# Patient Record
Sex: MALE | Race: WHITE | NOT HISPANIC OR LATINO | Employment: OTHER | URBAN - METROPOLITAN AREA
[De-identification: names, ages, dates, MRNs, and addresses within clinical notes are randomized per-mention and may not be internally consistent; named-entity substitution may affect disease eponyms.]

---

## 2017-02-01 ENCOUNTER — ONCOLOGY VISIT (OUTPATIENT)
Dept: ONCOLOGY | Facility: CLINIC | Age: 52
End: 2017-02-01
Attending: INTERNAL MEDICINE
Payer: COMMERCIAL

## 2017-02-01 ENCOUNTER — HOSPITAL ENCOUNTER (OUTPATIENT)
Dept: CT IMAGING | Facility: CLINIC | Age: 52
Discharge: HOME | End: 2017-02-01
Attending: INTERNAL MEDICINE | Admitting: INTERNAL MEDICINE
Payer: COMMERCIAL

## 2017-02-01 ENCOUNTER — HOSPITAL ENCOUNTER (OUTPATIENT)
Facility: CLINIC | Age: 52
Setting detail: SPECIMEN
Discharge: HOME | End: 2017-02-01
Attending: INTERNAL MEDICINE | Admitting: INTERNAL MEDICINE
Payer: COMMERCIAL

## 2017-02-01 DIAGNOSIS — C67.9 MALIGNANT NEOPLASM OF URINARY BLADDER, UNSPECIFIED SITE (H): ICD-10-CM

## 2017-02-01 DIAGNOSIS — E53.8 VITAMIN B12 DEFICIENCY (NON ANEMIC): ICD-10-CM

## 2017-02-01 LAB
ALBUMIN SERPL-MCNC: 3.7 G/DL (ref 3.4–5)
ALP SERPL-CCNC: 82 U/L (ref 40–150)
ALT SERPL W P-5'-P-CCNC: 39 U/L (ref 0–70)
ANION GAP SERPL CALCULATED.3IONS-SCNC: 9 MMOL/L (ref 3–14)
AST SERPL W P-5'-P-CCNC: 32 U/L (ref 0–45)
BASOPHILS # BLD AUTO: 0.1 10E9/L (ref 0–0.2)
BASOPHILS NFR BLD AUTO: 0.6 %
BILIRUB SERPL-MCNC: 1.1 MG/DL (ref 0.2–1.3)
BUN SERPL-MCNC: 11 MG/DL (ref 7–30)
CALCIUM SERPL-MCNC: 9.2 MG/DL (ref 8.5–10.1)
CHLORIDE SERPL-SCNC: 95 MMOL/L (ref 94–109)
CO2 SERPL-SCNC: 26 MMOL/L (ref 20–32)
CREAT SERPL-MCNC: 0.75 MG/DL (ref 0.66–1.25)
DIFFERENTIAL METHOD BLD: ABNORMAL
EOSINOPHIL # BLD AUTO: 0.2 10E9/L (ref 0–0.7)
EOSINOPHIL NFR BLD AUTO: 1.3 %
ERYTHROCYTE [DISTWIDTH] IN BLOOD BY AUTOMATED COUNT: 11.5 % (ref 10–15)
GFR SERPL CREATININE-BSD FRML MDRD: ABNORMAL ML/MIN/1.7M2
GLUCOSE SERPL-MCNC: 119 MG/DL (ref 70–99)
HCT VFR BLD AUTO: 38.1 % (ref 40–53)
HGB BLD-MCNC: 13.4 G/DL (ref 13.3–17.7)
IMM GRANULOCYTES # BLD: 0.1 10E9/L (ref 0–0.4)
IMM GRANULOCYTES NFR BLD: 0.7 %
LYMPHOCYTES # BLD AUTO: 3 10E9/L (ref 0.8–5.3)
LYMPHOCYTES NFR BLD AUTO: 26 %
MCH RBC QN AUTO: 34.7 PG (ref 26.5–33)
MCHC RBC AUTO-ENTMCNC: 35.2 G/DL (ref 31.5–36.5)
MCV RBC AUTO: 99 FL (ref 78–100)
MONOCYTES # BLD AUTO: 1.2 10E9/L (ref 0–1.3)
MONOCYTES NFR BLD AUTO: 10.3 %
NEUTROPHILS # BLD AUTO: 7.1 10E9/L (ref 1.6–8.3)
NEUTROPHILS NFR BLD AUTO: 61.1 %
NRBC # BLD AUTO: 0 10*3/UL
NRBC BLD AUTO-RTO: 0 /100
PLATELET # BLD AUTO: 184 10E9/L (ref 150–450)
POTASSIUM SERPL-SCNC: 3.7 MMOL/L (ref 3.4–5.3)
PROT SERPL-MCNC: 8.4 G/DL (ref 6.8–8.8)
RBC # BLD AUTO: 3.86 10E12/L (ref 4.4–5.9)
SODIUM SERPL-SCNC: 130 MMOL/L (ref 133–144)
VIT B12 SERPL-MCNC: 436 PG/ML (ref 193–986)
WBC # BLD AUTO: 11.7 10E9/L (ref 4–11)

## 2017-02-01 PROCEDURE — 25000128 H RX IP 250 OP 636: Performed by: RADIOLOGY

## 2017-02-01 PROCEDURE — 71260 CT THORAX DX C+: CPT

## 2017-02-01 PROCEDURE — 36415 COLL VENOUS BLD VENIPUNCTURE: CPT

## 2017-02-01 PROCEDURE — 74177 CT ABD & PELVIS W/CONTRAST: CPT

## 2017-02-01 PROCEDURE — 25500064 ZZH RX 255 OP 636: Performed by: RADIOLOGY

## 2017-02-01 RX ORDER — IOPAMIDOL 755 MG/ML
500 INJECTION, SOLUTION INTRAVASCULAR ONCE
Status: COMPLETED | OUTPATIENT
Start: 2017-02-01 | End: 2017-02-01

## 2017-02-01 RX ADMIN — SODIUM CHLORIDE 65 ML: 9 INJECTION, SOLUTION INTRAVENOUS at 11:13

## 2017-02-01 RX ADMIN — IOPAMIDOL 100 ML: 755 INJECTION, SOLUTION INTRAVENOUS at 11:13

## 2017-02-01 NOTE — PROGRESS NOTES
Patient tolerated procedure without incident.  Patient discharged in the care of himself.  Patient aware of next appointment.

## 2017-02-03 LAB — COPATH REPORT: NORMAL

## 2017-02-06 ENCOUNTER — ONCOLOGY VISIT (OUTPATIENT)
Dept: ONCOLOGY | Facility: CLINIC | Age: 52
End: 2017-02-06
Attending: INTERNAL MEDICINE
Payer: COMMERCIAL

## 2017-02-06 VITALS
RESPIRATION RATE: 16 BRPM | OXYGEN SATURATION: 97 % | TEMPERATURE: 97.2 F | BODY MASS INDEX: 30.98 KG/M2 | HEIGHT: 71 IN | HEART RATE: 52 BPM | WEIGHT: 221.25 LBS | DIASTOLIC BLOOD PRESSURE: 79 MMHG | SYSTOLIC BLOOD PRESSURE: 149 MMHG

## 2017-02-06 DIAGNOSIS — C67.9 MALIGNANT NEOPLASM OF URINARY BLADDER, UNSPECIFIED SITE (H): Primary | ICD-10-CM

## 2017-02-06 PROCEDURE — 99213 OFFICE O/P EST LOW 20 MIN: CPT | Performed by: INTERNAL MEDICINE

## 2017-02-06 PROCEDURE — 99211 OFF/OP EST MAY X REQ PHY/QHP: CPT

## 2017-02-06 ASSESSMENT — PAIN SCALES - GENERAL: PAINLEVEL: NO PAIN (0)

## 2017-02-06 NOTE — PROGRESS NOTES
"Casey Benitez is a 52 year old male who presents for:  Chief Complaint   Patient presents with     Oncology Clinic Visit     Bladder cancer - follow up        Initial Vitals:  There were no vitals taken for this visit. Estimated body mass index is 32.22 kg/(m^2) as calculated from the following:    Height as of 8/29/16: 1.797 m (5' 10.75\").    Weight as of 8/29/16: 104.055 kg (229 lb 6.4 oz).. There is no height or weight on file to calculate BSA. BP completed using cuff size: regular  Data Unavailable No LMP for male patient. Allergies and medications reviewed.     Medications: Medication refills not needed today.  Pharmacy name entered into Hurray!:    [facility] [address] [place], [place] - [address] [facility] [place], [place] - [address]    Comments: Follow up    8 minutes for nursing intake (face to face time)   Diana Stover CMA     DISCHARGE PLAN:  Next appointments: See patient instruction section  Departure Mode: Ambulatory  Accompanied by: self  0 minutes for nursing discharge (face to face time)   Diana Stover CMA              "

## 2017-02-06 NOTE — PROGRESS NOTES
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  He completed cycle 4 on 5/28/2013. He had some treatment delays due to thrombocytopenia.  He underwent surgery on 7/30 final pathology revealed a T3N0MX tumor. His CT showed a paratrachael node (left at 1.1 cm), concern for metastatic disease, therefore a PET was obtained which did not show this node being metabolically active.    SUBJECTIVE   Patient comes in for a follow up today. He has been feeling well. No new complaints today.    HEMATOLOGY ONCOLOGY HISTORY   Per Dr. Starks's previous note:  Mr. Benitez is a 49 year old male who presents with new diagnosis of bladder cancer. In April of 2012, Casey noticed some blood in his urine which was confirmed by a UA. He was treated for urinary tract infection and failed to followup until November of this year he saw Dr. Zee.  Due to persistent painless hematuria, cystoscopy was obtained which revealed a large bladder tumor. Final pathology revealed a grade 3 PT2 muscle invasive transitional cell carcinoma of the bladder. During his initial visit, we discussed starting treatment in the neoadjuvant fashion with cisplatin and gemcitabine and the rationale behind the drugs and treatment.  Casey had his mediport placed. We obtained metastatic workup with CXR and a bone scan (rib pain) which did not reveal any evidence of metastatic disease  Casey started Cisplatin and Gemcitabine on 1/15/2013.  He completed cycle 4 on 5/28/2013. He had some treatment delays due to thrombocytopenia.  He feels like his Energy has improved. He underwent surgery on 7/30 final pathology revealed a T3N0MX tumor. He is feeling well after the surgery   His CT showed a paratrachael node (left at 1.1  "cm), concern for metastatic disease, therefore a PET was obtained which did not show this node being metabolically active     REVIEW OF SYSTEMS   As above in the HPI, o/w a complete ROS was negative.    Past medical, social histories reviewed.    Meds- Reviewed.     PHYSICAL EXAM   /79 mmHg  Pulse 52  Temp(Src) 97.2  F (36.2  C) (Tympanic)  Resp 16  Ht 1.797 m (5' 10.75\")  Wt 100.358 kg (221 lb 4 oz)  BMI 31.08 kg/m2  SpO2 97%  GEN: NAD  HEENT: PERRL, EOMI, no icterus, injection or pallor. Oropharynx is clear.  NECK: no cervical or supraclavicular lymphadenopathy  LUNGS: clear bilaterally  CV: regular, no murmurs.  ABDOMEN: soft, non-tender, non-distended, normal bowel sounds, no hepatosplenomegaly.  EXT: warm, well perfused, no edema  NEURO: alert  SKIN: no rashes    DATA:  Recent Labs   Lab Test  02/01/17   1056  07/18/16   1317   05/06/13   0840   04/01/13   0935   NA  130*  130*   < >  137   < >  138   POTASSIUM  3.7  3.7   < >  3.8   < >  4.1   CHLORIDE  95  98   < >  96   < >  100   CO2  26  24   < >  27   < >  30   ANIONGAP  9  8   < >  13.6   < >  9   BUN  11  7   < >  8   < >  9   CR  0.75  0.78   < >  0.82   < >  0.83   GLC  119*  233*   < >  173*   < >  96   KIRBY  9.2  8.7   < >  9.1   < >  9.2   MAG   --    --    --   1.7   --   2.0    < > = values in this interval not displayed.     Recent Labs   Lab Test  02/01/17   1056 07/18/16   1317  02/09/16   1412   WBC  11.7*  8.5  10.8   HGB  13.4  14.1  14.6   PLT  184  174  190   MCV  99  99  98   NEUTROPHIL  61.1  53.3  50.6     Recent Labs   Lab Test  02/01/17   1056  07/18/16   1317  02/09/16   1412   BILITOTAL  1.1  0.7  0.4   ALKPHOS  82  73  79   ALT  39  62  59   AST  32  51*  32   ALBUMIN  3.7  3.8  4.2   2/1/17  Urine-catheterized, urostomy:   Negative for High-Grade Urothelial   Carcinoma       Results for orders placed or performed during the hospital encounter of 02/01/17   CT Chest/Abdomen/Pelvis w Contrast    Narrative    CT " CHEST/ABDOMEN/PELVIS WITH CONTRAST  2/1/2017 11:21 AM    HISTORY:  Follow up bladder cancer. Deficiency of other specified B  group vitamins.    TECHNIQUE: CT scan obtained of the chest, abdomen, and pelvis with  oral and IV contrast. 100 mL Isovue-370 injected. Radiation dose for  this scan was reduced using automated exposure control, adjustment of  the mA and/or kV according to patient size, or iterative  reconstruction technique.    COMPARISON:  CT chest, abdomen and pelvis 1/12/2016.    FINDINGS:  Chest: No enlarged lymph nodes identified in the chest. Stable tiny  right anterior mid chest pulmonary nodule that is 0.3 cm, series 3  image 31. A new left upper lobe midchest nodule likely represents  mucus impaction measuring 0.2 cm on image 31. No destructive-appearing  bony lesions identified. Some sclerosis along the inferior half of the  T11 vertebral body on coronal series 4 image 117 is stable and could  be degenerative in nature.    Abdomen/pelvis: No aggressive-appearing bony lesions.  Cystoprostatectomy and right lower quadrant ileal conduit again  identified. No acute bowel finding. Stable mild to moderate bilateral  hydronephrosis. Bilateral renal parenchyma enhances symmetrically.  Stable tiny hypodensity medial left kidney that is 0.4 cm, series 2  image 70. Liver demonstrates some mild nodularity without worrisome  focal lesion. Contracted gallbladder. Splenic calcifications. Pancreas  appears unremarkable. Vascular calcifications. No adenopathy  identified. A few minimally prominent upper abdominal lymph nodes are  again noted and appear stable.      Impression    IMPRESSION:  1. No evidence for convincing new disease.  2. Stable cystectomy and ileal diversion. Stable bilateral mild  hydronephrosis.  3. A new but small pulmonary nodule at the left upper lobe most likely  represents mucus impaction. Consider surveillance imaging.    CRYSTAL CHANDRA MD     ECOG PS: 1     ASSESSMENT   Jaime Benitez is a  pleasant 49-year-old male with diagnosis of transitional cell carcinoma of the bladder at least T2 NXMX s/p complete cystectomy with ileal diversion.  CT scan results 1/12/16 showed no evidence of recurrence.  Switched to yearly CT scan 1/2016.  He [facility] to [facility]   - Labs were reviewed with the patient- unremarkable, B12 level.  - He is on B12 supplementation.   - CT scan 2/1/2017 is without any evidence of disease    PLAN   RTC MD six months  Labs before next visit- CBC diff, CMP, urine cytology and B12    Constanza Mccarty MD  2/6/2017    CC Von Ceballos MD

## 2017-02-06 NOTE — MR AVS SNAPSHOT
After Visit Summary   2/6/2017    Casey Benitez    MRN: [id number]           Patient Information     Date Of Birth          1965        Visit Information        Provider Department      2/6/2017 11:30 AM Constanza Mccarty MD [facility]        Today's Diagnoses     Malignant neoplasm of urinary bladder, unspecified site (H)    -  1       Care Instructions        RTC MD six months    Labs before next visit- CBC diff, CMP, urine cytology and B12        Follow-ups after your visit        Your next 10 appointments already scheduled     Jul 31, 2017 10:00 AM   Return Visit with Constanza Mccarty MD   [facility] ([facility])    [facility]  [address]  [facility] [address] [phone]            Aug 07, 2017  1:30 PM   Return Visit with Constanza Mccarty MD   [facility] ([facility])    [facility]  [address]  [facility] [address] [phone]              Future tests that were ordered for you today     Open Future Orders        Priority Expected Expires Ordered    CBC with platelets differential Routine 8/6/2017 2/6/2018 2/6/2017    Comprehensive metabolic panel Routine 8/6/2017 2/6/2018 2/6/2017    Vitamin B12 Routine 8/6/2017 2/6/2018 2/6/2017    Cytology non gyn Routine 8/6/2017 2/6/2018 2/6/2017            Who to contact     If you have questions or need follow up information about today's clinic visit or your schedule please contact [facility] directly at [phone].  Normal or non-critical lab and imaging results will be communicated to you by MyChart, letter or phone within 4 business days after the clinic has received the results. If you do not hear from us within 7 days, please contact the clinic through MyChart or phone. If you have a critical or abnormal lab result, we will notify you by phone as  "soon as possible.  Submit refill requests through [employer] or call your pharmacy and they will forward the refill request to us. Please allow 3 business days for your refill to be completed.          Additional Information About Your Visit        [email or web address] Information     [employer] gives you secure access to your electronic health record. If you see a primary care provider, you can also send messages to your care team and make appointments. If you have questions, please call your primary care clinic.  If you do not have a primary care provider, please call [phone] and they will assist you.        Care EveryWhere ID     This is your Care EveryWhere ID. This could be used by other organizations to access your [place] medical records  [id number]        Your Vitals Were     Pulse Temperature Respirations Height BMI (Body Mass Index) Pulse Oximetry    52 97.2  F (36.2  C) (Tympanic) 16 1.797 m (5' 10.75\") 31.08 kg/m2 97%       Blood Pressure from Last 3 Encounters:   02/06/17 149/79   08/29/16 164/81   02/09/16 131/83    Weight from Last 3 Encounters:   02/06/17 100.358 kg (221 lb 4 oz)   08/29/16 104.055 kg (229 lb 6.4 oz)   02/09/16 97.387 kg (214 lb 11.2 oz)               Primary Care Provider Office Phone # Fax #    Ramona Ann Aaseby-Aguilera, PA-C [phone]       [facility] [address] [address] [facility] [address]        Thank you!     Thank you for choosing [facility]  for your care. Our goal is always to provide you with excellent care. Hearing back from our patients is one way we can continue to improve our services. Please take a few minutes to complete the written survey that you may receive in the mail after your visit with us. Thank you!             Your Updated Medication List - Protect others around you: Learn how to safely use, store and throw away your medicines at www.disposemymeds.org.          This list is accurate as of: 2/6/17 12:00 PM.  Always " use your most recent med list.                   Brand Name Dispense Instructions for use    atenolol 100 MG tablet    TENORMIN    90 tablet    Take 1 tablet (100 mg) by mouth daily       budesonide-formoterol 160-4.5 MCG/ACT Inhaler    SYMBICORT    3 Inhaler    Inhale 2 puffs into the lungs 2 times daily as needed       cyabnocobalamin 2500 MCG sublingual tablet    VITAMIN B-12    30 tablet    Place 2,500 mcg under the tongue daily       hydrochlorothiazide 12.5 MG Tabs tablet     90 tablet    Take 1 tablet (12.5 mg) by mouth daily       lisinopril 20 MG tablet    PRINIVIL/ZESTRIL    135 tablet    Take 1.5 tablets (30 mg) by mouth daily       LORazepam 1 MG tablet    ATIVAN    30 tablet    Take 0.5 tablets (0.5 mg) by mouth nightly as needed for anxiety

## 2017-02-10 DIAGNOSIS — I10 HYPERTENSION GOAL BP (BLOOD PRESSURE) < 140/90: Primary | ICD-10-CM

## 2017-02-10 RX ORDER — LISINOPRIL 20 MG/1
30 TABLET ORAL DAILY
Qty: 45 TABLET | Refills: 0 | Status: SHIPPED | OUTPATIENT
Start: 2017-02-10 | End: 2017-03-20

## 2017-02-10 RX ORDER — HYDROCHLOROTHIAZIDE 12.5 MG/1
12.5 TABLET ORAL DAILY
Qty: 30 TABLET | Refills: 0 | Status: SHIPPED | OUTPATIENT
Start: 2017-02-10 | End: 2017-03-23

## 2017-02-10 NOTE — TELEPHONE ENCOUNTER
Prescription approved per [facility] Refill Protocol.  For 30 day RX-  LM pt needs yearly OV with PCP     Maria Esther Casillas RN

## 2017-02-10 NOTE — TELEPHONE ENCOUNTER
Pending Prescriptions:                       Disp   Refills    lisinopril (PRINIVIL/ZESTRIL) 20 MG [id number] ta*2            Sig: Take 1.5 tablets (30 mg) by mouth daily    hydrochlorothiazide 12.5 MG TABS tablet   90 tab*2            Sig: Take 1 tablet (12.5 mg) by mouth daily          Last Written Prescription Date: 05/23/2016  Last Fill Quantity: 135/90, # refills: 2/2  Last Office Visit with G, [facility] or [facility] prescribing provider: 02/09/2016       POTASSIUM   Date Value Ref Range Status   02/01/2017 3.7 3.4 - 5.3 mmol/L Final     CREATININE   Date Value Ref Range Status   02/01/2017 0.75 0.66 - 1.25 mg/dL Final     BP Readings from Last 3 Encounters:   02/06/17 149/79   08/29/16 164/81   02/09/16 131/83     Ludwin OSEGUERAT

## 2017-03-20 DIAGNOSIS — I10 HYPERTENSION GOAL BP (BLOOD PRESSURE) < 140/90: ICD-10-CM

## 2017-03-20 NOTE — TELEPHONE ENCOUNTER
Pending Prescriptions:                       Disp   Refills    lisinopril (PRINIVIL/ZESTRIL) 20 MG [id number] tab*0            Sig: Take 1.5 tablets (30 mg) by mouth daily          Last Written Prescription Date: 02/10/2017  Last Fill Quantity: 45, # refills: 0  Last Office Visit with [facility], [facility] or [facility] prescribing provider: 02/09/2016       Potassium   Date Value Ref Range Status   02/01/2017 3.7 3.4 - 5.3 mmol/L Final     Creatinine   Date Value Ref Range Status   02/01/2017 0.75 0.66 - 1.25 mg/dL Final     BP Readings from Last 3 Encounters:   02/06/17 149/79   08/29/16 164/81   02/09/16 131/83     Ludwin OSEGUERAT

## 2017-03-21 RX ORDER — LISINOPRIL 20 MG/1
30 TABLET ORAL DAILY
Qty: 20 TABLET | Refills: 0 | Status: SHIPPED | OUTPATIENT
Start: 2017-03-21 | End: 2017-03-31

## 2017-03-21 NOTE — TELEPHONE ENCOUNTER
Routing refill request to provider for review/approval because:  Katt given x1 and patient did not follow up, please advise  Patient needs to be seen because it has been more than 1 year since last office visit.  Ricardo Lopez RN, BSN

## 2017-03-23 ENCOUNTER — MYC MEDICAL ADVICE (OUTPATIENT)
Dept: FAMILY MEDICINE | Facility: CLINIC | Age: 52
End: 2017-03-23

## 2017-03-23 DIAGNOSIS — I10 HYPERTENSION GOAL BP (BLOOD PRESSURE) < 140/90: ICD-10-CM

## 2017-03-23 RX ORDER — HYDROCHLOROTHIAZIDE 12.5 MG/1
12.5 TABLET ORAL DAILY
Qty: 30 TABLET | Refills: 0 | Status: SHIPPED | OUTPATIENT
Start: 2017-03-23 | End: 2017-03-31

## 2017-03-23 NOTE — TELEPHONE ENCOUNTER
Prescription approved per [facility] Refill Protocol.  Maria Esther Casillas RN

## 2017-03-23 NOTE — TELEPHONE ENCOUNTER
Pending Prescriptions:                       Disp   Refills    hydrochlorothiazide 12.5 MG TABS tablet   30 tab*0            Sig: Take 1 tablet (12.5 mg) by mouth daily    Hudrochlorothiazide  Last Written Prescription Date: 02/10/17  Last Fill Quantity: 30, # refills: 0  Last Office Visit with [facility], [facility] or [facility] prescribing provider: 02/09/2016  Next 5 appointments (look out 90 days)     Mar 31, 2017 11:30 AM CDT   Char Brunson with Ramona Ann Aaseby-Aguilera, PA-C   [facility]    [address] [facility] [address] [phone]                   Potassium   Date Value Ref Range Status   02/01/2017 3.7 3.4 - 5.3 mmol/L Final     Creatinine   Date Value Ref Range Status   02/01/2017 0.75 0.66 - 1.25 mg/dL Final     BP Readings from Last 3 Encounters:   02/06/17 149/79   08/29/16 164/81   02/09/16 131/83     Livier Gutierrez

## 2017-03-31 ENCOUNTER — OFFICE VISIT (OUTPATIENT)
Dept: FAMILY MEDICINE | Facility: CLINIC | Age: 52
End: 2017-03-31
Payer: COMMERCIAL

## 2017-03-31 VITALS
TEMPERATURE: 97.7 F | HEIGHT: 71 IN | BODY MASS INDEX: 30.21 KG/M2 | SYSTOLIC BLOOD PRESSURE: 122 MMHG | WEIGHT: 215.8 LBS | DIASTOLIC BLOOD PRESSURE: 72 MMHG | HEART RATE: 58 BPM | OXYGEN SATURATION: 97 %

## 2017-03-31 DIAGNOSIS — I10 HYPERTENSION GOAL BP (BLOOD PRESSURE) < 140/90: ICD-10-CM

## 2017-03-31 DIAGNOSIS — Z13.0 SCREENING FOR DISORDER OF BLOOD AND BLOOD-FORMING ORGANS: ICD-10-CM

## 2017-03-31 DIAGNOSIS — Z13.1 SCREENING FOR DIABETES MELLITUS: ICD-10-CM

## 2017-03-31 DIAGNOSIS — F41.9 ANXIETY: ICD-10-CM

## 2017-03-31 DIAGNOSIS — Z13.29 SCREENING FOR THYROID DISORDER: ICD-10-CM

## 2017-03-31 DIAGNOSIS — I10 ESSENTIAL HYPERTENSION WITH GOAL BLOOD PRESSURE LESS THAN 140/90: ICD-10-CM

## 2017-03-31 DIAGNOSIS — Z13.220 LIPID SCREENING: ICD-10-CM

## 2017-03-31 DIAGNOSIS — G25.81 RESTLESS LEGS SYNDROME (RLS): Primary | ICD-10-CM

## 2017-03-31 DIAGNOSIS — J41.0 SIMPLE CHRONIC BRONCHITIS (H): ICD-10-CM

## 2017-03-31 PROCEDURE — 99396 PREV VISIT EST AGE 40-64: CPT | Performed by: PHYSICIAN ASSISTANT

## 2017-03-31 RX ORDER — LISINOPRIL 20 MG/1
30 TABLET ORAL DAILY
Qty: 135 TABLET | Refills: 3 | Status: SHIPPED | OUTPATIENT
Start: 2017-03-31 | End: 2018-04-17

## 2017-03-31 RX ORDER — ATENOLOL 100 MG/1
100 TABLET ORAL DAILY
Qty: 90 TABLET | Refills: 1 | Status: SHIPPED | OUTPATIENT
Start: 2017-03-31 | End: 2017-11-22

## 2017-03-31 RX ORDER — HYDROCHLOROTHIAZIDE 12.5 MG/1
12.5 TABLET ORAL DAILY
Qty: 90 TABLET | Refills: 3 | Status: SHIPPED | OUTPATIENT
Start: 2017-03-31 | End: 2018-04-17

## 2017-03-31 RX ORDER — BUDESONIDE AND FORMOTEROL FUMARATE DIHYDRATE 160; 4.5 UG/1; UG/1
2 AEROSOL RESPIRATORY (INHALATION) 2 TIMES DAILY PRN
Qty: 3 INHALER | Refills: 2 | Status: SHIPPED | OUTPATIENT
Start: 2017-03-31 | End: 2019-06-13

## 2017-03-31 RX ORDER — LORAZEPAM 1 MG/1
0.5 TABLET ORAL
Qty: 30 TABLET | Refills: 0 | Status: SHIPPED | OUTPATIENT
Start: 2017-03-31 | End: 2020-10-14

## 2017-03-31 NOTE — MR AVS SNAPSHOT
After Visit Summary   3/31/2017    Casey Benitez    MRN: [id number]           Patient Information     Date Of Birth          1965        Visit Information        Provider Department      3/31/2017 11:30 AM Aaseby-Aguilera, Ramona Ann, PA-C [facility]        Today's Diagnoses     Restless legs syndrome (RLS)    -  1    Hypertension goal BP (blood pressure) < 140/90        Anxiety        Screening for diabetes mellitus        Screening for thyroid disorder        Lipid screening        Screening for disorder of blood and blood-forming organs        Essential hypertension with goal blood pressure less than 140/90        Simple chronic bronchitis (H)          Care Instructions      Preventive Health Recommendations  Male Ages 50 - 64    Yearly exam:             See your health care provider every year in order to  o   Review health changes.   o   Discuss preventive care.    o   Review your medicines if your doctor has prescribed any.     Have a cholesterol test every 5 years, or more frequently if you are at risk for high cholesterol/heart disease.     Have a diabetes test (fasting glucose) every three years. If you are at risk for diabetes, you should have this test more often.     Have a colonoscopy at age 50, or have a yearly FIT test (stool test). These exams will check for colon cancer.      Talk with your health care provider about whether or not a prostate cancer screening test (PSA) is right for you.    You should be tested each year for STDs (sexually transmitted diseases), if you re at risk.     Shots: Get a flu shot each year. Get a tetanus shot every 10 years.     Nutrition:    Eat at least 5 servings of fruits and vegetables daily.     Eat whole-grain bread, whole-wheat pasta and brown rice instead of white grains and rice.     Talk to your provider about Calcium and Vitamin D.     Lifestyle    Exercise for at least 150 minutes a week (30 minutes a day, 5 days a week). This  will help you control your weight and prevent disease.     Limit alcohol to one drink per day.     No smoking.     Wear sunscreen to prevent skin cancer.     See your dentist every six months for an exam and cleaning.     See your eye doctor every 1 to 2 years.          Follow-ups after your visit        Your next 10 appointments already scheduled     Jul 31, 2017 10:00 AM CDT   Return Visit with RH ONCOLOGY NURSE   [facility] ([facility])    [facility]  [address]  [facility] [address]   [phone]            Aug 07, 2017  1:30 PM CDT   Return Visit with Constanza Mccarty MD   [facility] ([facility])    [facility]  [address]  [facility] [address]   [phone]              Future tests that were ordered for you today     Open Future Orders        Priority Expected Expires Ordered    CBC with platelets Routine  8/31/2017 3/31/2017    TSH with free T4 reflex Routine  8/31/2017 3/31/2017    Comprehensive metabolic panel Routine  8/31/2017 3/31/2017    Lipid panel reflex to direct LDL Routine  8/31/2017 3/31/2017    Ferritin Routine  8/31/2017 3/31/2017            Who to contact     If you have questions or need follow up information about today's clinic visit or your schedule please contact [facility] directly at [phone].  Normal or non-critical lab and imaging results will be communicated to you by MyChart, letter or phone within 4 business days after the clinic has received the results. If you do not hear from us within 7 days, please contact the clinic through MyChart or phone. If you have a critical or abnormal lab result, we will notify you by phone as soon as possible.  Submit refill requests through [employer] or call your pharmacy and they will forward the refill request to us. Please allow 3 business days for your refill to be completed.  "         Additional Information About Your Visit        MyChart Information     [employer] gives you secure access to your electronic health record. If you see a primary care provider, you can also send messages to your care team and make appointments. If you have questions, please call your primary care clinic.  If you do not have a primary care provider, please call [phone] and they will assist you.        Care EveryWhere ID     This is your Care EveryWhere ID. This could be used by other organizations to access your [place] medical records  [id number]        Your Vitals Were     Pulse Temperature Height Pulse Oximetry BMI (Body Mass Index)       58 97.7  F (36.5  C) (Oral) 5' 10.75\" (1.797 m) 97% 30.31 kg/m2        Blood Pressure from Last 3 Encounters:   03/31/17 122/72   02/06/17 149/79   08/29/16 164/81    Weight from Last 3 Encounters:   03/31/17 215 lb 12.8 oz (97.9 kg)   02/06/17 221 lb 4 oz (100.4 kg)   08/29/16 229 lb 6.4 oz (104.1 kg)                 Where to get your medicines      These medications were sent to [place] [address] [place] [facility] [address] [facility]  [address] [facility] [address]    Hours:  Tech issues with their phone system Phone:  [phone]     atenolol 100 MG tablet    budesonide-formoterol 160-4.5 MCG/ACT Inhaler    hydrochlorothiazide 12.5 MG Tabs tablet    lisinopril 20 MG tablet         Some of these will need a paper prescription and others can be bought over the counter.  Ask your nurse if you have questions.     Bring a paper prescription for each of these medications     LORazepam 1 MG tablet          Primary Care Provider Office Phone # Fax #    Ramona Ann Aaseby-Aguilera, PA-C [phone]       [facility] [address]  [place] [address]        Thank you!     Thank you for choosing [facility]  for your care. Our goal is always to provide you with excellent care. Hearing back from our patients is one " way we can continue to improve our services. Please take a few minutes to complete the written survey that you may receive in the mail after your visit with us. Thank you!             Your Updated Medication List - Protect others around you: Learn how to safely use, store and throw away your medicines at www.disposemymeds.org.          This list is accurate as of: 3/31/17 12:16 PM.  Always use your most recent med list.                   Brand Name Dispense Instructions for use    atenolol 100 MG tablet    TENORMIN    90 tablet    Take 1 tablet (100 mg) by mouth daily       budesonide-formoterol 160-4.5 MCG/ACT Inhaler    SYMBICORT    3 Inhaler    Inhale 2 puffs into the lungs 2 times daily as needed       cyabnocobalamin 2500 MCG sublingual tablet    VITAMIN B-12    30 tablet    Place 2,500 mcg under the tongue daily       hydrochlorothiazide 12.5 MG Tabs tablet     90 tablet    Take 1 tablet (12.5 mg) by mouth daily       lisinopril 20 MG tablet    PRINIVIL/ZESTRIL    135 tablet    Take 1.5 tablets (30 mg) by mouth daily       LORazepam 1 MG tablet    ATIVAN    30 tablet    Take 0.5 tablets (0.5 mg) by mouth nightly as needed for anxiety

## 2017-03-31 NOTE — NURSING NOTE
"Chief Complaint   Patient presents with     Physical       Initial /72 (BP Location: Right arm, Patient Position: Chair, Cuff Size: Adult Large)  Pulse 58  Temp 97.7  F (36.5  C) (Oral)  Ht 5' 10.75\" (1.797 m)  Wt 215 lb 12.8 oz (97.9 kg)  SpO2 97%  BMI 30.31 kg/m2 Estimated body mass index is 30.31 kg/(m^2) as calculated from the following:    Height as of this encounter: 5' 10.75\" (1.797 m).    Weight as of this encounter: 215 lb 12.8 oz (97.9 kg).  Medication Reconciliation: complete Quyen Norman CMA      "

## 2017-03-31 NOTE — PROGRESS NOTES
SUBJECTIVE:     CC: Casey Benitez is an 52 year old male who presents for preventative health visit.     Healthy Habits:    Do you get at least three servings of calcium containing foods daily (dairy, green leafy vegetables, etc.)? yes    Amount of exercise or daily activities, outside of work: 2 day(s) per week    Problems taking medications regularly No    Medication side effects: No    Have you had an eye exam in the past two years? yes    Do you see a dentist twice per year? yes    Do you have sleep apnea, excessive snoring or daytime drowsiness?no            Today's PHQ-2 Score:   PHQ-2 ( 1999 Pf[employer]) 2/6/2017 8/29/2016   Q1: Little interest or pleasure in doing things 0 0   Q2: Feeling down, depressed or hopeless 0 0   PHQ-2 Score 0 0       Abuse: Current or Past(Physical, Sexual or Emotional)- No  Do you feel safe in your environment - Yes    Social History   Substance Use Topics     Smoking status: Former Smoker     Packs/day: 0.50     Years: 20.00     Types: Cigarettes     Start date: 1/30/1992     Quit date: 12/14/2012     Smokeless tobacco: Never Used     Alcohol use Yes      Comment: social     social    Last PSA:   PSA   Date Value Ref Range Status   07/27/2009 0.24 0 - 4 ug/L Final       Recent Labs   Lab Test  05/13/15   0959  11/12/12   1300   CHOL  173  234*   HDL  50  41   LDL  102  159*   TRIG  104  172*   CHOLHDLRATIO  3.5  5.8*       Reviewed orders with patient. Reviewed health maintenance and updated orders accordingly - Yes    Reviewed and updated as needed this visit by clinical staff  Tobacco  Allergies  Meds  Med Hx  Surg Hx  Fam Hx  Soc Hx        Reviewed and updated as needed this visit by Provider            ROS:  C: NEGATIVE for fever, chills, change in weight  I: NEGATIVE for worrisome rashes, moles or lesions  E: NEGATIVE for vision changes or irritation  ENT: NEGATIVE for ear, mouth and throat problems  R: NEGATIVE for significant cough or SOB  CV: NEGATIVE for chest pain,  "palpitations or peripheral edema  GI: NEGATIVE for nausea, abdominal pain, heartburn, or change in bowel habits   male: negative for dysuria, hematuria, decreased urinary stream, erectile dysfunction, urethral discharge  M: NEGATIVE for significant arthralgias or myalgia  N: NEGATIVE for weakness, dizziness or paresthesias  P: NEGATIVE for changes in mood or affect    BP Readings from Last 3 Encounters:   03/31/17 122/72   02/06/17 149/79   08/29/16 164/81    Wt Readings from Last 3 Encounters:   03/31/17 215 lb 12.8 oz (97.9 kg)   02/06/17 221 lb 4 oz (100.4 kg)   08/29/16 229 lb 6.4 oz (104.1 kg)                  OBJECTIVE:     /72 (BP Location: Right arm, Patient Position: Chair, Cuff Size: Adult Large)  Pulse 58  Temp 97.7  F (36.5  C) (Oral)  Ht 5' 10.75\" (1.797 m)  Wt 215 lb 12.8 oz (97.9 kg)  SpO2 97%  BMI 30.31 kg/m2  EXAM:  GENERAL: healthy, alert and no distress  EYES: Eyes grossly normal to inspection, PERRL and conjunctivae and sclerae normal  HENT: ear canals and TM's normal, nose and mouth without ulcers or lesions  NECK: no adenopathy, no asymmetry, masses, or scars and thyroid normal to palpation  RESP: lungs clear to auscultation - no rales, rhonchi or wheezes  CV: regular rate and rhythm, normal S1 S2, no S3 or S4, no murmur, click or rub, no peripheral edema and peripheral pulses strong  ABDOMEN: soft, nontender, no hepatosplenomegaly, no masses and bowel sounds normal  MS: no gross musculoskeletal defects noted, no edema  SKIN: no suspicious lesions or rashes  NEURO: Normal strength and tone, mentation intact and speech normal  PSYCH: mentation appears normal, affect normal/bright    ASSESSMENT/PLAN:     1. Hypertension goal BP (blood pressure) < 140/90    - hydrochlorothiazide 12.5 MG TABS tablet; Take 1 tablet (12.5 mg) by mouth daily  Dispense: 90 tablet; Refill: 3  - lisinopril (PRINIVIL/ZESTRIL) 20 MG tablet; Take 1.5 tablets (30 mg) by mouth daily  Dispense: 135 tablet; " "Refill: 3    2. Anxiety    - LORazepam (ATIVAN) 1 MG tablet; Take 0.5 tablets (0.5 mg) by mouth nightly as needed for anxiety  Dispense: 30 tablet; Refill: 0    3. Screening for diabetes mellitus    - Comprehensive metabolic panel; Future    4. Screening for thyroid disorder    - TSH with free T4 reflex; Future    5. Lipid screening    - Lipid panel reflex to direct LDL; Future    6. Screening for disorder of blood and blood-forming organs    - CBC with platelets; Future    7. Essential hypertension with goal blood pressure less than 140/90    - atenolol (TENORMIN) 100 MG tablet; Take 1 tablet (100 mg) by mouth daily  Dispense: 90 tablet; Refill: 1    8. Simple chronic bronchitis (H)    - budesonide-formoterol (SYMBICORT) 160-4.5 MCG/ACT Inhaler; Inhale 2 puffs into the lungs 2 times daily as needed  Dispense: 3 Inhaler; Refill: 2    9. Restless legs syndrome (RLS)    - Ferritin; Future    COUNSELING:  Reviewed preventive health counseling, as reflected in patient instructions       Consider AAA screening for ages 65-75 and smoking history       Regular exercise       Healthy diet/nutrition       Vision screening       Hearing screening       Aspirin Prophylaxsis         reports that he quit smoking about 4 years ago. His smoking use included Cigarettes. He started smoking about 25 years ago. He has a 10.00 pack-year smoking history. He has never used smokeless tobacco.    Estimated body mass index is 30.31 kg/(m^2) as calculated from the following:    Height as of this encounter: 5' 10.75\" (1.797 m).    Weight as of this encounter: 215 lb 12.8 oz (97.9 kg).       Counseling Resources:  ATP IV Guidelines  Pooled Cohorts Equation Calculator  FRAX Risk Assessment  ICSI Preventive Guidelines  Dietary Guidelines for Americans, 2010  AquarisPLUS Int's MyPlate  ASA Prophylaxis  Lung CA Screening    Ramona Ann Aaseby-Aguilera, PA-C  [facility]    Patient Instructions     Preventive Health Recommendations  Male Ages 50 " - 64    Yearly exam:             See your health care provider every year in order to  o   Review health changes.   o   Discuss preventive care.    o   Review your medicines if your doctor has prescribed any.     Have a cholesterol test every 5 years, or more frequently if you are at risk for high cholesterol/heart disease.     Have a diabetes test (fasting glucose) every three years. If you are at risk for diabetes, you should have this test more often.     Have a colonoscopy at age 50, or have a yearly FIT test (stool test). These exams will check for colon cancer.      Talk with your health care provider about whether or not a prostate cancer screening test (PSA) is right for you.    You should be tested each year for STDs (sexually transmitted diseases), if you re at risk.     Shots: Get a flu shot each year. Get a tetanus shot every 10 years.     Nutrition:    Eat at least 5 servings of fruits and vegetables daily.     Eat whole-grain bread, whole-wheat pasta and brown rice instead of white grains and rice.     Talk to your provider about Calcium and Vitamin D.     Lifestyle    Exercise for at least 150 minutes a week (30 minutes a day, 5 days a week). This will help you control your weight and prevent disease.     Limit alcohol to one drink per day.     No smoking.     Wear sunscreen to prevent skin cancer.     See your dentist every six months for an exam and cleaning.     See your eye doctor every 1 to 2 years.

## 2017-05-26 ENCOUNTER — OFFICE VISIT (OUTPATIENT)
Dept: URGENT CARE | Facility: URGENT CARE | Age: 52
End: 2017-05-26
Payer: COMMERCIAL

## 2017-05-26 VITALS
TEMPERATURE: 98.3 F | DIASTOLIC BLOOD PRESSURE: 78 MMHG | WEIGHT: 215 LBS | SYSTOLIC BLOOD PRESSURE: 132 MMHG | BODY MASS INDEX: 30.2 KG/M2 | HEART RATE: 67 BPM | RESPIRATION RATE: 20 BRPM | OXYGEN SATURATION: 98 %

## 2017-05-26 DIAGNOSIS — R30.0 DYSURIA: Primary | ICD-10-CM

## 2017-05-26 LAB
ALBUMIN UR-MCNC: NEGATIVE MG/DL
APPEARANCE UR: CLEAR
BACTERIA #/AREA URNS HPF: ABNORMAL /HPF
BILIRUB UR QL STRIP: NEGATIVE
COLOR UR AUTO: YELLOW
GLUCOSE UR STRIP-MCNC: NEGATIVE MG/DL
HGB UR QL STRIP: ABNORMAL
KETONES UR STRIP-MCNC: NEGATIVE MG/DL
LEUKOCYTE ESTERASE UR QL STRIP: ABNORMAL
NITRATE UR QL: POSITIVE
PH UR STRIP: 6.5 PH (ref 5–7)
RBC #/AREA URNS AUTO: ABNORMAL /HPF (ref 0–2)
SP GR UR STRIP: 1.01 (ref 1–1.03)
URN SPEC COLLECT METH UR: ABNORMAL
UROBILINOGEN UR STRIP-ACNC: 0.2 EU/DL (ref 0.2–1)
WBC #/AREA URNS AUTO: ABNORMAL /HPF (ref 0–2)

## 2017-05-26 PROCEDURE — 81001 URINALYSIS AUTO W/SCOPE: CPT | Performed by: FAMILY MEDICINE

## 2017-05-26 PROCEDURE — 96372 THER/PROPH/DIAG INJ SC/IM: CPT | Performed by: FAMILY MEDICINE

## 2017-05-26 PROCEDURE — 99213 OFFICE O/P EST LOW 20 MIN: CPT | Mod: 25 | Performed by: FAMILY MEDICINE

## 2017-05-26 RX ORDER — CEFTRIAXONE 1 G/1
1000 INJECTION, POWDER, FOR SOLUTION INTRAMUSCULAR; INTRAVENOUS ONCE
Qty: 10 ML | Refills: 0 | OUTPATIENT
Start: 2017-05-26 | End: 2017-05-26

## 2017-05-26 RX ORDER — CEFDINIR 300 MG/1
300 CAPSULE ORAL 2 TIMES DAILY
Qty: 20 CAPSULE | Refills: 0 | Status: SHIPPED | OUTPATIENT
Start: 2017-05-26 | End: 2017-08-07

## 2017-05-26 NOTE — MR AVS SNAPSHOT
After Visit Summary   5/26/2017    Casey Benitez    MRN: [id number]           Patient Information     Date Of Birth          1965        Visit Information        Provider Department      5/26/2017 7:50 PM Gaurav Zamarripa MD [facility]        Today's Diagnoses     Dysuria    -  1      Care Instructions    1. Oral antibiotics.   2. Increased hydration    3. IM Rocephin    4. Follow with PCP MsJaime Enma in the coming week          Follow-ups after your visit        Your next 10 appointments already scheduled     Jul 31, 2017 10:00 AM CDT   Return Visit with RH ONCOLOGY NURSE   [facility] ([facility])    [facility]  [address]  [facility] [address]   [phone]            Aug 07, 2017  1:30 PM CDT   Return Visit with Constanza Mccarty MD   [facility] ([facility])    [facility]  [address]  [facility] [address]   [phone]              Who to contact     If you have questions or need follow up information about today's clinic visit or your schedule please contact [facility] directly at [phone].  Normal or non-critical lab and imaging results will be communicated to you by MyChart, letter or phone within 4 business days after the clinic has received the results. If you do not hear from us within 7 days, please contact the clinic through [employer] or phone. If you have a critical or abnormal lab result, we will notify you by phone as soon as possible.  Submit refill requests through [employer] or call your pharmacy and they will forward the refill request to us. Please allow 3 business days for your refill to be completed.          Additional Information About Your Visit        [employer] Information     [employer] gives you secure access to your electronic health record. If you see a primary care provider, you can  also send messages to your care team and make appointments. If you have questions, please call your primary care clinic.  If you do not have a primary care provider, please call [phone] and they will assist you.        Care EveryWhere ID     This is your Care EveryWhere ID. This could be used by other organizations to access your [facility] medical records  [id number]        Your Vitals Were     Pulse Temperature Respirations Pulse Oximetry BMI (Body Mass Index)       67 98.3  F (36.8  C) (Oral) 20 98% 30.2 kg/m2        Blood Pressure from Last 3 Encounters:   05/26/17 132/78   03/31/17 122/72   02/06/17 149/79    Weight from Last 3 Encounters:   05/26/17 215 lb (97.5 kg)   03/31/17 215 lb 12.8 oz (97.9 kg)   02/06/17 221 lb 4 oz (100.4 kg)              We Performed the Following     *UA reflex to Microscopic and Culture ([place] [facility]] (except [place] and [place])     CEFTRIAXONE NA INJ /250MG     INJECTION INTRAMUSCULAR OR SUB-Q     Urine Microscopic          Today's Medication Changes          These changes are accurate as of: 5/26/17  8:31 PM.  If you have any questions, ask your nurse or doctor.               Start taking these medicines.        Dose/Directions    * cefdinir 300 MG capsule   Commonly known as:  OMNICEF   Used for:  Dysuria   Started by:  Gaurav Zamarripa MD        Dose:  300 mg   Take 1 capsule (300 mg) by mouth 2 times daily   Quantity:  20 capsule   Refills:  0       * cefdinir 300 MG capsule   Commonly known as:  OMNICEF   Used for:  Dysuria   Started by:  Gaurav Zamarripa MD        Dose:  300 mg   Take 1 capsule (300 mg) by mouth 2 times daily   Quantity:  20 capsule   Refills:  0       cefTRIAXone 1 GM vial   Commonly known as:  ROCEPHIN   Used for:  Dysuria   Started by:  Gaurav Zamarripa MD        Dose:  1000 mg   Inject 1 g (1,000 mg) into the muscle once for 1 dose   Quantity:  10 mL   Refills:  0       * Notice:  This list has 2 medication(s) that are the same as  other medications prescribed for you. Read the directions carefully, and ask your doctor or other care provider to review them with you.         Where to get your medicines      These medications were sent to [facility] [address] [place] [facility] [id number] [facility] [address]    Hours:  Tech issues with their phone system Phone:  [phone]     cefdinir 300 MG capsule    cefdinir 300 MG capsule         Some of these will need a paper prescription and others can be bought over the counter.  Ask your nurse if you have questions.     You don't need a prescription for these medications     cefTRIAXone 1 GM vial                Primary Care Provider Office Phone # Fax #    Ramona Ann Aaseby-Aguilera, PA-C [phone]       [facility] [address] [facility] [facility] [address]        Thank you!     Thank you for choosing [facility]  for your care. Our goal is always to provide you with excellent care. Hearing back from our patients is one way we can continue to improve our services. Please take a few minutes to complete the written survey that you may receive in the mail after your visit with us. Thank you!             Your Updated Medication List - Protect others around you: Learn how to safely use, store and throw away your medicines at www.disposemymeds.org.          This list is accurate as of: 5/26/17  8:31 PM.  Always use your most recent med list.                   Brand Name Dispense Instructions for use    atenolol 100 MG tablet    TENORMIN    90 tablet    Take 1 tablet (100 mg) by mouth daily       budesonide-formoterol 160-4.5 MCG/ACT Inhaler    SYMBICORT    3 Inhaler    Inhale 2 puffs into the lungs 2 times daily as needed       * cefdinir 300 MG capsule    OMNICEF    20 capsule    Take 1 capsule (300 mg) by mouth 2 times daily       * cefdinir 300 MG capsule    OMNICEF    20 capsule    Take 1 capsule (300 mg) by mouth 2 times daily        cefTRIAXone 1 GM vial    ROCEPHIN    10 mL    Inject 1 g (1,000 mg) into the muscle once for 1 dose       cyabnocobalamin 2500 MCG sublingual tablet    VITAMIN B-12    30 tablet    Place 2,500 mcg under the tongue daily       hydrochlorothiazide 12.5 MG Tabs tablet     90 tablet    Take 1 tablet (12.5 mg) by mouth daily       lisinopril 20 MG tablet    PRINIVIL/ZESTRIL    135 tablet    Take 1.5 tablets (30 mg) by mouth daily       LORazepam 1 MG tablet    ATIVAN    30 tablet    Take 0.5 tablets (0.5 mg) by mouth nightly as needed for anxiety       * Notice:  This list has 2 medication(s) that are the same as other medications prescribed for you. Read the directions carefully, and ask your doctor or other care provider to review them with you.

## 2017-05-27 NOTE — NURSING NOTE
"Chief Complaint   Patient presents with     Urgent Care       Initial /78  Pulse 67  Temp 98.3  F (36.8  C) (Oral)  Resp 20  Wt 215 lb (97.5 kg)  SpO2 98%  BMI 30.2 kg/m2 Estimated body mass index is 30.2 kg/(m^2) as calculated from the following:    Height as of 3/31/17: 5' 10.75\" (1.797 m).    Weight as of this encounter: 215 lb (97.5 kg).  Medication Reconciliation: complete       Luz Maria De La Fuente CMA      "

## 2017-05-27 NOTE — PROGRESS NOTES
SUBJECTIVE:                                                    Casey Benitez is a 52 year old male who presents to clinic today for the following health issues:      Low back pain, and fever. No history of kidney issues. Has bladder removed.         Problem list and histories reviewed & adjusted, as indicated.  Additional history:     Patient Active Problem List   Diagnosis     Hyperlipidemia with target LDL less than 100     Hypertension goal BP (blood pressure) < 140/90     Elevated LFTs     Impaired fasting glucose     Fatty liver disease     CA - bladder cancer     Bladder cancer (H)     Cough     SBO (small bowel obstruction) (H)     Anxiety     COPD (chronic obstructive pulmonary disease) (H)     Past Surgical History:   Procedure Laterality Date     ABDOMEN SURGERY  07/30/2013    Radical Cystectomy     COLONOSCOPY  December 2012     CYSTOSCOPY, RETROGRADES, COMBINED  12/18/2012    Procedure: COMBINED CYSTOSCOPY, RETROGRADES;;  Surgeon: Von Zee MD;  Location:  OR     CYSTOSCOPY, RETROGRADES, COMBINED       CYSTOSCOPY, TRANSURETHRAL RESECTION (TUR) PROSTATE, COMBINED       CYSTOSCOPY, TRANSURETHRAL RESECTION (TUR) TUMOR BLADDER, COMBINED  12/18/2012    Procedure: COMBINED CYSTOSCOPY, TRANSURETHRAL RESECTION (TUR) TUMOR BLADDER;  CYSTOSCOPY, BILATERAL RETROGRADES AND TRANSURETHRAL RESECTION BLADDER TUMOR  (MITOMYCIN TREATMENT);  Surgeon: Von Zee MD;  Location:  OR     DAVINCI CYSTECTOMY BLADDER RADICAL, ILEAL DIVERSION, COMBINED  7/30/2013    Procedure: COMBINED DAVINCI CYSTECTOMY BLADDER RADICAL, ILEAL DIVERSION;  ROBOTIC ASSISTED CYSTOPROSTATECTOMY  WITH ILEO DIVERSION;  Surgeon: Von Zee MD;  Location:  OR     HC TOOTH EXTRACTION W/FORCEP       IR CHEST PORT PLACEMENT > 5 YRS OF AGE RIGHT         Social History   Substance Use Topics     Smoking status: Former Smoker     Packs/day: 0.50     Years: 20.00     Types: Cigarettes     Start date: 1/30/1992     Quit date: 12/14/2012      Smokeless tobacco: Never Used     Alcohol use Yes      Comment: social     Family History   Problem Relation Age of Onset     C.A.D. Father      CABG, LATE 50s     Family History Negative Father      Family History Negative Mother      Hypertension Maternal Grandfather      Hypertension Maternal Grandmother            Reviewed and updated as needed this visit by clinical staff  Tobacco  Allergies  Meds  Med Hx  Surg Hx  Fam Hx  Soc Hx      Reviewed and updated as needed this visit by Provider    Past Medical History:   Diagnosis Date     Bladder cancer (H)      COPD (chronic obstructive pulmonary disease) (H) 9/16/2016     Elevated LFTs      Gastro-oesophageal reflux disease     heartburn     GERD (gastroesophageal reflux disease)      Hyperlipidemia LDL goal < 100      Hypertension      Hypertension goal BP (blood pressure) < 140/90      Impaired fasting glucose      Liver disease     fatty liver disease     Tobacco abuse      Wheezes               ROS:  Constitutional, HEENT, cardiovascular, pulmonary, gi and gu systems are negative, except as otherwise noted.    OBJECTIVE:                                                    /78  Pulse 67  Temp 98.3  F (36.8  C) (Oral)  Resp 20  Wt 215 lb (97.5 kg)  SpO2 98%  BMI 30.2 kg/m2  Body mass index is 30.2 kg/(m^2).  GENERAL: alert and mild distress  HENT: normal cephalic/atraumatic, ear canals and TM's normal, nose and mouth without ulcers or lesions, nasal mucosa edematous , oropharynx clear and oral mucous membranes moist  NECK: no adenopathy, no asymmetry, masses, or scars and thyroid normal to palpation  RESP: lungs clear to auscultation - no rales, rhonchi or wheezes  CV: regular rate and rhythm, normal S1 S2, no S3 or S4, no murmur, click or rub, no peripheral edema and peripheral pulses strong  ABDOMEN: soft, nontender, no hepatosplenomegaly, no masses and bowel sounds normal  MS: noMS; low back pain    Diagnostic Test Results:        ASSESSMENT/PLAN:                                                              ICD-10-CM    1. Dysuria R30.0 *UA reflex to Microscopic and Culture ([place] [facility] (except [place] and [place])     Urine Microscopic     cefTRIAXone (ROCEPHIN) 1 GM vial     cefdinir (OMNICEF) 300 MG capsule     cefdinir (OMNICEF) 300 MG capsule       2. Probable kidney infection  3. Bladder CA. ; Illeal diversion      We will not culture his urine since we would expect there to be infection.    Gaurav Zamarripa MD  [facility]

## 2017-05-27 NOTE — PATIENT INSTRUCTIONS
1. Oral antibiotics.   2. Increased hydration    3. IM Rocephin    4. Follow with PCP Ms. Norwood in the coming week

## 2017-06-01 DIAGNOSIS — I10 ESSENTIAL HYPERTENSION WITH GOAL BLOOD PRESSURE LESS THAN 140/90: ICD-10-CM

## 2017-06-01 RX ORDER — ATENOLOL 100 MG/1
TABLET ORAL
Qty: 90 TABLET | Refills: 1 | OUTPATIENT
Start: 2017-06-01

## 2017-06-01 NOTE — TELEPHONE ENCOUNTER
Pending Prescriptions:                       Disp   Refills    atenolol (TENORMIN) 100 MG tablet [Pharma*90 tab*1            Sig: TAKE ONE TABLET BY MOUTH ONCE DAILY    MEDICATION WAS FILLED ON 03/31/2017 FOR 6 MONTHS    Ludwin WESTBROOK

## 2017-07-31 ENCOUNTER — HOSPITAL ENCOUNTER (OUTPATIENT)
Facility: CLINIC | Age: 52
Setting detail: SPECIMEN
Discharge: HOME | End: 2017-07-31
Attending: INTERNAL MEDICINE | Admitting: INTERNAL MEDICINE
Payer: COMMERCIAL

## 2017-07-31 ENCOUNTER — ONCOLOGY VISIT (OUTPATIENT)
Dept: ONCOLOGY | Facility: CLINIC | Age: 52
End: 2017-07-31
Attending: INTERNAL MEDICINE
Payer: COMMERCIAL

## 2017-07-31 DIAGNOSIS — C67.9 MALIGNANT NEOPLASM OF URINARY BLADDER, UNSPECIFIED SITE (H): ICD-10-CM

## 2017-07-31 LAB
ALBUMIN SERPL-MCNC: 3.8 G/DL (ref 3.4–5)
ALP SERPL-CCNC: 70 U/L (ref 40–150)
ALT SERPL W P-5'-P-CCNC: 44 U/L (ref 0–70)
ANION GAP SERPL CALCULATED.3IONS-SCNC: 7 MMOL/L (ref 3–14)
AST SERPL W P-5'-P-CCNC: 40 U/L (ref 0–45)
BASOPHILS # BLD AUTO: 0.1 10E9/L (ref 0–0.2)
BASOPHILS NFR BLD AUTO: 0.7 %
BILIRUB SERPL-MCNC: 0.6 MG/DL (ref 0.2–1.3)
BUN SERPL-MCNC: 12 MG/DL (ref 7–30)
CALCIUM SERPL-MCNC: 8.8 MG/DL (ref 8.5–10.1)
CHLORIDE SERPL-SCNC: 104 MMOL/L (ref 94–109)
CO2 SERPL-SCNC: 25 MMOL/L (ref 20–32)
CREAT SERPL-MCNC: 0.84 MG/DL (ref 0.66–1.25)
DIFFERENTIAL METHOD BLD: ABNORMAL
EOSINOPHIL # BLD AUTO: 0.3 10E9/L (ref 0–0.7)
EOSINOPHIL NFR BLD AUTO: 3.9 %
ERYTHROCYTE [DISTWIDTH] IN BLOOD BY AUTOMATED COUNT: 11.6 % (ref 10–15)
GFR SERPL CREATININE-BSD FRML MDRD: ABNORMAL ML/MIN/1.7M2
GLUCOSE SERPL-MCNC: 139 MG/DL (ref 70–99)
HCT VFR BLD AUTO: 39.2 % (ref 40–53)
HGB BLD-MCNC: 13.7 G/DL (ref 13.3–17.7)
IMM GRANULOCYTES # BLD: 0 10E9/L (ref 0–0.4)
IMM GRANULOCYTES NFR BLD: 0.3 %
LYMPHOCYTES # BLD AUTO: 3.4 10E9/L (ref 0.8–5.3)
LYMPHOCYTES NFR BLD AUTO: 39.6 %
MCH RBC QN AUTO: 33.5 PG (ref 26.5–33)
MCHC RBC AUTO-ENTMCNC: 34.9 G/DL (ref 31.5–36.5)
MCV RBC AUTO: 96 FL (ref 78–100)
MONOCYTES # BLD AUTO: 0.5 10E9/L (ref 0–1.3)
MONOCYTES NFR BLD AUTO: 5.8 %
NEUTROPHILS # BLD AUTO: 4.3 10E9/L (ref 1.6–8.3)
NEUTROPHILS NFR BLD AUTO: 49.7 %
NRBC # BLD AUTO: 0 10*3/UL
NRBC BLD AUTO-RTO: 0 /100
PLATELET # BLD AUTO: 178 10E9/L (ref 150–450)
POTASSIUM SERPL-SCNC: 4.3 MMOL/L (ref 3.4–5.3)
PROT SERPL-MCNC: 8.2 G/DL (ref 6.8–8.8)
RBC # BLD AUTO: 4.09 10E12/L (ref 4.4–5.9)
SODIUM SERPL-SCNC: 136 MMOL/L (ref 133–144)
VIT B12 SERPL-MCNC: 665 PG/ML (ref 193–986)
WBC # BLD AUTO: 8.6 10E9/L (ref 4–11)

## 2017-07-31 PROCEDURE — 36415 COLL VENOUS BLD VENIPUNCTURE: CPT

## 2017-07-31 PROCEDURE — 88112 CYTOPATH CELL ENHANCE TECH: CPT | Performed by: INTERNAL MEDICINE

## 2017-07-31 PROCEDURE — 85025 COMPLETE CBC W/AUTO DIFF WBC: CPT | Performed by: INTERNAL MEDICINE

## 2017-07-31 PROCEDURE — 88112 CYTOPATH CELL ENHANCE TECH: CPT | Mod: 26 | Performed by: INTERNAL MEDICINE

## 2017-07-31 PROCEDURE — 82607 VITAMIN B-12: CPT | Performed by: INTERNAL MEDICINE

## 2017-07-31 PROCEDURE — 80053 COMPREHEN METABOLIC PANEL: CPT | Performed by: INTERNAL MEDICINE

## 2017-07-31 NOTE — PROGRESS NOTES
Medical Assistant Note:  Casey Benitez presents today for Blood draw.    Patient seen by provider today: No.   present during visit today: Not Applicable.    Concerns: No Concerns.    Procedure:  Labs drawn: Yes.    Post Assessment:  Labs drawn without difficulty: Yes.    Discharge Plan:  Patient discharged in stable condition accompanied by: self.    Face to Face Time: 10 mins.    Bettie Ruffin

## 2017-07-31 NOTE — MR AVS SNAPSHOT
After Visit Summary   7/31/2017    Casey Benitez    MRN: [id number]           Patient Information     Date Of Birth          1965        Visit Information        Provider Department      7/31/2017 10:00 AM  ONCOLOGY NURSE [facility]        Today's Diagnoses     Malignant neoplasm of urinary bladder, unspecified site (H)           Follow-ups after your visit        Your next 10 appointments already scheduled     Aug 07, 2017  1:30 PM CDT   Return Visit with Constanza Mccarty MD   [facility] ([facility])    [facility]  [address]  [address]  [facility] [id number] [phone]              Who to contact     If you have questions or need follow up information about today's clinic visit or your schedule please contact [facility] directly at [phone].  Normal or non-critical lab and imaging results will be communicated to you by [employer], letter or phone within 4 business days after the clinic has received the results. If you do not hear from us within 7 days, please contact the clinic through [employer] or phone. If you have a critical or abnormal lab result, we will notify you by phone as soon as possible.  Submit refill requests through [employer] or call your pharmacy and they will forward the refill request to us. Please allow 3 business days for your refill to be completed.          Additional Information About Your Visit        MyChart Information     [employer] gives you secure access to your electronic health record. If you see a primary care provider, you can also send messages to your care team and make appointments. If you have questions, please call your primary care clinic.  If you do not have a primary care provider, please call [phone] and they will assist you.        Care EveryWhere ID     This is your Care EveryWhere ID. This could be used by other organizations to access  your [facility] medical records  [id number]         Blood Pressure from Last 3 Encounters:   05/26/17 132/78   03/31/17 122/72   02/06/17 149/79    Weight from Last 3 Encounters:   05/26/17 97.5 kg (215 lb)   03/31/17 97.9 kg (215 lb 12.8 oz)   02/06/17 100.4 kg (221 lb 4 oz)              We Performed the Following     CBC with platelets differential     Comprehensive metabolic panel     Cytology non gyn     Vitamin B12        Primary Care Provider Office Phone # Fax #    Enma Ann Aaseby-Aguilera, PA-C [phone]       [facility] [address] [address] [facility] [address]        Equal Access to Services     LORETTA TURNER : Hadii aad ku hadasho Sobraxton, waaxda luqadaha, qaybta kaalmada adeegyada, waxay estevan padron. So [facility] [phone].    ATENCIÓN: Si habla español, tiene a braxton disposición servicios gratuitos de asistencia lingüística. [facility] [phone].    We comply with applicable federal civil rights laws and [place] laws. We do not discriminate on the basis of race, color, national origin, age, disability sex, sexual orientation or gender identity.            Thank you!     Thank you for choosing [facility]  for your care. Our goal is always to provide you with excellent care. Hearing back from our patients is one way we can continue to improve our services. Please take a few minutes to complete the written survey that you may receive in the mail after your visit with us. Thank you!             Your Updated Medication List - Protect others around you: Learn how to safely use, store and throw away your medicines at www.disposemymeds.org.          This list is accurate as of: 7/31/17 10:16 AM.  Always use your most recent med list.                   Brand Name Dispense Instructions for use Diagnosis    atenolol 100 MG tablet    TENORMIN    90 tablet    Take 1 tablet (100 mg) by mouth daily    Essential hypertension with goal blood  pressure less than 140/90       budesonide-formoterol 160-4.5 MCG/ACT Inhaler    SYMBICORT    3 Inhaler    Inhale 2 puffs into the lungs 2 times daily as needed    Simple chronic bronchitis (H)       * cefdinir 300 MG capsule    OMNICEF    20 capsule    Take 1 capsule (300 mg) by mouth 2 times daily    Dysuria       * cefdinir 300 MG capsule    OMNICEF    20 capsule    Take 1 capsule (300 mg) by mouth 2 times daily    Dysuria       cyabnocobalamin 2500 MCG sublingual tablet    VITAMIN B-12    30 tablet    Place 2,500 mcg under the tongue daily    Vitamin B12 deficiency (non anemic), Malignant neoplasm of urinary bladder, unspecified site (H)       hydrochlorothiazide 12.5 MG Tabs tablet     90 tablet    Take 1 tablet (12.5 mg) by mouth daily    Hypertension goal BP (blood pressure) < 140/90       lisinopril 20 MG tablet    PRINIVIL/ZESTRIL    135 tablet    Take 1.5 tablets (30 mg) by mouth daily    Hypertension goal BP (blood pressure) < 140/90       LORazepam 1 MG tablet    ATIVAN    30 tablet    Take 0.5 tablets (0.5 mg) by mouth nightly as needed for anxiety    Anxiety       * Notice:  This list has 2 medication(s) that are the same as other medications prescribed for you. Read the directions carefully, and ask your doctor or other care provider to review them with you.

## 2017-08-04 LAB
COPATH REPORT: NORMAL
COPATH REPORT: NORMAL

## 2017-08-07 ENCOUNTER — ONCOLOGY VISIT (OUTPATIENT)
Dept: ONCOLOGY | Facility: CLINIC | Age: 52
End: 2017-08-07
Attending: INTERNAL MEDICINE
Payer: COMMERCIAL

## 2017-08-07 VITALS
SYSTOLIC BLOOD PRESSURE: 131 MMHG | HEART RATE: 57 BPM | HEIGHT: 71 IN | TEMPERATURE: 97.8 F | RESPIRATION RATE: 18 BRPM | BODY MASS INDEX: 29.99 KG/M2 | OXYGEN SATURATION: 96 % | DIASTOLIC BLOOD PRESSURE: 80 MMHG | WEIGHT: 214.2 LBS

## 2017-08-07 DIAGNOSIS — C67.9 MALIGNANT NEOPLASM OF URINARY BLADDER, UNSPECIFIED SITE (H): Primary | ICD-10-CM

## 2017-08-07 PROCEDURE — 99211 OFF/OP EST MAY X REQ PHY/QHP: CPT

## 2017-08-07 PROCEDURE — 99213 OFFICE O/P EST LOW 20 MIN: CPT | Performed by: INTERNAL MEDICINE

## 2017-08-07 ASSESSMENT — PAIN SCALES - GENERAL: PAINLEVEL: NO PAIN (0)

## 2017-08-07 NOTE — PATIENT INSTRUCTIONS
RTC MD six months  Labs before next visit- CBC diff, CMP, urine cytology and B12  CT scan chest abdomen and pelvis with contrast before next visit.

## 2017-08-07 NOTE — MR AVS SNAPSHOT
After Visit Summary   8/7/2017    Casey Benitez    MRN: [id number]           Patient Information     Date Of Birth          1965        Visit Information        Provider Department      8/7/2017 1:30 PM Constanza Mccarty MD [facility]      Today's Diagnoses     Malignant neoplasm of urinary bladder, unspecified site (H)    -  1      Care Instructions      RTC MD six months  Labs before next visit- CBC diff, CMP, urine cytology and B12  CT scan chest abdomen and pelvis with contrast before next visit.          Follow-ups after your visit        Your next 10 appointments already scheduled     Feb 02, 2018 11:00 AM CST   Return Visit with  ONCOLOGY NURSE   [facility] ([facility])    [facility]  [address]  [facility] [address] [phone]            Feb 02, 2018 11:30 AM CST   CT CHEST/ABDOMEN/PELVIS W CONTRAST with [address] Specialty [facility] ([facility])    [address]  [facility] [address]   [phone]           Please bring any scans or X-rays taken at other hospitals, if similar tests were done. Also bring a list of your medicines, including vitamins, minerals and over-the-counter drugs. It is safest to leave personal items at home.  Be sure to tell your doctor:   If you have any allergies.   If there s any chance you are pregnant.   If you are breastfeeding.   If you have any special needs.  You may have contrast for this exam. To prepare:   Do not eat or drink for 2 hours before your exam. If you need to take medicine, you may take it with small sips of water. (We may ask you to take liquid medicine as well.)   The day before your exam, drink extra fluids at least six 8-ounce glasses (unless your doctor tells you to restrict your fluids).  Patients over 70 or patients with diabetes or kidney problems:   If you  haven t had a blood test (creatinine test) within the last 30 days, go to your clinic or Diagnostic Imaging Department for this test.  If you have diabetes:   If your kidney function is normal, continue taking your metformin (Avandamet, Glucophage, Glucovance, Metaglip) on the day of your exam.   If your kidney function is abnormal, wait 48 hours before restarting this medicine.  You will have oral contrast for this exam:   You will drink the contrast at home. Get this from your clinic or Diagnostic Imaging Department. Please follow the directions given.  Please wear loose clothing, such as a sweat suit or jogging clothes. Avoid snaps, zippers and other metal. We may ask you to undress and put on a hospital gown.  If you have any questions, please call the Imaging Department where you will have your exam.            Feb 05, 2018  1:30 PM CST   Return Visit with Constanza Mccarty MD   [facility] ([facility])    [facility]  [address]  [facility] [address]  [facility] [id number] [phone]              Future tests that were ordered for you today     Open Future Orders        Priority Expected Expires Ordered    CBC with platelets differential Routine 2/7/2018 8/7/2018 8/7/2017    Comprehensive metabolic panel Routine 2/7/2018 8/7/2018 8/7/2017    Vitamin B12 Routine 2/7/2018 8/7/2018 8/7/2017    Cytology non gyn Routine 2/7/2018 8/7/2018 8/7/2017    CT Chest/Abdomen/Pelvis w Contrast Routine 2/7/2018 8/7/2018 8/7/2017            Who to contact     If you have questions or need follow up information about today's clinic visit or your schedule please contact [facility] directly at [phone].  Normal or non-critical lab and imaging results will be communicated to you by MyChart, letter or phone within 4 business days after the clinic has received the results. If you do not hear from us within 7 days, please contact the clinic through  "MyChart or phone. If you have a critical or abnormal lab result, we will notify you by phone as soon as possible.  Submit refill requests through [employer] or call your pharmacy and they will forward the refill request to us. Please allow 3 business days for your refill to be completed.          Additional Information About Your Visit        [employer] Information     [employer] gives you secure access to your electronic health record. If you see a primary care provider, you can also send messages to your care team and make appointments. If you have questions, please call your primary care clinic.  If you do not have a primary care provider, please call [phone] and they will assist you.        Care EveryWhere ID     This is your Care EveryWhere ID. This could be used by other organizations to access your [facility] medical records  [id number]        Your Vitals Were     Pulse Temperature Respirations Height Pulse Oximetry BMI (Body Mass Index)    57 97.8  F (36.6  C) (Tympanic) 18 1.797 m (5' 10.75\") 96% 30.09 kg/m2       Blood Pressure from Last 3 Encounters:   08/07/17 131/80   05/26/17 132/78   03/31/17 122/72    Weight from Last 3 Encounters:   08/07/17 97.2 kg (214 lb 3.2 oz)   05/26/17 97.5 kg (215 lb)   03/31/17 97.9 kg (215 lb 12.8 oz)               Primary Care Provider Office Phone # Fax #    Ramona Ann Aaseby-Aguilera, PA-C [phone]       [facility] [address] [facility] [address]        Equal Access to Services     [facility] : Hadii aad ku hadasho Soomaali, waaxda luqadaha, qaybta kaalmada adeegbalwinder, elie padron. So [facility] [phone].    ATENCIÓN: Si habla español, tiene a braxton disposición servicios gratuitos de asistencia lingüística. Kelechi al [phone].    We comply with applicable federal civil rights laws and [place] laws. We do not discriminate on the basis of race, color, national origin, age, disability sex, sexual orientation " or gender identity.            Thank you!     Thank you for choosing [facility]ER [facility]  for your care. Our goal is always to provide you with excellent care. Hearing back from our patients is one way we can continue to improve our services. Please take a few minutes to complete the written survey that you may receive in the mail after your visit with us. Thank you!             Your Updated Medication List - Protect others around you: Learn how to safely use, store and throw away your medicines at www.disposemymeds.org.          This list is accurate as of: 8/7/17  1:58 PM.  Always use your most recent med list.                   Brand Name Dispense Instructions for use Diagnosis    atenolol 100 MG tablet    TENORMIN    90 tablet    Take 1 tablet (100 mg) by mouth daily    Essential hypertension with goal blood pressure less than 140/90       budesonide-formoterol 160-4.5 MCG/ACT Inhaler    SYMBICORT    3 Inhaler    Inhale 2 puffs into the lungs 2 times daily as needed    Simple chronic bronchitis (H)       cyabnocobalamin 2500 MCG sublingual tablet    VITAMIN B-12    30 tablet    Place 2,500 mcg under the tongue daily    Vitamin B12 deficiency (non anemic), Malignant neoplasm of urinary bladder, unspecified site (H)       hydrochlorothiazide 12.5 MG Tabs tablet     90 tablet    Take 1 tablet (12.5 mg) by mouth daily    Hypertension goal BP (blood pressure) < 140/90       lisinopril 20 MG tablet    PRINIVIL/ZESTRIL    135 tablet    Take 1.5 tablets (30 mg) by mouth daily    Hypertension goal BP (blood pressure) < 140/90       LORazepam 1 MG tablet    ATIVAN    30 tablet    Take 0.5 tablets (0.5 mg) by mouth nightly as needed for anxiety    Anxiety

## 2017-08-07 NOTE — PROGRESS NOTES
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  He completed cycle 4 on 5/28/2013. He had some treatment delays due to thrombocytopenia.  He underwent surgery on 7/30 final pathology revealed a T3N0MX tumor. His CT showed a paratrachael node (left at 1.1 cm), concern for metastatic disease, therefore a PET was obtained which did not show this node being metabolically active.    SUBJECTIVE   Patient comes in for a follow up today. He has been feeling well. No new complaints.   HEMATOLOGY ONCOLOGY HISTORY   Per Dr. Stakrs's previous note:  Mr. Benitez is a 49 year old male who presents with new diagnosis of bladder cancer. In April of 2012, Casey noticed some blood in his urine which was confirmed by a UA. He was treated for urinary tract infection and failed to followup until November of this year he saw Dr. Zee.  Due to persistent painless hematuria, cystoscopy was obtained which revealed a large bladder tumor. Final pathology revealed a grade 3 PT2 muscle invasive transitional cell carcinoma of the bladder. During his initial visit, we discussed starting treatment in the neoadjuvant fashion with cisplatin and gemcitabine and the rationale behind the drugs and treatment.  Casey had his mediport placed. We obtained metastatic workup with CXR and a bone scan (rib pain) which did not reveal any evidence of metastatic disease  Casey started Cisplatin and Gemcitabine on 1/15/2013.  He completed cycle 4 on 5/28/2013. He had some treatment delays due to thrombocytopenia.  He feels like his Energy has improved. He underwent surgery on 7/30 final pathology revealed a T3N0MX tumor. He is feeling well after the surgery   His CT showed a paratrachael node (left at 1.1 cm),  "concern for metastatic disease, therefore a PET was obtained which did not show this node being metabolically active     REVIEW OF SYSTEMS   As above in the HPI, o/w a complete ROS was negative.    Past medical, social histories reviewed.    Meds- Reviewed.     PHYSICAL EXAM   /80 (Patient Position: Chair)  Pulse 57  Temp 97.8  F (36.6  C) (Tympanic)  Resp 18  Ht 1.797 m (5' 10.75\")  Wt 97.2 kg (214 lb 3.2 oz)  SpO2 96%  BMI 30.09 kg/m2    GEN: NAD  HEENT: PERRL, EOMI, no icterus, injection or pallor. Oropharynx is clear.  NECK: no cervical or supraclavicular lymphadenopathy  LUNGS: clear bilaterally  CV: regular, no murmurs.  ABDOMEN: soft, non-tender, non-distended, normal bowel sounds, no hepatosplenomegaly.  EXT: warm, well perfused, no edema  NEURO: alert  SKIN: no rashes    LABORATORY DATA AND IMAGING REVIEWED DURING THIS VISIT:  Recent Labs   Lab Test  07/31/17   1010  02/01/17   1056   05/06/13   0840   04/01/13   0935   NA  136  130*   < >  137   < >  138   POTASSIUM  4.3  3.7   < >  3.8   < >  4.1   CHLORIDE  104  95   < >  96   < >  100   CO2  25  26   < >  27   < >  30   ANIONGAP  7  9   < >  13.6   < >  9   BUN  12  11   < >  8   < >  9   CR  0.84  0.75   < >  0.82   < >  0.83   GLC  139*  119*   < >  173*   < >  96   KIRBY  8.8  9.2   < >  9.1   < >  9.2   MAG   --    --    --   1.7   --   2.0    < > = values in this interval not displayed.     Recent Labs   Lab Test  07/31/17   1010  02/01/17   1056  07/18/16   1317   WBC  8.6  11.7*  8.5   HGB  13.7  13.4  14.1   PLT  178  184  174   MCV  96  99  99   NEUTROPHIL  49.7  61.1  53.3     Recent Labs   Lab Test  07/31/17   1010  02/01/17   1056  07/18/16   1317   BILITOTAL  0.6  1.1  0.7   ALKPHOS  70  82  73   ALT  44  39  62   AST  40  32  51*   ALBUMIN  3.8  3.7  3.8         Results for orders placed or performed during the hospital encounter of 02/01/17   CT Chest/Abdomen/Pelvis w Contrast    Narrative    CT CHEST/ABDOMEN/PELVIS WITH " CONTRAST  2/1/2017 11:21 AM    HISTORY:  Follow up bladder cancer. Deficiency of other specified B  group vitamins.    TECHNIQUE: CT scan obtained of the chest, abdomen, and pelvis with  oral and IV contrast. 100 mL Isovue-370 injected. Radiation dose for  this scan was reduced using automated exposure control, adjustment of  the mA and/or kV according to patient size, or iterative  reconstruction technique.    COMPARISON:  CT chest, abdomen and pelvis 1/12/2016.    FINDINGS:  Chest: No enlarged lymph nodes identified in the chest. Stable tiny  right anterior mid chest pulmonary nodule that is 0.3 cm, series 3  image 31. A new left upper lobe midchest nodule likely represents  mucus impaction measuring 0.2 cm on image 31. No destructive-appearing  bony lesions identified. Some sclerosis along the inferior half of the  T11 vertebral body on coronal series 4 image 117 is stable and could  be degenerative in nature.    Abdomen/pelvis: No aggressive-appearing bony lesions.  Cystoprostatectomy and right lower quadrant ileal conduit again  identified. No acute bowel finding. Stable mild to moderate bilateral  hydronephrosis. Bilateral renal parenchyma enhances symmetrically.  Stable tiny hypodensity medial left kidney that is 0.4 cm, series 2  image 70. Liver demonstrates some mild nodularity without worrisome  focal lesion. Contracted gallbladder. Splenic calcifications. Pancreas  appears unremarkable. Vascular calcifications. No adenopathy  identified. A few minimally prominent upper abdominal lymph nodes are  again noted and appear stable.      Impression    IMPRESSION:  1. No evidence for convincing new disease.  2. Stable cystectomy and ileal diversion. Stable bilateral mild  hydronephrosis.  3. A new but small pulmonary nodule at the left upper lobe most likely  represents mucus impaction. Consider surveillance imaging.    CRYSTAL CHANDRA MD         ECOG PS: 1     ASSESSMENT   Mr. Benitez is a pleasant 52-year-old male  with diagnosis of transitional cell carcinoma of the bladder at least T2 NXMX s/p complete cystectomy with ileal diversion.  CT scan results 1/12/16 showed no evidence of recurrence.  Switched to yearly CT scan 1/2016.  [facility] to [facility]   - Labs were reviewed with the patient- stable. Negative urine cytology.   - He is on B12 supplementation.   - CT scan 2/1/2017 is without any evidence of disease    PLAN   RTC MD six months  Labs before next visit- CBC diff, CMP, urine cytology and B12  CT scan chest abdomen and pelvis with contrast before next visit.     Constanza Mccarty MD  8/7/2017    CC Von Ceballos MD

## 2017-08-07 NOTE — NURSING NOTE
"Oncology Rooming Note    August 7, 2017 1:46 PM   Casey Benitez is a 52 year old male who presents for:    Chief Complaint   Patient presents with     Oncology Clinic Visit     Follow-up     Initial Vitals: /80 (Patient Position: Chair)  Pulse 57  Temp 97.8  F (36.6  C) (Tympanic)  Resp 18  Ht 1.797 m (5' 10.75\")  Wt 97.2 kg (214 lb 3.2 oz)  SpO2 96%  BMI 30.09 kg/m2 Estimated body mass index is 30.09 kg/(m^2) as calculated from the following:    Height as of this encounter: 1.797 m (5' 10.75\").    Weight as of this encounter: 97.2 kg (214 lb 3.2 oz). Body surface area is 2.2 meters squared.  No Pain (0) Comment: Data Unavailable   No LMP for male patient.  Allergies reviewed: Yes  Medications reviewed: Yes    Medications: Medication refills not needed today.  Pharmacy name entered into [employer]:    [facility] [address] [facility] [address] [facility] [address]    Clinical concerns: Follow-up    8 minutes for nursing intake (face to face time)     Emili Shelley    DISCHARGE PLAN:  Next appointments: See patient instruction section  Departure Mode: Ambulatory  Accompanied by: self  0 minutes for nursing discharge (face to face time)   Emili Shelley              "

## 2017-11-22 DIAGNOSIS — I10 ESSENTIAL HYPERTENSION WITH GOAL BLOOD PRESSURE LESS THAN 140/90: ICD-10-CM

## 2017-11-22 RX ORDER — ATENOLOL 100 MG/1
TABLET ORAL
Qty: 90 TABLET | Refills: 1 | Status: SHIPPED | OUTPATIENT
Start: 2017-11-22 | End: 2018-05-17

## 2017-11-22 NOTE — TELEPHONE ENCOUNTER
Prescription approved per [facility] Refill Protocol.  Ricardo Lopez RN, BSN

## 2018-01-29 ENCOUNTER — OFFICE VISIT (OUTPATIENT)
Dept: FAMILY MEDICINE | Facility: CLINIC | Age: 53
End: 2018-01-29
Payer: COMMERCIAL

## 2018-01-29 VITALS
WEIGHT: 209.7 LBS | TEMPERATURE: 97.5 F | SYSTOLIC BLOOD PRESSURE: 139 MMHG | DIASTOLIC BLOOD PRESSURE: 80 MMHG | HEIGHT: 71 IN | HEART RATE: 54 BPM | BODY MASS INDEX: 29.36 KG/M2 | OXYGEN SATURATION: 99 %

## 2018-01-29 DIAGNOSIS — R82.90 NONSPECIFIC FINDING ON EXAMINATION OF URINE: ICD-10-CM

## 2018-01-29 DIAGNOSIS — N45.1 EPIDIDYMITIS: Primary | ICD-10-CM

## 2018-01-29 DIAGNOSIS — N50.819 TESTICULAR PAIN: ICD-10-CM

## 2018-01-29 DIAGNOSIS — C67.9 MALIGNANT NEOPLASM OF URINARY BLADDER, UNSPECIFIED SITE (H): ICD-10-CM

## 2018-01-29 DIAGNOSIS — Z11.59 NEED FOR HEPATITIS C SCREENING TEST: ICD-10-CM

## 2018-01-29 DIAGNOSIS — J42 CHRONIC BRONCHITIS, UNSPECIFIED CHRONIC BRONCHITIS TYPE (H): ICD-10-CM

## 2018-01-29 LAB
ALBUMIN UR-MCNC: NEGATIVE MG/DL
AMORPH CRY #/AREA URNS HPF: ABNORMAL /HPF
APPEARANCE UR: ABNORMAL
BACTERIA #/AREA URNS HPF: ABNORMAL /HPF
BILIRUB UR QL STRIP: NEGATIVE
COLOR UR AUTO: YELLOW
CREAT UR-MCNC: 85 MG/DL
GLUCOSE UR STRIP-MCNC: NEGATIVE MG/DL
HGB UR QL STRIP: ABNORMAL
KETONES UR STRIP-MCNC: NEGATIVE MG/DL
LEUKOCYTE ESTERASE UR QL STRIP: ABNORMAL
MICROALBUMIN UR-MCNC: 10 MG/L
MICROALBUMIN/CREAT UR: 11.64 MG/G CR (ref 0–17)
NITRATE UR QL: POSITIVE
PH UR STRIP: 7 PH (ref 5–7)
RBC #/AREA URNS AUTO: ABNORMAL /HPF
SOURCE: ABNORMAL
SP GR UR STRIP: 1.01 (ref 1–1.03)
UROBILINOGEN UR STRIP-ACNC: 0.2 EU/DL (ref 0.2–1)
WBC #/AREA URNS AUTO: ABNORMAL /HPF

## 2018-01-29 PROCEDURE — 36415 COLL VENOUS BLD VENIPUNCTURE: CPT | Performed by: PHYSICIAN ASSISTANT

## 2018-01-29 PROCEDURE — 86803 HEPATITIS C AB TEST: CPT | Performed by: PHYSICIAN ASSISTANT

## 2018-01-29 PROCEDURE — 82043 UR ALBUMIN QUANTITATIVE: CPT | Performed by: PHYSICIAN ASSISTANT

## 2018-01-29 PROCEDURE — 99213 OFFICE O/P EST LOW 20 MIN: CPT | Performed by: PHYSICIAN ASSISTANT

## 2018-01-29 PROCEDURE — 87086 URINE CULTURE/COLONY COUNT: CPT | Performed by: PHYSICIAN ASSISTANT

## 2018-01-29 PROCEDURE — 81001 URINALYSIS AUTO W/SCOPE: CPT | Performed by: PHYSICIAN ASSISTANT

## 2018-01-29 RX ORDER — LEVOFLOXACIN 500 MG/1
500 TABLET, FILM COATED ORAL DAILY
Qty: 10 TABLET | Refills: 0 | Status: SHIPPED | OUTPATIENT
Start: 2018-01-29 | End: 2018-02-09

## 2018-01-29 NOTE — MR AVS SNAPSHOT
After Visit Summary   1/29/2018    Casey Benitez    MRN: [id number]           Patient Information     Date Of Birth          1965        Visit Information        Provider Department      1/29/2018 10:15 AM Aaseby-Aguilera, Ramona Ann, PA-C [facility]        Today's Diagnoses     Epididymitis    -  1    Testicular pain        Chronic bronchitis, unspecified chronic bronchitis type (H)        Malignant neoplasm of urinary bladder, unspecified site (H)          Care Instructions    (J42) Chronic bronchitis, unspecified chronic bronchitis type (H)  (primary encounter diagnosis)  Comment:   Plan:     (N50.819) Testicular pain  Comment:   Plan: UA reflex to Microscopic and Culture            (C67.9) Malignant neoplasm of urinary bladder, unspecified site (H)  Comment:   Plan: has a urostomy bad and no prostrate.  Has appt with oncologist next week.     (N45.1) Epididymitis  Comment:   Plan: levofloxacin (LEVAQUIN) 500 MG tablet                    Follow-ups after your visit        Your next 10 appointments already scheduled     Feb 02, 2018 11:00 AM CST   Return Visit with  ONCOLOGY NURSE   [facility] ([facility])    [facility]  [address]  [facility] [address]   [phone]            Feb 02, 2018 11:30 AM CST   CT CHEST/ABDOMEN/PELVIS W CONTRAST with [address] [facility] ([facility])    [address] [facility] [address]  [facility] [address]   [phone]           Please bring any scans or X-rays taken at other hospitals, if similar tests were done. Also bring a list of your medicines, including vitamins, minerals and over-the-counter drugs. It is safest to leave personal items at home.  Be sure to tell your doctor:   If you have any allergies.   If there s any chance you are pregnant.   If you are breastfeeding.   If you have any special needs.  You may  have contrast for this exam. To prepare:   Do not eat or drink for 2 hours before your exam. If you need to take medicine, you may take it with small sips of water. (We may ask you to take liquid medicine as well.)   The day before your exam, drink extra fluids at least six 8-ounce glasses (unless your doctor tells you to restrict your fluids).  Patients over 70 or patients with diabetes or kidney problems:   If you haven t had a blood test (creatinine test) within the last 30 days, go to your clinic or Diagnostic Imaging Department for this test.  If you have diabetes:   If your kidney function is normal, continue taking your metformin (Avandamet, Glucophage, Glucovance, Metaglip) on the day of your exam.   If your kidney function is abnormal, wait 48 hours before restarting this medicine.  You will have oral contrast for this exam:   You will drink the contrast at home. Get this from your clinic or Diagnostic Imaging Department. Please follow the directions given.  Please wear loose clothing, such as a sweat suit or jogging clothes. Avoid snaps, zippers and other metal. We may ask you to undress and put on a hospital gown.  If you have any questions, please call the Imaging Department where you will have your exam.            Feb 05, 2018  1:30 PM CST   Return Visit with Constanza Mccarty MD   [facility] ([facility])    [facility]  [address]  [facility] [id number] [phone]              Who to contact     If you have questions or need follow up information about today's clinic visit or your schedule please contact [facility] directly at [phone].  Normal or non-critical lab and imaging results will be communicated to you by MyChart, letter or phone within 4 business days after the clinic has received the results. If you do not hear from us within 7 days, please contact the clinic through MyChart or phone. If you have  "a critical or abnormal lab result, we will notify you by phone as soon as possible.  Submit refill requests through [employer] or call your pharmacy and they will forward the refill request to us. Please allow 3 business days for your refill to be completed.          Additional Information About Your Visit        Scan[employer] Information     [employer] gives you secure access to your electronic health record. If you see a primary care provider, you can also send messages to your care team and make appointments. If you have questions, please call your primary care clinic.  If you do not have a primary care provider, please call [phone] and they will assist you.        Care EveryWhere ID     This is your Care EveryWhere ID. This could be used by other organizations to access your [facility] medical records  [id number]        Your Vitals Were     Pulse Temperature Height Pulse Oximetry BMI (Body Mass Index)       54 97.5  F (36.4  C) (Oral) 5' 10.75\" (1.797 m) 99% 29.45 kg/m2        Blood Pressure from Last 3 Encounters:   01/29/18 139/80   08/07/17 131/80   05/26/17 132/78    Weight from Last 3 Encounters:   01/29/18 209 lb 11.2 oz (95.1 kg)   08/07/17 214 lb 3.2 oz (97.2 kg)   05/26/17 215 lb (97.5 kg)              We Performed the Following     UA reflex to Microscopic and Culture          Today's Medication Changes          These changes are accurate as of 1/29/18 10:47 AM.  If you have any questions, ask your nurse or doctor.               Start taking these medicines.        Dose/Directions    levofloxacin 500 MG tablet   Commonly known as:  LEVAQUIN   Used for:  Epididymitis   Started by:  Aaseby-Aguilera, Ramona Ann, PA-C        Dose:  500 mg   Take 1 tablet (500 mg) by mouth daily   Quantity:  10 tablet   Refills:  0         These medicines have changed or have updated prescriptions.        Dose/Directions    lisinopril 20 MG tablet   Commonly known as:  PRINIVIL/ZESTRIL   This may have changed:  how much to take "   Used for:  Hypertension goal BP (blood pressure) < 140/90        Dose:  30 mg   Take 1.5 tablets (30 mg) by mouth daily   Quantity:  135 tablet   Refills:  3            Where to get your medicines      These medications were sent to [facility] [address] [address] [facility] [address] [facility]  [address] [facility] [address]    Hours:  Tech issues with their phone system Phone:  [phone]     levofloxacin 500 MG tablet                Primary Care Provider Office Phone # Fax #    Ramona Ann Aaseby-Aguilera, PA-C [phone] [address]  [facility] [address]        Equal Access to Services     LORETTA TURNER : Hadii aad óscar hadasho Soomaali, waaxda luqadaha, qaybta kaalmada adeegyada, waxbruce padron. So [facility] [phone].    ATENCIÓN: Si habla español, tiene a braxton disposición servicios gratuitos de asistencia lingüística. [facility] [phone].    We comply with applicable federal civil rights laws and [place] laws. We do not discriminate on the basis of race, color, national origin, age, disability, sex, sexual orientation, or gender identity.            Thank you!     Thank you for choosing [facility]  for your care. Our goal is always to provide you with excellent care. Hearing back from our patients is one way we can continue to improve our services. Please take a few minutes to complete the written survey that you may receive in the mail after your visit with us. Thank you!             Your Updated Medication List - Protect others around you: Learn how to safely use, store and throw away your medicines at www.disposemymeds.org.          This list is accurate as of 1/29/18 10:47 AM.  Always use your most recent med list.                   Brand Name Dispense Instructions for use Diagnosis    atenolol 100 MG tablet    TENORMIN    90 tablet    TAKE 1 TABLET (100 MG) BY MOUTH DAILY    Essential hypertension with goal blood pressure less than  140/90       budesonide-formoterol 160-4.5 MCG/ACT Inhaler    SYMBICORT    3 Inhaler    Inhale 2 puffs into the lungs 2 times daily as needed    Simple chronic bronchitis (H)       cyabnocobalamin 2500 MCG sublingual tablet    VITAMIN B-12    30 tablet    Place 2,500 mcg under the tongue daily    Vitamin B12 deficiency (non anemic), Malignant neoplasm of urinary bladder, unspecified site (H)       hydrochlorothiazide 12.5 MG Tabs tablet     90 tablet    Take 1 tablet (12.5 mg) by mouth daily    Hypertension goal BP (blood pressure) < 140/90       levofloxacin 500 MG tablet    LEVAQUIN    10 tablet    Take 1 tablet (500 mg) by mouth daily    Epididymitis       lisinopril 20 MG tablet    PRINIVIL/ZESTRIL    135 tablet    Take 1.5 tablets (30 mg) by mouth daily    Hypertension goal BP (blood pressure) < 140/90       LORazepam 1 MG tablet    ATIVAN    30 tablet    Take 0.5 tablets (0.5 mg) by mouth nightly as needed for anxiety    Anxiety

## 2018-01-29 NOTE — NURSING NOTE
"Chief Complaint   Patient presents with     possible infection on left testicle       Initial /80 (BP Location: Right arm, Patient Position: Chair, Cuff Size: Adult Large)  Pulse 54  Temp 97.5  F (36.4  C) (Oral)  Ht 5' 10.75\" (1.797 m)  Wt 209 lb 11.2 oz (95.1 kg)  SpO2 99%  BMI 29.45 kg/m2 Estimated body mass index is 29.45 kg/(m^2) as calculated from the following:    Height as of this encounter: 5' 10.75\" (1.797 m).    Weight as of this encounter: 209 lb 11.2 oz (95.1 kg).  Medication Reconciliation: complete      Health Maintenance addressed:  NONE    n/a      "

## 2018-01-29 NOTE — PATIENT INSTRUCTIONS
(J42) Chronic bronchitis, unspecified chronic bronchitis type (H)  (primary encounter diagnosis)  Comment:   Plan:     (N50.819) Testicular pain  Comment:   Plan: UA reflex to Microscopic and Culture            (C67.9) Malignant neoplasm of urinary bladder, unspecified site (H)  Comment:   Plan: has a urostomy bad and no prostrate.  Has appt with oncologist next week.     (N45.1) Epididymitis  Comment:   Plan: levofloxacin (LEVAQUIN) 500 MG tablet

## 2018-01-29 NOTE — PROGRESS NOTES
SUBJECTIVE:   Casey Benitez is a 53 year old male who presents to clinic today for the following health issues:      Possible infection left testicle Casey states he noticed over the weekend that he had left testicular pain now seems to have traveled to the right testicle. He has a urostomy from previous surgery for bladder cancer. He does not have prostate. States he feels some suprapubic pubic discomfort and some low back pain. Denies fever        Problem list and histories reviewed & adjusted, as indicated.  Additional history: as documented    Current Outpatient Prescriptions   Medication Sig Dispense Refill     levofloxacin (LEVAQUIN) 500 MG tablet Take 1 tablet (500 mg) by mouth daily 10 tablet 0     atenolol (TENORMIN) 100 MG tablet TAKE 1 TABLET (100 MG) BY MOUTH DAILY 90 tablet 1     hydrochlorothiazide 12.5 MG TABS tablet Take 1 tablet (12.5 mg) by mouth daily 90 tablet 3     lisinopril (PRINIVIL/ZESTRIL) 20 MG tablet Take 1.5 tablets (30 mg) by mouth daily (Patient taking differently: Take 20 mg by mouth daily ) 135 tablet 3     budesonide-formoterol (SYMBICORT) 160-4.5 MCG/ACT Inhaler Inhale 2 puffs into the lungs 2 times daily as needed 3 Inhaler 2     cyabnocobalamin (VITAMIN B-12) 2500 MCG sublingual tablet Place 2,500 mcg under the tongue daily 30 tablet 3     LORazepam (ATIVAN) 1 MG tablet Take 0.5 tablets (0.5 mg) by mouth nightly as needed for anxiety (Patient not taking: Reported on 1/29/2018) 30 tablet 0     BP Readings from Last 3 Encounters:   01/29/18 139/80   08/07/17 131/80   05/26/17 132/78    Wt Readings from Last 3 Encounters:   01/29/18 209 lb 11.2 oz (95.1 kg)   08/07/17 214 lb 3.2 oz (97.2 kg)   05/26/17 215 lb (97.5 kg)                    Reviewed and updated as needed this visit by clinical staff  Tobacco  Allergies  Meds  Med Hx  Surg Hx  Fam Hx  Soc Hx      Reviewed and updated as needed this visit by Provider         ROS:  Constitutional, HEENT, cardiovascular, pulmonary,  "gi and gu systems are negative, except as otherwise noted.    OBJECTIVE:                                                    /80 (BP Location: Right arm, Patient Position: Chair, Cuff Size: Adult Large)  Pulse 54  Temp 97.5  F (36.4  C) (Oral)  Ht 5' 10.75\" (1.797 m)  Wt 209 lb 11.2 oz (95.1 kg)  SpO2 99%  BMI 29.45 kg/m2  Body mass index is 29.45 kg/(m^2).  GENERAL APPEARANCE: healthy, alert and no distress  RESP: lungs clear to auscultation - no rales, rhonchi or wheezes  CV: regular rates and rhythm, normal S1 S2, no S3 or S4 and no murmur, click or rub  ABDOMEN: soft, nontender, without hepatosplenomegaly or masses and bowel sounds normal   (male): testicles TTP over epidiymis  without atrophy or masses, no hernias and penis normal without urethral discharge    Diagnostic test results:  Diagnostic Test Results:  Results for orders placed or performed in visit on 01/29/18 (from the past 24 hour(s))   UA reflex to Microscopic and Culture   Result Value Ref Range    Color Urine Yellow     Appearance Urine Slightly Cloudy     Glucose Urine Negative NEG^Negative mg/dL    Bilirubin Urine Negative NEG^Negative    Ketones Urine Negative NEG^Negative mg/dL    Specific Gravity Urine 1.010 1.003 - 1.035    Blood Urine Small (A) NEG^Negative    pH Urine 7.0 5.0 - 7.0 pH    Protein Albumin Urine Negative NEG^Negative mg/dL    Urobilinogen Urine 0.2 0.2 - 1.0 EU/dL    Nitrite Urine Positive (A) NEG^Negative    Leukocyte Esterase Urine Trace (A) NEG^Negative    Source Midstream Urine    Urine Microscopic   Result Value Ref Range    WBC Urine 5-10 (A) OTO2^O - 2 /HPF    RBC Urine 2-5 (A) OTO2^O - 2 /HPF    Bacteria Urine Few (A) NEG^Negative /HPF    Amorphous Crystals Moderate (A) NEG^Negative /HPF        ASSESSMENT/PLAN:                                                    1. Testicular pain    - UA reflex to Microscopic and Culture    2. Chronic bronchitis, unspecified chronic bronchitis type (H)  Stable     3. " Malignant neoplasm of urinary bladder, unspecified site (H)  Stable has follow-up next week with oncologist and urologist    4. Epididymitis  Well treat for epididymitis with levaquin 500 mg qd x 10 days.  Please follow-up if symptoms fail to resolve or worsen    - levofloxacin (LEVAQUIN) 500 MG tablet; Take 1 tablet (500 mg) by mouth daily  Dispense: 10 tablet; Refill: 0  - Albumin Random Urine Quantitative with Creat Ratio  - Urine Microscopic    5. Need for hepatitis C screening test    - Hepatitis C Screen Reflex to HCV RNA Quant and Genotype    6. Nonspecific finding on examination of urine    - Urine Culture Aerobic Bacterial      Patient Instructions   (J42) Chronic bronchitis, unspecified chronic bronchitis type (H)  (primary encounter diagnosis)  Comment:   Plan:     (N50.819) Testicular pain  Comment:   Plan: UA reflex to Microscopic and Culture            (C67.9) Malignant neoplasm of urinary bladder, unspecified site (H)  Comment:   Plan: has a urostomy bad and no prostrate.  Has appt with oncologist next week.     (N45.1) Epididymitis  Comment:   Plan: levofloxacin (LEVAQUIN) 500 MG tablet                Ramona Ann Aaseby-Aguilera, PA-C  [facility]

## 2018-01-30 LAB
BACTERIA SPEC CULT: NORMAL
HCV AB SERPL QL IA: NONREACTIVE
SPECIMEN SOURCE: NORMAL

## 2018-02-02 ENCOUNTER — HOSPITAL ENCOUNTER (OUTPATIENT)
Dept: CT IMAGING | Facility: CLINIC | Age: 53
Discharge: HOME | End: 2018-02-02
Attending: INTERNAL MEDICINE | Admitting: INTERNAL MEDICINE
Payer: COMMERCIAL

## 2018-02-02 ENCOUNTER — HOSPITAL ENCOUNTER (OUTPATIENT)
Facility: CLINIC | Age: 53
Setting detail: SPECIMEN
Discharge: HOME | End: 2018-02-02
Attending: INTERNAL MEDICINE | Admitting: INTERNAL MEDICINE
Payer: COMMERCIAL

## 2018-02-02 ENCOUNTER — ONCOLOGY VISIT (OUTPATIENT)
Dept: ONCOLOGY | Facility: CLINIC | Age: 53
End: 2018-02-02
Attending: INTERNAL MEDICINE
Payer: COMMERCIAL

## 2018-02-02 DIAGNOSIS — C67.9 MALIGNANT NEOPLASM OF URINARY BLADDER, UNSPECIFIED SITE (H): ICD-10-CM

## 2018-02-02 LAB
ALBUMIN SERPL-MCNC: 4 G/DL (ref 3.4–5)
ALP SERPL-CCNC: 77 U/L (ref 40–150)
ALT SERPL W P-5'-P-CCNC: 44 U/L (ref 0–70)
ANION GAP SERPL CALCULATED.3IONS-SCNC: 5 MMOL/L (ref 3–14)
AST SERPL W P-5'-P-CCNC: 31 U/L (ref 0–45)
BASOPHILS # BLD AUTO: 0.1 10E9/L (ref 0–0.2)
BASOPHILS NFR BLD AUTO: 0.6 %
BILIRUB SERPL-MCNC: 0.5 MG/DL (ref 0.2–1.3)
BUN SERPL-MCNC: 13 MG/DL (ref 7–30)
CALCIUM SERPL-MCNC: 9.2 MG/DL (ref 8.5–10.1)
CHLORIDE SERPL-SCNC: 103 MMOL/L (ref 94–109)
CO2 SERPL-SCNC: 29 MMOL/L (ref 20–32)
CREAT SERPL-MCNC: 0.84 MG/DL (ref 0.66–1.25)
DIFFERENTIAL METHOD BLD: ABNORMAL
EOSINOPHIL # BLD AUTO: 0.3 10E9/L (ref 0–0.7)
EOSINOPHIL NFR BLD AUTO: 2.9 %
ERYTHROCYTE [DISTWIDTH] IN BLOOD BY AUTOMATED COUNT: 12 % (ref 10–15)
GFR SERPL CREATININE-BSD FRML MDRD: >90 ML/MIN/1.7M2
GLUCOSE SERPL-MCNC: 103 MG/DL (ref 70–99)
HCT VFR BLD AUTO: 42.7 % (ref 40–53)
HGB BLD-MCNC: 14.1 G/DL (ref 13.3–17.7)
IMM GRANULOCYTES # BLD: 0 10E9/L (ref 0–0.4)
IMM GRANULOCYTES NFR BLD: 0.2 %
LYMPHOCYTES # BLD AUTO: 3.7 10E9/L (ref 0.8–5.3)
LYMPHOCYTES NFR BLD AUTO: 40.7 %
MCH RBC QN AUTO: 32.2 PG (ref 26.5–33)
MCHC RBC AUTO-ENTMCNC: 33 G/DL (ref 31.5–36.5)
MCV RBC AUTO: 98 FL (ref 78–100)
MONOCYTES # BLD AUTO: 0.8 10E9/L (ref 0–1.3)
MONOCYTES NFR BLD AUTO: 8.4 %
NEUTROPHILS # BLD AUTO: 4.3 10E9/L (ref 1.6–8.3)
NEUTROPHILS NFR BLD AUTO: 47.2 %
NRBC # BLD AUTO: 0 10*3/UL
NRBC BLD AUTO-RTO: 0 /100
PLATELET # BLD AUTO: 177 10E9/L (ref 150–450)
POTASSIUM SERPL-SCNC: 4 MMOL/L (ref 3.4–5.3)
PROT SERPL-MCNC: 8.4 G/DL (ref 6.8–8.8)
RBC # BLD AUTO: 4.38 10E12/L (ref 4.4–5.9)
SODIUM SERPL-SCNC: 137 MMOL/L (ref 133–144)
VIT B12 SERPL-MCNC: 658 PG/ML (ref 193–986)
WBC # BLD AUTO: 9 10E9/L (ref 4–11)

## 2018-02-02 PROCEDURE — 25000128 H RX IP 250 OP 636: Performed by: RADIOLOGY

## 2018-02-02 PROCEDURE — 80053 COMPREHEN METABOLIC PANEL: CPT | Performed by: INTERNAL MEDICINE

## 2018-02-02 PROCEDURE — 88112 CYTOPATH CELL ENHANCE TECH: CPT | Performed by: INTERNAL MEDICINE

## 2018-02-02 PROCEDURE — 82607 VITAMIN B-12: CPT | Performed by: INTERNAL MEDICINE

## 2018-02-02 PROCEDURE — 88112 CYTOPATH CELL ENHANCE TECH: CPT | Mod: 26 | Performed by: INTERNAL MEDICINE

## 2018-02-02 PROCEDURE — 85025 COMPLETE CBC W/AUTO DIFF WBC: CPT | Performed by: INTERNAL MEDICINE

## 2018-02-02 PROCEDURE — 36415 COLL VENOUS BLD VENIPUNCTURE: CPT

## 2018-02-02 PROCEDURE — 71260 CT THORAX DX C+: CPT

## 2018-02-02 RX ORDER — IOPAMIDOL 755 MG/ML
500 INJECTION, SOLUTION INTRAVASCULAR ONCE
Status: COMPLETED | OUTPATIENT
Start: 2018-02-02 | End: 2018-02-02

## 2018-02-02 RX ADMIN — SODIUM CHLORIDE 65 ML: 9 INJECTION, SOLUTION INTRAVENOUS at 11:48

## 2018-02-02 RX ADMIN — IOPAMIDOL 100 ML: 755 INJECTION, SOLUTION INTRAVENOUS at 11:48

## 2018-02-02 NOTE — MR AVS SNAPSHOT
After Visit Summary   2/2/2018    Casey Benitez    MRN: [id number]           Patient Information     Date Of Birth          1965        Visit Information        Provider Department      2/2/2018 11:00 AM  ONCOLOGY NURSE [facility]        Today's Diagnoses     Malignant neoplasm of urinary bladder, unspecified site (H)           Follow-ups after your visit        Your next 10 appointments already scheduled     Feb 02, 2018 11:30 AM CST   CT CHEST/ABDOMEN/PELVIS W CONTRAST with [id number]   [facility]    [address] [address]  [facility] [id number] [phone]           Please bring any scans or X-rays taken at other hospitals, if similar tests were done. Also bring a list of your medicines, including vitamins, minerals and over-the-counter drugs. It is safest to leave personal items at home.  Be sure to tell your doctor:   If you have any allergies.   If there s any chance you are pregnant.   If you are breastfeeding.   If you have any special needs.  You may have contrast for this exam. To prepare:   Do not eat or drink for 2 hours before your exam. If you need to take medicine, you may take it with small sips of water. (We may ask you to take liquid medicine as well.)   The day before your exam, drink extra fluids at least six 8-ounce glasses (unless your doctor tells you to restrict your fluids).  Patients over 70 or patients with diabetes or kidney problems:   If you haven t had a blood test (creatinine test) within the last 30 days, go to your clinic or Diagnostic Imaging Department for this test.  If you have diabetes:   If your kidney function is normal, continue taking your metformin (Avandamet, Glucophage, Glucovance, Metaglip) on the day of your exam.   If your kidney function is abnormal, wait 48 hours before restarting this medicine.  You will have oral contrast for this exam:   You will  drink the contrast at home. Get this from your clinic or Diagnostic Imaging Department. Please follow the directions given.  Please wear loose clothing, such as a sweat suit or jogging clothes. Avoid snaps, zippers and other metal. We may ask you to undress and put on a hospital gown.  If you have any questions, please call the Imaging Department where you will have your exam.            Feb 05, 2018  1:30 PM CST   Return Visit with Constanza Mccarty MD   [facility] ([facility])    [facility]  [address] [place]  [facility] [id number] [phone]              Who to contact     If you have questions or need follow up information about today's clinic visit or your schedule please contact [facility] directly at [phone].  Normal or non-critical lab and imaging results will be communicated to you by MyChart, letter or phone within 4 business days after the clinic has received the results. If you do not hear from us within 7 days, please contact the clinic through UB.hart or phone. If you have a critical or abnormal lab result, we will notify you by phone as soon as possible.  Submit refill requests through [employer] or call your pharmacy and they will forward the refill request to us. Please allow 3 business days for your refill to be completed.          Additional Information About Your Visit        [employer] Information     [employer] gives you secure access to your electronic health record. If you see a primary care provider, you can also send messages to your care team and make appointments. If you have questions, please call your primary care clinic.  If you do not have a primary care provider, please call [phone] and they will assist you.        Care EveryWhere ID     This is your Care EveryWhere ID. This could be used by other organizations to access your [place] medical records  [id number]         Blood Pressure  from Last 3 Encounters:   01/29/18 139/80   08/07/17 131/80   05/26/17 132/78    Weight from Last 3 Encounters:   01/29/18 95.1 kg (209 lb 11.2 oz)   08/07/17 97.2 kg (214 lb 3.2 oz)   05/26/17 97.5 kg (215 lb)              We Performed the Following     CBC with platelets differential     Comprehensive metabolic panel     Cytology non gyn     Vitamin B12          Today's Medication Changes          These changes are accurate as of 2/2/18 11:18 AM.  If you have any questions, ask your nurse or doctor.               These medicines have changed or have updated prescriptions.        Dose/Directions    lisinopril 20 MG tablet   Commonly known as:  PRINIVIL/ZESTRIL   This may have changed:  how much to take   Used for:  Hypertension goal BP (blood pressure) < 140/90        Dose:  30 mg   Take 1.5 tablets (30 mg) by mouth daily   Quantity:  135 tablet   Refills:  3                Primary Care Provider Office Phone # Fax #    Ramona Ann Aaseby-Aguilera, PA-C [phone] [address] [facility] [address]        Equal Access to Services     [facility] : Hadii alan cantrell hadmohamudo Sobraxton, waaxda luqadaha, qaybta kaalmada adejaylin, elie lorenzana . So [facility] [phone].    ATENCIÓN: Si habla español, tiene a braxton disposición servicios gratuitos de asistencia lingüística. [facility] [phone].    We comply with applicable federal civil rights laws and [place] laws. We do not discriminate on the basis of race, color, national origin, age, disability, sex, sexual orientation, or gender identity.            Thank you!     Thank you for choosing [facility]  for your care. Our goal is always to provide you with excellent care. Hearing back from our patients is one way we can continue to improve our services. Please take a few minutes to complete the written survey that you may receive in the mail after your visit with us. Thank you!             Your Updated  Medication List - Protect others around you: Learn how to safely use, store and throw away your medicines at www.disposemymeds.org.          This list is accurate as of 2/2/18 11:18 AM.  Always use your most recent med list.                   Brand Name Dispense Instructions for use Diagnosis    atenolol 100 MG tablet    TENORMIN    90 tablet    TAKE 1 TABLET (100 MG) BY MOUTH DAILY    Essential hypertension with goal blood pressure less than 140/90       budesonide-formoterol 160-4.5 MCG/ACT Inhaler    SYMBICORT    3 Inhaler    Inhale 2 puffs into the lungs 2 times daily as needed    Simple chronic bronchitis (H)       cyanocobalamin 2500 MCG sublingual tablet    VITAMIN B-12    30 tablet    Place 2,500 mcg under the tongue daily    Vitamin B12 deficiency (non anemic), Malignant neoplasm of urinary bladder, unspecified site (H)       hydrochlorothiazide 12.5 MG Tabs tablet     90 tablet    Take 1 tablet (12.5 mg) by mouth daily    Hypertension goal BP (blood pressure) < 140/90       levofloxacin 500 MG tablet    LEVAQUIN    10 tablet    Take 1 tablet (500 mg) by mouth daily    Epididymitis       lisinopril 20 MG tablet    PRINIVIL/ZESTRIL    135 tablet    Take 1.5 tablets (30 mg) by mouth daily    Hypertension goal BP (blood pressure) < 140/90       LORazepam 1 MG tablet    ATIVAN    30 tablet    Take 0.5 tablets (0.5 mg) by mouth nightly as needed for anxiety    Anxiety

## 2018-02-02 NOTE — LETTER
2/2/2018         RE: Casey Benitez  [facility] [address] [facility] [address]        Dear Colleague,    Thank you for referring your patient, Casey Benitez, to the [facility]. Please see a copy of my visit note below.    Medical Assistant Note:  Casey Benitez presents today for Blood Draw.    Patient seen by provider today: No.   present during visit today: Not Applicable.    Concerns: No Concerns.    Procedure:  Labs drawn: Yes.    Post Assessment:  Labs drawn without difficulty: Yes.    Discharge Plan:  Patient discharged in stable condition accompanied by: self.    Face to Face Time: 10 mins.    Bettie Ruffin              Again, thank you for allowing me to participate in the care of your patient.        Sincerely,        [facility] Oncology Nurse

## 2018-02-05 ENCOUNTER — HOSPITAL ENCOUNTER (OUTPATIENT)
Facility: CLINIC | Age: 53
Setting detail: SPECIMEN
End: 2018-02-05
Attending: INTERNAL MEDICINE
Payer: COMMERCIAL

## 2018-02-05 ENCOUNTER — ONCOLOGY VISIT (OUTPATIENT)
Dept: ONCOLOGY | Facility: CLINIC | Age: 53
End: 2018-02-05
Attending: INTERNAL MEDICINE
Payer: COMMERCIAL

## 2018-02-05 VITALS
BODY MASS INDEX: 29.54 KG/M2 | HEART RATE: 50 BPM | TEMPERATURE: 97 F | SYSTOLIC BLOOD PRESSURE: 145 MMHG | DIASTOLIC BLOOD PRESSURE: 81 MMHG | HEIGHT: 71 IN | WEIGHT: 211 LBS | OXYGEN SATURATION: 100 % | RESPIRATION RATE: 16 BRPM

## 2018-02-05 DIAGNOSIS — C67.0 MALIGNANT NEOPLASM OF TRIGONE OF URINARY BLADDER (H): Primary | ICD-10-CM

## 2018-02-05 LAB — COPATH REPORT: NORMAL

## 2018-02-05 PROCEDURE — G0463 HOSPITAL OUTPT CLINIC VISIT: HCPCS

## 2018-02-05 PROCEDURE — 99213 OFFICE O/P EST LOW 20 MIN: CPT | Performed by: INTERNAL MEDICINE

## 2018-02-05 ASSESSMENT — PAIN SCALES - GENERAL: PAINLEVEL: NO PAIN (0)

## 2018-02-05 NOTE — LETTER
2/5/2018         RE: Casey Benitez  [address] [place] [id number]        Dear Colleague,    Thank you for referring your patient, Casey Benitez, to the [facility]. Please see a copy of my visit note below.    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  He completed cycle 4 on 5/28/2013. He had some treatment delays due to thrombocytopenia.  He underwent surgery on 7/30 final pathology revealed a T3N0MX tumor. His CT showed a paratrachael node (left at 1.1 cm), concern for metastatic disease, therefore a PET was obtained which did not show this node being metabolically active.    SUBJECTIVE   Patient comes in for a follow up today. He is being treated for epididymitis. He thinks his symptoms are improving with antibiotics.   HEMATOLOGY ONCOLOGY HISTORY   In April of 2012, Casey noticed some blood in his urine which was confirmed by a UA. He was treated for urinary tract infection and failed to followup until November of this year he saw Dr. Zee.  Due to persistent painless hematuria, cystoscopy was obtained which revealed a large bladder tumor. Final pathology revealed a grade 3 PT2 muscle invasive transitional cell carcinoma of the bladder. During his initial visit, we discussed starting treatment in the neoadjuvant fashion with cisplatin and gemcitabine and the rationale behind the drugs and treatment.  Casey had his mediport placed. We obtained metastatic workup with CXR and a bone scan (rib pain) which did not reveal any evidence of metastatic disease  Casey started Cisplatin and Gemcitabine on 1/15/2013.  He completed cycle 4 on 5/28/2013. He had some treatment delays due to thrombocytopenia.  He feels like his Energy  "has improved. He underwent surgery on 7/30 final pathology revealed a T3N0MX tumor. He is feeling well after the surgery   His CT showed a paratrachael node (left at 1.1 cm), concern for metastatic disease, therefore a PET was obtained which did not show this node being metabolically active     REVIEW OF SYSTEMS   As above in the HPI, o/w a complete ROS was negative.    Past medical, social histories reviewed.    Meds- Reviewed.     PHYSICAL EXAM   /81  Pulse 50  Temp 97  F (36.1  C) (Tympanic)  Resp 16  Ht 1.797 m (5' 10.75\")  Wt 95.7 kg (211 lb)  SpO2 100%  BMI 29.64 kg/m2  CONSTITUTIONAL: Sitting comfortably.   HEENT: Pupils are equal. Oropharynx is clear.   NECK: No cervical or supraclavicular lymphadenopathy.   RESPIRATORY: Clear bilaterally.   CARD/VASC: S1, S2, regular.   GI: Soft, nontender, nondistended, no hepatosplenomegaly.   MUSKULOSKELETAL: Warm, well perfused.   NEUROLOGIC: Alert, awake.   INTEGUMENT: No rash.   LYMPHATICS: No edema.   PSYCH: Mood and affect was normal.    LABORATORY DATA AND IMAGING REVIEWED DURING THIS VISIT:  Recent Labs   Lab Test  02/02/18   1115  07/31/17   1010   05/06/13   0840   04/01/13   0935   NA  137  136   < >  137   < >  138   POTASSIUM  4.0  4.3   < >  3.8   < >  4.1   CHLORIDE  103  104   < >  96   < >  100   CO2  29  25   < >  27   < >  30   ANIONGAP  5  7   < >  13.6   < >  9   BUN  13  12   < >  8   < >  9   CR  0.84  0.84   < >  0.82   < >  0.83   GLC  103*  139*   < >  173*   < >  96   KIRBY  9.2  8.8   < >  9.1   < >  9.2   MAG   --    --    --   1.7   --   2.0    < > = values in this interval not displayed.     Recent Labs   Lab Test  02/02/18   1115  07/31/17   1010  02/01/17   1056   WBC  9.0  8.6  11.7*   HGB  14.1  13.7  13.4   PLT  177  178  184   MCV  98  96  99   NEUTROPHIL  47.2  49.7  61.1     Recent Labs   Lab Test  02/02/18   1115  07/31/17   1010  02/01/17   1056   BILITOTAL  0.5  0.6  1.1   ALKPHOS  77  70  82   ALT  44  44  39   AST  31 "  40  32   ALBUMIN  4.0  3.8  3.7         Results for orders placed or performed during the hospital encounter of 02/02/18   CT Chest/Abdomen/Pelvis w Contrast    Narrative    CT CHEST/ABDOMEN/PELVIS WITH CONTRAST   2/2/2018 12:05 PM     HISTORY: Bladder cancer follow-up.    TECHNIQUE: 100 mL Isovue-370 IV were administered. After contrast  administration, volumetric helical sections were acquired from the  thoracic inlet to the ischial tuberosities. Coronal images were also  reconstructed. Radiation dose for this scan was reduced using  automated exposure control, adjustment of the mA and/or kV according  to patient size, or iterative reconstruction technique.    COMPARISON: CT of the chest, abdomen, and pelvis performed 2/1/2017.    FINDINGS:    Chest: Few small calcified granulomas are again noted in the right  upper lobe of the lung. A 0.5 cm pulmonary nodule along the right  minor fissure (series 3 image 32) is unchanged. A few tiny additional  indeterminate pulmonary nodules in both lungs are also unchanged. No  new or enlarging pulmonary nodules are seen. No pleural or pericardial  effusions. No enlarged lymph nodes are identified in the chest.  Incidentally noted aberrant right subclavian artery. Some sclerosis  along the inferior portion of the T11, T12, and L1 vertebral bodies is  unchanged, and could be degenerative in etiology.    Abdomen and Pelvis: Scattered calcified granulomas in the spleen.  Nodularity of the liver surface raises the possibility of cirrhosis.  No focal hepatic lesions are seen. Minimal prominence of the  intrarenal collecting systems bilaterally, improved since the previous  exam. The gallbladder, spleen, adrenal glands, pancreas, and kidneys  are otherwise unremarkable. Mild atherosclerotic aortoiliac  calcification. Postoperative changes of prior cystectomy and right  lower quadrant ileal conduit. No bowel obstruction. Unremarkable  appendix. No free fluid in the pelvis. No  enlarged lymph nodes are  identified in the abdomen and pelvis. Scattered mildly prominent  retroperitoneal, gastrohepatic ligament, and tammy hepatis lymph nodes  are unchanged.       Impression    IMPRESSION:   1. A few small indeterminate pulmonary nodules in both lungs are  unchanged.  2. Previously noted bilateral hydronephrosis has improved.  3. Mild nodularity of the liver surface raises the possibility of  underlying cirrhosis. Please clinically correlate.    YINA CISNEROS MD       ECOG PS: 1     ASSESSMENT   Mr. Benitez is a pleasant 53-year-old male with diagnosis of transitional cell carcinoma of the bladder at least T2 NXMX s/p complete cystectomy with ileal diversion.  CT scan results 1/12/16 showed no evidence of recurrence.  Switched to yearly CT scan 1/2016.  [facility] to [facility]     - Labs were reviewed with the patient- stable. Urine cytology is pending.   - He is on B12 supplementation.   - CT scan 2/2/2018 results were reviewed with the patient- without any evidence of disease  - I advised him to continue the treatment for epididymis and schedule a follow up with urology as well.     PLAN   RTC MD 1 year  Labs before next visit- CBC diff, CMP, urine cytology and B12  CT scan chest abdomen and pelvis with contrast before next visit.   Schedule a follow up with Dr. Tucker Mccarty MD  2/5/2018    CC Von Ceballos MD    Again, thank you for allowing me to participate in the care of your patient.        Sincerely,        Constanza Mccarty MD

## 2018-02-05 NOTE — NURSING NOTE
"Oncology Rooming Note    February 5, 2018 1:38 PM   Casey Benitez is a 53 year old male who presents for:    Chief Complaint   Patient presents with     Oncology Clinic Visit     Follow up bladder cancer     Initial Vitals: /81  Pulse 50  Temp 97  F (36.1  C) (Tympanic)  Resp 16  Ht 1.797 m (5' 10.75\")  Wt 95.7 kg (211 lb)  SpO2 100%  BMI 29.64 kg/m2 Estimated body mass index is 29.64 kg/(m^2) as calculated from the following:    Height as of this encounter: 1.797 m (5' 10.75\").    Weight as of this encounter: 95.7 kg (211 lb). Body surface area is 2.19 meters squared.  No Pain (0) Comment: Data Unavailable   No LMP for male patient.  Allergies reviewed: Yes  Medications reviewed: Yes    Medications: Medication refills not needed today.  Pharmacy name entered into [employer]:    [facility] [address] [place] [facility] [address] [facility] [address]    Clinical concerns: follow up     8 minutes for nursing intake (face to face time)     Diana Stover CMA     DISCHARGE PLAN:  Next appointments: See patient instruction section  Departure Mode: Ambulatory  Accompanied by: self  0 minutes for nursing discharge (face to face time)   Diana Stover CMA                  "

## 2018-02-05 NOTE — MR AVS SNAPSHOT
After Visit Summary   2/5/2018    Casey Benitez    MRN: [id number]           Patient Information     Date Of Birth          1965        Visit Information        Provider Department      2/5/2018 1:30 PM Constanza Mccarty MD [facility] On[facility]      Today's Diagnoses     Malignant neoplasm of trigone of urinary bladder (H)    -  1      Care Instructions        RTC MD 1 year scheduled Jess Boateng  Labs before next visit- CBC diff, CMP, urine cytology and B12 scheduled Jess Boateng    CT scan chest abdomen and pelvis with contrast before next visit. Jess Boateng    Schedule a follow up with Dr. Ceballos patient said he will call to schedule          Follow-ups after your visit        Your next 10 appointments already scheduled     Feb 04, 2019 10:30 AM CST   Return Visit with  ONCOLOGY NURSE   [facility] ([facility])    [facility]  [address]  [facility] [address]   [phone]            Feb 04, 2019 11:00 AM CST   (Arrive by 10:45 AM)   CT CHEST/ABDOMEN/PELVIS W CONTRAST with [address] [facility] ([facility])    [address] [facility] [address]  [facility] [address]   [phone]           Please bring any scans or X-rays taken at other hospitals, if similar tests were done. Also bring a list of your medicines, including vitamins, minerals and over-the-counter drugs. It is safest to leave personal items at home.  Be sure to tell your doctor:   If you have any allergies.   If there s any chance you are pregnant.   If you are breastfeeding.   If you have any special needs.  You may have contrast for this exam. To prepare:   Do not eat or drink for 2 hours before your exam. If you need to take medicine, you may take it with small sips of water. (We may ask you to take liquid medicine as well.)   The day before your exam, drink extra  fluids at least six 8-ounce glasses (unless your doctor tells you to restrict your fluids).  Patients over 70 or patients with diabetes or kidney problems:   If you haven t had a blood test (creatinine test) within the last 30 days, go to your clinic or Diagnostic Imaging Department for this test.  If you have diabetes:   If your kidney function is normal, continue taking your metformin (Avandamet, Glucophage, Glucovance, Metaglip) on the day of your exam.   If your kidney function is abnormal, wait 48 hours before restarting this medicine.  You will have oral contrast for this exam:   You will drink the contrast at home. Get this from your clinic or Diagnostic Imaging Department. Please follow the directions given.  Please wear loose clothing, such as a sweat suit or jogging clothes. Avoid snaps, zippers and other metal. We may ask you to undress and put on a hospital gown.  If you have any questions, please call the Imaging Department where you will have your exam.              Future tests that were ordered for you today     Open Future Orders        Priority Expected Expires Ordered    CBC with platelets differential Routine  2/5/2019 2/5/2018    Comprehensive metabolic panel Routine  2/5/2019 2/5/2018    Cytology non gyn Routine  2/5/2019 2/5/2018    CT Chest/Abdomen/Pelvis w Contrast Routine  2/5/2019 2/5/2018            Who to contact     If you have questions or need follow up information about today's clinic visit or your schedule please contact [facility]ARE directly at [phone].  Normal or non-critical lab and imaging results will be communicated to you by MyChart, letter or phone within 4 business days after the clinic has received the results. If you do not hear from us within 7 days, please contact the clinic through [employer] or phone. If you have a critical or abnormal lab result, we will notify you by phone as soon as possible.  Submit refill requests through [employer] or call  "your pharmacy and they will forward the refill request to us. Please allow 3 business days for your refill to be completed.          Additional Information About Your Visit        [employer]ed[employer] Information     [employer] gives you secure access to your electronic health record. If you see a primary care provider, you can also send messages to your care team and make appointments. If you have questions, please call your primary care clinic.  If you do not have a primary care provider, please call [phone] and they will assist you.        Care EveryWhere ID     This is your Care EveryWhere ID. This could be used by other organizations to access your [place] medical records  [id number]        Your Vitals Were     Pulse Temperature Respirations Height Pulse Oximetry BMI (Body Mass Index)    50 97  F (36.1  C) (Tympanic) 16 1.797 m (5' 10.75\") 100% 29.64 kg/m2       Blood Pressure from Last 3 Encounters:   02/05/18 145/81   01/29/18 139/80   08/07/17 131/80    Weight from Last 3 Encounters:   02/05/18 95.7 kg (211 lb)   01/29/18 95.1 kg (209 lb 11.2 oz)   08/07/17 97.2 kg (214 lb 3.2 oz)                 Today's Medication Changes          These changes are accurate as of 2/5/18  2:11 PM.  If you have any questions, ask your nurse or doctor.               These medicines have changed or have updated prescriptions.        Dose/Directions    lisinopril 20 MG tablet   Commonly known as:  PRINIVIL/ZESTRIL   This may have changed:  how much to take   Used for:  Hypertension goal BP (blood pressure) < 140/90        Dose:  30 mg   Take 1.5 tablets (30 mg) by mouth daily   Quantity:  135 tablet   Refills:  3                Primary Care Provider Office Phone # Fax #    Ramona Ann Aaseby-Aguilera, PA-C [phone] [address]  [facility] [address]        Equal Access to Services     LORETTA TURNER AH: Hadii alan padillao Soomaali, waaxda luqadaha, qaybta kaalmaelie villaseñor " ah. So [facility] [phone].    ATENCIÓN: Si raila blue, tiene a braxton disposición servicios gratuitos de asistencia lingüística. Kelechi schroeder [phone].    We comply with applicable federal civil rights laws and [place] laws. We do not discriminate on the basis of race, color, national origin, age, disability, sex, sexual orientation, or gender identity.            Thank you!     Thank you for choosing [facility]  for your care. Our goal is always to provide you with excellent care. Hearing back from our patients is one way we can continue to improve our services. Please take a few minutes to complete the written survey that you may receive in the mail after your visit with us. Thank you!             Your Updated Medication List - Protect others around you: Learn how to safely use, store and throw away your medicines at www.disposemymeds.org.          This list is accurate as of 2/5/18  2:11 PM.  Always use your most recent med list.                   Brand Name Dispense Instructions for use Diagnosis    atenolol 100 MG tablet    TENORMIN    90 tablet    TAKE 1 TABLET (100 MG) BY MOUTH DAILY    Essential hypertension with goal blood pressure less than 140/90       budesonide-formoterol 160-4.5 MCG/ACT Inhaler    SYMBICORT    3 Inhaler    Inhale 2 puffs into the lungs 2 times daily as needed    Simple chronic bronchitis (H)       cyanocobalamin 2500 MCG sublingual tablet    VITAMIN B-12    30 tablet    Place 2,500 mcg under the tongue daily    Vitamin B12 deficiency (non anemic), Malignant neoplasm of urinary bladder, unspecified site (H)       hydrochlorothiazide 12.5 MG Tabs tablet     90 tablet    Take 1 tablet (12.5 mg) by mouth daily    Hypertension goal BP (blood pressure) < 140/90       levofloxacin 500 MG tablet    LEVAQUIN    10 tablet    Take 1 tablet (500 mg) by mouth daily    Epididymitis       lisinopril 20 MG tablet    PRINIVIL/ZESTRIL    135 tablet    Take 1.5 tablets (30 mg) by  mouth daily    Hypertension goal BP (blood pressure) < 140/90       LORazepam 1 MG tablet    ATIVAN    30 tablet    Take 0.5 tablets (0.5 mg) by mouth nightly as needed for anxiety    Anxiety

## 2018-02-05 NOTE — PROGRESS NOTES
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  He completed cycle 4 on 5/28/2013. He had some treatment delays due to thrombocytopenia.  He underwent surgery on 7/30 final pathology revealed a T3N0MX tumor. His CT showed a paratrachael node (left at 1.1 cm), concern for metastatic disease, therefore a PET was obtained which did not show this node being metabolically active.    SUBJECTIVE   Patient comes in for a follow up today. He is being treated for epididymitis. He thinks his symptoms are improving with antibiotics.   HEMATOLOGY ONCOLOGY HISTORY   In April of 2012, Casey noticed some blood in his urine which was confirmed by a UA. He was treated for urinary tract infection and failed to followup until November of this year he saw Dr. Zee.  Due to persistent painless hematuria, cystoscopy was obtained which revealed a large bladder tumor. Final pathology revealed a grade 3 PT2 muscle invasive transitional cell carcinoma of the bladder. During his initial visit, we discussed starting treatment in the neoadjuvant fashion with cisplatin and gemcitabine and the rationale behind the drugs and treatment.  Casey had his mediport placed. We obtained metastatic workup with CXR and a bone scan (rib pain) which did not reveal any evidence of metastatic disease  Casey started Cisplatin and Gemcitabine on 1/15/2013.  He completed cycle 4 on 5/28/2013. He had some treatment delays due to thrombocytopenia.  He feels like his Energy has improved. He underwent surgery on 7/30 final pathology revealed a T3N0MX tumor. He is feeling well after the surgery   His CT showed a paratrachael node (left at 1.1 cm), concern for metastatic disease, therefore a PET was obtained which did  "not show this node being metabolically active     REVIEW OF SYSTEMS   As above in the HPI, o/w a complete ROS was negative.    Past medical, social histories reviewed.    Meds- Reviewed.     PHYSICAL EXAM   /81  Pulse 50  Temp 97  F (36.1  C) (Tympanic)  Resp 16  Ht 1.797 m (5' 10.75\")  Wt 95.7 kg (211 lb)  SpO2 100%  BMI 29.64 kg/m2  CONSTITUTIONAL: Sitting comfortably.   HEENT: Pupils are equal. Oropharynx is clear.   NECK: No cervical or supraclavicular lymphadenopathy.   RESPIRATORY: Clear bilaterally.   CARD/VASC: S1, S2, regular.   GI: Soft, nontender, nondistended, no hepatosplenomegaly.   MUSKULOSKELETAL: Warm, well perfused.   NEUROLOGIC: Alert, awake.   INTEGUMENT: No rash.   LYMPHATICS: No edema.   PSYCH: Mood and affect was normal.    LABORATORY DATA AND IMAGING REVIEWED DURING THIS VISIT:  Recent Labs   Lab Test  02/02/18 1115 07/31/17   1010   05/06/13   0840   04/01/13   0935   NA  137  136   < >  137   < >  138   POTASSIUM  4.0  4.3   < >  3.8   < >  4.1   CHLORIDE  103  104   < >  96   < >  100   CO2  29  25   < >  27   < >  30   ANIONGAP  5  7   < >  13.6   < >  9   BUN  13  12   < >  8   < >  9   CR  0.84  0.84   < >  0.82   < >  0.83   GLC  103*  139*   < >  173*   < >  96   KIRBY  9.2  8.8   < >  9.1   < >  9.2   MAG   --    --    --   1.7   --   2.0    < > = values in this interval not displayed.     Recent Labs   Lab Test  02/02/18 1115 07/31/17   1010  02/01/17   1056   WBC  9.0  8.6  11.7*   HGB  14.1  13.7  13.4   PLT  177  178  184   MCV  98  96  99   NEUTROPHIL  47.2  49.7  61.1     Recent Labs   Lab Test  02/02/18 1115 07/31/17   1010  02/01/17   1056   BILITOTAL  0.5  0.6  1.1   ALKPHOS  77  70  82   ALT  44  44  39   AST  31  40  32   ALBUMIN  4.0  3.8  3.7         Results for orders placed or performed during the hospital encounter of 02/02/18   CT Chest/Abdomen/Pelvis w Contrast    Narrative    CT CHEST/ABDOMEN/PELVIS WITH CONTRAST   2/2/2018 12:05 PM     HISTORY: " Bladder cancer follow-up.    TECHNIQUE: 100 mL Isovue-370 IV were administered. After contrast  administration, volumetric helical sections were acquired from the  thoracic inlet to the ischial tuberosities. Coronal images were also  reconstructed. Radiation dose for this scan was reduced using  automated exposure control, adjustment of the mA and/or kV according  to patient size, or iterative reconstruction technique.    COMPARISON: CT of the chest, abdomen, and pelvis performed 2/1/2017.    FINDINGS:    Chest: Few small calcified granulomas are again noted in the right  upper lobe of the lung. A 0.5 cm pulmonary nodule along the right  minor fissure (series 3 image 32) is unchanged. A few tiny additional  indeterminate pulmonary nodules in both lungs are also unchanged. No  new or enlarging pulmonary nodules are seen. No pleural or pericardial  effusions. No enlarged lymph nodes are identified in the chest.  Incidentally noted aberrant right subclavian artery. Some sclerosis  along the inferior portion of the T11, T12, and L1 vertebral bodies is  unchanged, and could be degenerative in etiology.    Abdomen and Pelvis: Scattered calcified granulomas in the spleen.  Nodularity of the liver surface raises the possibility of cirrhosis.  No focal hepatic lesions are seen. Minimal prominence of the  intrarenal collecting systems bilaterally, improved since the previous  exam. The gallbladder, spleen, adrenal glands, pancreas, and kidneys  are otherwise unremarkable. Mild atherosclerotic aortoiliac  calcification. Postoperative changes of prior cystectomy and right  lower quadrant ileal conduit. No bowel obstruction. Unremarkable  appendix. No free fluid in the pelvis. No enlarged lymph nodes are  identified in the abdomen and pelvis. Scattered mildly prominent  retroperitoneal, gastrohepatic ligament, and tammy hepatis lymph nodes  are unchanged.       Impression    IMPRESSION:   1. A few small indeterminate pulmonary  nodules in both lungs are  unchanged.  2. Previously noted bilateral hydronephrosis has improved.  3. Mild nodularity of the liver surface raises the possibility of  underlying cirrhosis. Please clinically correlate.    YINA CISNEROS MD       ECOG PS: 1     ASSESSMENT   Mr. Benitez is a pleasant 53-year-old male with diagnosis of transitional cell carcinoma of the bladder at least T2 NXMX s/p complete cystectomy with ileal diversion.  CT scan results 1/12/16 showed no evidence of recurrence.  Switched to yearly CT scan 1/2016.  He continues to [facility]     - Labs were reviewed with the patient- stable. Urine cytology is pending.   - He is on B12 supplementation.   - CT scan 2/2/2018 results were reviewed with the patient- without any evidence of disease  - I advised him to continue the treatment for epididymis and schedule a follow up with urology as well.     PLAN   RTC MD 1 year  Labs before next visit- CBC diff, CMP, urine cytology and B12  CT scan chest abdomen and pelvis with contrast before next visit.   Schedule a follow up with Dr. Tucker Mccarty MD  2/5/2018    CC Von Ceballos MD

## 2018-02-05 NOTE — PATIENT INSTRUCTIONS
RTC MD 1 year scheduled Jess Boateng  Labs before next visit- CBC diff, CMP, urine cytology and B12 scheduled Jess Boateng 2/4/19 STEF Quach,RN    CT scan chest abdomen and pelvis with contrast before next visit. Jess Boateng 2/4/19 STEF Quach,RN    Schedule a follow up with Dr. Ceballos patient said he will call to schedule not scheduled to date 2/12/18  Carlos Quach RN, BSN, OCN  [facility]  Patient Care Coordinator

## 2018-04-17 DIAGNOSIS — I10 HYPERTENSION GOAL BP (BLOOD PRESSURE) < 140/90: ICD-10-CM

## 2018-04-17 RX ORDER — HYDROCHLOROTHIAZIDE 12.5 MG/1
TABLET ORAL
Qty: 90 TABLET | Refills: 0 | Status: SHIPPED | OUTPATIENT
Start: 2018-04-17 | End: 2019-01-07

## 2018-04-17 RX ORDER — LISINOPRIL 20 MG/1
TABLET ORAL
Qty: 135 TABLET | Refills: 0 | Status: SHIPPED | OUTPATIENT
Start: 2018-04-17 | End: 2019-01-07

## 2018-04-17 NOTE — TELEPHONE ENCOUNTER
Prescription approved per [facility] Refill Protocol.  BP was elevated with oncology appt but otherwise at goal    Maria Esther Casillas RN

## 2018-04-17 NOTE — TELEPHONE ENCOUNTER
"Requested Prescriptions   Pending Prescriptions Disp Refills     lisinopril (PRINIVIL/ZESTRIL) 20 MG tablet [Pharmacy Med Name: LISINOPRIL 20 MG TABLET] 135 tablet 2    Last Written Prescription Date:  03/31/2017  Last Fill Quantity: 135 TABLET,  # refills: 3   Last office visit: 1/29/2018 with prescribing provider:  01/29/2018   Future Office Visit:     Sig: TAKE 1.5 TABLETS (30 MG) BY MOUTH DAILY    ACE Inhibitors (Including Combos) Protocol Failed    4/17/2018  1:20 AM       Failed - Blood pressure under 140/90 in past 12 months    BP Readings from Last 3 Encounters:   02/05/18 145/81   01/29/18 139/80   08/07/17 131/80                Passed - Recent (12 mo) or future (30 days) visit within the authorizing provider's specialty    Patient had office visit in the last 12 months or has a visit in the next 30 days with authorizing provider or within the authorizing provider's specialty.  See \"Patient Info\" tab in inbasket, or \"Choose Columns\" in Meds & Orders section of the refill encounter.           Passed - Patient is age 18 or older       Passed - Normal serum creatinine on file in past 12 months    Recent Labs   Lab Test  02/02/18   1115   CR  0.84            Passed - Normal serum potassium on file in past 12 months    Recent Labs   Lab Test  02/02/18   1115   POTASSIUM  4.0                   hydrochlorothiazide 12.5 MG TABS tablet [Pharmacy Med Name: HYDROCHLOROTHIAZIDE 12.5 MG TB] 90 tablet 2    Last Written Prescription Date:  03/31/2017  Last Fill Quantity: 90 TABLET,  # refills: 3   Last office visit: 1/29/2018 with prescribing provider:  01/29/2018   Future Office Visit:     Sig: TAKE 1 TABLET (12.5 MG) BY MOUTH DAILY    Diuretics (Including Combos) Protocol Failed    4/17/2018  1:20 AM       Failed - Blood pressure under 140/90 in past 12 months    BP Readings from Last 3 Encounters:   02/05/18 145/81   01/29/18 139/80   08/07/17 131/80                Passed - Recent (12 mo) or future (30 days) visit " "within the authorizing provider's specialty    Patient had office visit in the last 12 months or has a visit in the next 30 days with authorizing provider or within the authorizing provider's specialty.  See \"Patient Info\" tab in inbasket, or \"Choose Columns\" in Meds & Orders section of the refill encounter.           Passed - Patient is age 18 or older       Passed - Normal serum creatinine on file in past 12 months    Recent Labs   Lab Test  02/02/18   1115   CR  0.84             Passed - Normal serum potassium on file in past 12 months    Recent Labs   Lab Test  02/02/18   1115   POTASSIUM  4.0                   Passed - Normal serum sodium on file in past 12 months    Recent Labs   Lab Test  02/02/18   1115   NA  137                Ludwin OSEGUERAT  "

## 2018-09-26 ENCOUNTER — OFFICE VISIT (OUTPATIENT)
Dept: URGENT CARE | Facility: URGENT CARE | Age: 53
End: 2018-09-26
Payer: COMMERCIAL

## 2018-09-26 VITALS
OXYGEN SATURATION: 97 % | HEART RATE: 51 BPM | DIASTOLIC BLOOD PRESSURE: 70 MMHG | TEMPERATURE: 97.9 F | SYSTOLIC BLOOD PRESSURE: 130 MMHG

## 2018-09-26 DIAGNOSIS — N39.0 URINARY TRACT INFECTION WITHOUT HEMATURIA, SITE UNSPECIFIED: Primary | ICD-10-CM

## 2018-09-26 DIAGNOSIS — R82.90 NONSPECIFIC FINDING ON EXAMINATION OF URINE: ICD-10-CM

## 2018-09-26 DIAGNOSIS — R10.31 RLQ ABDOMINAL PAIN: ICD-10-CM

## 2018-09-26 LAB
ALBUMIN UR-MCNC: NEGATIVE MG/DL
APPEARANCE UR: ABNORMAL
BACTERIA #/AREA URNS HPF: ABNORMAL /HPF
BASOPHILS # BLD AUTO: 0 10E9/L (ref 0–0.2)
BASOPHILS NFR BLD AUTO: 0.3 %
BILIRUB UR QL STRIP: NEGATIVE
COLOR UR AUTO: YELLOW
DIFFERENTIAL METHOD BLD: ABNORMAL
EOSINOPHIL # BLD AUTO: 0.2 10E9/L (ref 0–0.7)
EOSINOPHIL NFR BLD AUTO: 2.1 %
ERYTHROCYTE [DISTWIDTH] IN BLOOD BY AUTOMATED COUNT: 11.6 % (ref 10–15)
GLUCOSE UR STRIP-MCNC: NEGATIVE MG/DL
HCT VFR BLD AUTO: 39.9 % (ref 40–53)
HGB BLD-MCNC: 13.8 G/DL (ref 13.3–17.7)
HGB UR QL STRIP: ABNORMAL
KETONES UR STRIP-MCNC: NEGATIVE MG/DL
LEUKOCYTE ESTERASE UR QL STRIP: NEGATIVE
LYMPHOCYTES # BLD AUTO: 3.3 10E9/L (ref 0.8–5.3)
LYMPHOCYTES NFR BLD AUTO: 35.8 %
MCH RBC QN AUTO: 32.6 PG (ref 26.5–33)
MCHC RBC AUTO-ENTMCNC: 34.6 G/DL (ref 31.5–36.5)
MCV RBC AUTO: 94 FL (ref 78–100)
MONOCYTES # BLD AUTO: 0.8 10E9/L (ref 0–1.3)
MONOCYTES NFR BLD AUTO: 8.5 %
NEUTROPHILS # BLD AUTO: 4.9 10E9/L (ref 1.6–8.3)
NEUTROPHILS NFR BLD AUTO: 53.3 %
NITRATE UR QL: NEGATIVE
PH UR STRIP: 7 PH (ref 5–7)
PLATELET # BLD AUTO: 194 10E9/L (ref 150–450)
RBC # BLD AUTO: 4.23 10E12/L (ref 4.4–5.9)
RBC #/AREA URNS AUTO: ABNORMAL /HPF
SOURCE: ABNORMAL
SP GR UR STRIP: 1.02 (ref 1–1.03)
UROBILINOGEN UR STRIP-ACNC: 0.2 EU/DL (ref 0.2–1)
WBC # BLD AUTO: 9.2 10E9/L (ref 4–11)
WBC #/AREA URNS AUTO: ABNORMAL /HPF

## 2018-09-26 PROCEDURE — 81001 URINALYSIS AUTO W/SCOPE: CPT | Performed by: PHYSICIAN ASSISTANT

## 2018-09-26 PROCEDURE — 87186 SC STD MICRODIL/AGAR DIL: CPT | Performed by: PHYSICIAN ASSISTANT

## 2018-09-26 PROCEDURE — 36415 COLL VENOUS BLD VENIPUNCTURE: CPT | Performed by: PHYSICIAN ASSISTANT

## 2018-09-26 PROCEDURE — 87088 URINE BACTERIA CULTURE: CPT | Performed by: PHYSICIAN ASSISTANT

## 2018-09-26 PROCEDURE — 85025 COMPLETE CBC W/AUTO DIFF WBC: CPT | Performed by: PHYSICIAN ASSISTANT

## 2018-09-26 PROCEDURE — 87086 URINE CULTURE/COLONY COUNT: CPT | Performed by: PHYSICIAN ASSISTANT

## 2018-09-26 PROCEDURE — 99214 OFFICE O/P EST MOD 30 MIN: CPT | Performed by: PHYSICIAN ASSISTANT

## 2018-09-26 RX ORDER — CEFDINIR 300 MG/1
300 CAPSULE ORAL 2 TIMES DAILY
Qty: 14 CAPSULE | Refills: 0 | Status: SHIPPED | OUTPATIENT
Start: 2018-09-26 | End: 2018-10-03

## 2018-09-26 NOTE — PROGRESS NOTES
SUBJECTIVE:   Casey Benitez is a 53 year old male presenting with a chief complaint of   Chief Complaint   Patient presents with     Urgent Care     Abdominal Pain     Lower Rt Abdominal pain x1 week- hurts when pushing pressure, started only bending over. Pt has abdominal stoma.       He is an established patient of [facility].    Abdominal Pain    Location: RLQ and right pelvic area   Radiation: None.    Pain character: dull,   Severity: 4 on a scale of 1-10.    Duration: 1 week(s)   Course of Illness: slowly progressive.  Exacerbated by: nothing  Relieved by: nothing.  Associated Symptoms: strong urine odor in the past couple days. No dysuria or urinary frequency.  He denies any f/c/n/v/d. He has a colostomy bag. Output has been normal.        Review of Systems   Constitutional: Negative for chills and fever.   Gastrointestinal: Positive for abdominal pain (right lower quadrant). Negative for diarrhea, nausea and vomiting.   Genitourinary: Negative for dysuria, flank pain, frequency and hematuria.       Past Medical History:   Diagnosis Date     Bladder cancer (H)      COPD (chronic obstructive pulmonary disease) (H) 9/16/2016     Elevated LFTs      Gastro-oesophageal reflux disease     heartburn     GERD (gastroesophageal reflux disease)      Hyperlipidemia LDL goal < 100      Hypertension      Hypertension goal BP (blood pressure) < 140/90      Impaired fasting glucose      Liver disease     fatty liver disease     Tobacco abuse      Wheezes      Family History   Problem Relation Age of Onset     C.A.D. Father      CABG, LATE 50s     Family History Negative Father      Hypertension Father      Family History Negative Mother      Hypertension Maternal Grandfather      Hypertension Maternal Grandmother      Current Outpatient Prescriptions   Medication Sig Dispense Refill     atenolol (TENORMIN) 100 MG tablet TAKE 1 TABLET (100 MG) BY MOUTH DAILY 90 tablet 1     budesonide-formoterol (SYMBICORT) 160-4.5 MCG/ACT  Inhaler Inhale 2 puffs into the lungs 2 times daily as needed 3 Inhaler 2     cefdinir (OMNICEF) 300 MG capsule Take 1 capsule (300 mg) by mouth 2 times daily for 7 days 14 capsule 0     hydrochlorothiazide 12.5 MG TABS tablet TAKE 1 TABLET (12.5 MG) BY MOUTH DAILY 90 tablet 0     lisinopril (PRINIVIL/ZESTRIL) 20 MG tablet TAKE 1.5 TABLETS (30 MG) BY MOUTH DAILY 135 tablet 0     cyabnocobalamin (VITAMIN B-12) 2500 MCG sublingual tablet Place 2,500 mcg under the tongue daily (Patient not taking: Reported on 9/26/2018) 30 tablet 3     levofloxacin (LEVAQUIN) 500 MG tablet Take 1 tablet (500 mg) by mouth daily (Patient not taking: Reported on 9/26/2018) 10 tablet 0     LORazepam (ATIVAN) 1 MG tablet Take 0.5 tablets (0.5 mg) by mouth nightly as needed for anxiety (Patient not taking: Reported on 9/26/2018) 30 tablet 0     Social History   Substance Use Topics     Smoking status: Former Smoker     Packs/day: 0.50     Years: 20.00     Types: Cigarettes     Start date: 1/30/1992     Quit date: 12/14/2012     Smokeless tobacco: Never Used     Alcohol use No      Comment: stopped drinking        OBJECTIVE  /70 (BP Location: Right arm, Patient Position: Chair, Cuff Size: Adult Regular)  Pulse 51  Temp 97.9  F (36.6  C) (Oral)  SpO2 97%    Physical Exam   Constitutional: He appears well-developed and well-nourished. No distress.   HENT:   Head: Normocephalic and atraumatic.   Mouth/Throat: Oropharynx is clear and moist.   Eyes: Conjunctivae are normal.   Neck: Normal range of motion.   Cardiovascular: Regular rhythm and normal heart sounds.    Pulmonary/Chest: Effort normal and breath sounds normal. No respiratory distress.   Abdominal: Soft. Bowel sounds are normal. He exhibits no distension. There is no rebound and no guarding.   Ostomy bag noted. Mild tenderness to palpation right lower pelvic. Negative psoas sign.   Neurological: He is alert.   Skin: Skin is warm and dry. He is not diaphoretic.   Psychiatric: He  has a normal mood and affect.   Nursing note and vitals reviewed.      Labs:  Results for orders placed or performed in visit on 09/26/18 (from the past 24 hour(s))   CBC with platelets and differential   Result Value Ref Range    WBC 9.2 4.0 - 11.0 10e9/L    RBC Count 4.23 (L) 4.4 - 5.9 10e12/L    Hemoglobin 13.8 13.3 - 17.7 g/dL    Hematocrit 39.9 (L) 40.0 - 53.0 %    MCV 94 78 - 100 fl    MCH 32.6 26.5 - 33.0 pg    MCHC 34.6 31.5 - 36.5 g/dL    RDW 11.6 10.0 - 15.0 %    Platelet Count 194 150 - 450 10e9/L    Diff Method Automated Method     % Neutrophils 53.3 %    % Lymphocytes 35.8 %    % Monocytes 8.5 %    % Eosinophils 2.1 %    % Basophils 0.3 %    Absolute Neutrophil 4.9 1.6 - 8.3 10e9/L    Absolute Lymphocytes 3.3 0.8 - 5.3 10e9/L    Absolute Monocytes 0.8 0.0 - 1.3 10e9/L    Absolute Eosinophils 0.2 0.0 - 0.7 10e9/L    Absolute Basophils 0.0 0.0 - 0.2 10e9/L   UA reflex to Microscopic and Culture   Result Value Ref Range    Color Urine Yellow     Appearance Urine Cloudy     Glucose Urine Negative NEG^Negative mg/dL    Bilirubin Urine Negative NEG^Negative    Ketones Urine Negative NEG^Negative mg/dL    Specific Gravity Urine 1.020 1.003 - 1.035    Blood Urine Small (A) NEG^Negative    pH Urine 7.0 5.0 - 7.0 pH    Protein Albumin Urine Negative NEG^Negative mg/dL    Urobilinogen Urine 0.2 0.2 - 1.0 EU/dL    Nitrite Urine Negative NEG^Negative    Leukocyte Esterase Urine Negative NEG^Negative    Source Midstream Urine    Urine Microscopic   Result Value Ref Range    WBC Urine 10-25 (A) OTO5^0 - 5 /HPF    RBC Urine 5-10 (A) OTO2^O - 2 /HPF    Bacteria Urine Few (A) NEG^Negative /HPF           ASSESSMENT:      ICD-10-CM    1. Urinary tract infection without hematuria, site unspecified N39.0 cefdinir (OMNICEF) 300 MG capsule   2. RLQ abdominal pain R10.31 CBC with platelets and differential     UA reflex to Microscopic and Culture     Urine Microscopic     cefdinir (OMNICEF) 300 MG capsule     Urine Culture  Aerobic Bacterial   3. Nonspecific finding on examination of urine R82.90 Urine Culture Aerobic Bacterial        Medical Decision Making:    Differential Diagnosis:  Abdominal Pain: UTI, Kidney Stone and Non Specific etc....    Serious Comorbid Conditions:  Adult: Hx of bladder cancer    PLAN:    1-UTI Adult: CBC is stable. UA not clear today. Omnicef Rx. Urine culture is pending. Push fluids. We will communicate any changes to the antibiotic if need be based on the urine culture result. Follow up if any worsening symptoms. Patient agrees with the plan.     2- Right lower abdominal pain: CBC is stable. Suspect related to #1. Omnicef Rx. Follow up if any worsening symptoms. He agrees.    Followup:    If not improving or if condition worsens, follow up with your Primary Care Provider

## 2018-09-27 ASSESSMENT — ENCOUNTER SYMPTOMS
FREQUENCY: 0
NAUSEA: 0
FEVER: 0
DYSURIA: 0
HEMATURIA: 0
VOMITING: 0
CHILLS: 0
ABDOMINAL PAIN: 1
FLANK PAIN: 0
DIARRHEA: 0

## 2018-09-30 LAB
BACTERIA SPEC CULT: ABNORMAL
SPECIMEN SOURCE: ABNORMAL

## 2018-10-04 ENCOUNTER — OFFICE VISIT (OUTPATIENT)
Dept: FAMILY MEDICINE | Facility: CLINIC | Age: 53
End: 2018-10-04
Payer: COMMERCIAL

## 2018-10-04 VITALS
DIASTOLIC BLOOD PRESSURE: 70 MMHG | TEMPERATURE: 97.9 F | WEIGHT: 199.9 LBS | HEART RATE: 65 BPM | SYSTOLIC BLOOD PRESSURE: 123 MMHG | OXYGEN SATURATION: 97 % | HEIGHT: 71 IN | BODY MASS INDEX: 27.98 KG/M2

## 2018-10-04 DIAGNOSIS — Z23 ENCOUNTER FOR IMMUNIZATION: Primary | ICD-10-CM

## 2018-10-04 DIAGNOSIS — R10.31 RLQ ABDOMINAL PAIN: ICD-10-CM

## 2018-10-04 DIAGNOSIS — R30.0 DYSURIA: ICD-10-CM

## 2018-10-04 DIAGNOSIS — R82.90 NONSPECIFIC FINDING ON EXAMINATION OF URINE: ICD-10-CM

## 2018-10-04 DIAGNOSIS — N39.0 URINARY TRACT INFECTION WITHOUT HEMATURIA, SITE UNSPECIFIED: ICD-10-CM

## 2018-10-04 LAB
ALBUMIN UR-MCNC: NEGATIVE MG/DL
APPEARANCE UR: CLEAR
BACTERIA #/AREA URNS HPF: ABNORMAL /HPF
BASOPHILS # BLD AUTO: 0 10E9/L (ref 0–0.2)
BASOPHILS NFR BLD AUTO: 0.3 %
BILIRUB UR QL STRIP: NEGATIVE
COLOR UR AUTO: YELLOW
DIFFERENTIAL METHOD BLD: ABNORMAL
EOSINOPHIL # BLD AUTO: 0.2 10E9/L (ref 0–0.7)
EOSINOPHIL NFR BLD AUTO: 2.4 %
ERYTHROCYTE [DISTWIDTH] IN BLOOD BY AUTOMATED COUNT: 11.4 % (ref 10–15)
GLUCOSE UR STRIP-MCNC: NEGATIVE MG/DL
HCT VFR BLD AUTO: 39.4 % (ref 40–53)
HGB BLD-MCNC: 13.4 G/DL (ref 13.3–17.7)
HGB UR QL STRIP: ABNORMAL
KETONES UR STRIP-MCNC: NEGATIVE MG/DL
LEUKOCYTE ESTERASE UR QL STRIP: NEGATIVE
LYMPHOCYTES # BLD AUTO: 3.9 10E9/L (ref 0.8–5.3)
LYMPHOCYTES NFR BLD AUTO: 41.3 %
MCH RBC QN AUTO: 32.1 PG (ref 26.5–33)
MCHC RBC AUTO-ENTMCNC: 34 G/DL (ref 31.5–36.5)
MCV RBC AUTO: 95 FL (ref 78–100)
MONOCYTES # BLD AUTO: 0.7 10E9/L (ref 0–1.3)
MONOCYTES NFR BLD AUTO: 7.7 %
NEUTROPHILS # BLD AUTO: 4.6 10E9/L (ref 1.6–8.3)
NEUTROPHILS NFR BLD AUTO: 48.3 %
NITRATE UR QL: NEGATIVE
PH UR STRIP: 7 PH (ref 5–7)
PLATELET # BLD AUTO: 185 10E9/L (ref 150–450)
RBC # BLD AUTO: 4.17 10E12/L (ref 4.4–5.9)
RBC #/AREA URNS AUTO: ABNORMAL /HPF
SOURCE: ABNORMAL
SP GR UR STRIP: 1.01 (ref 1–1.03)
UROBILINOGEN UR STRIP-ACNC: 0.2 EU/DL (ref 0.2–1)
WBC # BLD AUTO: 9.5 10E9/L (ref 4–11)
WBC #/AREA URNS AUTO: ABNORMAL /HPF

## 2018-10-04 PROCEDURE — 36415 COLL VENOUS BLD VENIPUNCTURE: CPT | Performed by: PHYSICIAN ASSISTANT

## 2018-10-04 PROCEDURE — 81001 URINALYSIS AUTO W/SCOPE: CPT | Performed by: PHYSICIAN ASSISTANT

## 2018-10-04 PROCEDURE — 87186 SC STD MICRODIL/AGAR DIL: CPT | Performed by: PHYSICIAN ASSISTANT

## 2018-10-04 PROCEDURE — 87088 URINE BACTERIA CULTURE: CPT | Performed by: PHYSICIAN ASSISTANT

## 2018-10-04 PROCEDURE — 85025 COMPLETE CBC W/AUTO DIFF WBC: CPT | Performed by: PHYSICIAN ASSISTANT

## 2018-10-04 PROCEDURE — 90715 TDAP VACCINE 7 YRS/> IM: CPT | Performed by: PHYSICIAN ASSISTANT

## 2018-10-04 PROCEDURE — 87086 URINE CULTURE/COLONY COUNT: CPT | Performed by: PHYSICIAN ASSISTANT

## 2018-10-04 PROCEDURE — 90471 IMMUNIZATION ADMIN: CPT | Performed by: PHYSICIAN ASSISTANT

## 2018-10-04 PROCEDURE — 99214 OFFICE O/P EST MOD 30 MIN: CPT | Mod: 25 | Performed by: PHYSICIAN ASSISTANT

## 2018-10-04 PROCEDURE — 96372 THER/PROPH/DIAG INJ SC/IM: CPT | Performed by: PHYSICIAN ASSISTANT

## 2018-10-04 RX ORDER — CEFTRIAXONE 1 G/1
1000 INJECTION, POWDER, FOR SOLUTION INTRAMUSCULAR; INTRAVENOUS ONCE
Qty: 10 ML | Refills: 0 | OUTPATIENT
Start: 2018-10-04 | End: 2018-10-04

## 2018-10-04 NOTE — PATIENT INSTRUCTIONS
(R30.0) Dysuria  (primary encounter diagnosis)  Comment:   Plan: *UA reflex to Microscopic and Culture ([place]         and [facility] (except [place] and         [place]), Urine Microscopic, Urine Culture         Aerobic Bacterial            (R82.90) Nonspecific finding on examination of urine  Comment:   Plan: Urine Culture Aerobic Bacterial            (R10.31) RLQ abdominal pain  Comment:   Plan: CBC with platelets differential            (N39.0) Urinary tract infection without hematuria, site unspecified  Comment:   Plan: cefTRIAXone (ROCEPHIN) 1 GM vial            Unclear if this is UTI.  Has a bag.    Wbc count is in normal range but had 10-25 wbc in usinr.  Could be contaminate.  alos worried about bowel obstruction or appendicits but exam was underwhelming.  Slight TTP over RLQ under bag.      Well try a rocephin injection because he does report feeling a little better after antibiotics.  Id not resolved requested follow-up

## 2018-10-04 NOTE — PROGRESS NOTES
SUBJECTIVE:   Casey Benitez is a 53 year old male who presents to clinic today for the following health issues:      Casey has a a history of bladder cancer and had a cystectomy with ileal diversion and has a stoma. He was seen by UC last week and diagnosed with a UTI.  He has a specific spot over RLQ that is TTP.  He was given omnicef and has had some mild improvement but still has that pain.  Had an bowel obstruction a number of years ago. Doesn't feel like that.     No fever, nausea or other symptoms      ED/UC Followup:    Facility:  [facility]   Date of visit: Sept 26  Reason for visit: uti  Current Status: better - but still have some discomfort             Problem list and histories reviewed & adjusted, as indicated.  Additional history: as documented    Current Outpatient Prescriptions   Medication Sig Dispense Refill     atenolol (TENORMIN) 100 MG tablet TAKE 1 TABLET (100 MG) BY MOUTH DAILY 90 tablet 1     budesonide-formoterol (SYMBICORT) 160-4.5 MCG/ACT Inhaler Inhale 2 puffs into the lungs 2 times daily as needed 3 Inhaler 2     hydrochlorothiazide 12.5 MG TABS tablet TAKE 1 TABLET (12.5 MG) BY MOUTH DAILY 90 tablet 0     lisinopril (PRINIVIL/ZESTRIL) 20 MG tablet TAKE 1.5 TABLETS (30 MG) BY MOUTH DAILY 135 tablet 0     cyabnocobalamin (VITAMIN B-12) 2500 MCG sublingual tablet Place 2,500 mcg under the tongue daily (Patient not taking: Reported on 9/26/2018) 30 tablet 3     levofloxacin (LEVAQUIN) 500 MG tablet Take 1 tablet (500 mg) by mouth daily (Patient not taking: Reported on 9/26/2018) 10 tablet 0     LORazepam (ATIVAN) 1 MG tablet Take 0.5 tablets (0.5 mg) by mouth nightly as needed for anxiety (Patient not taking: Reported on 9/26/2018) 30 tablet 0     BP Readings from Last 3 Encounters:   10/04/18 123/70   09/26/18 130/70   02/05/18 145/81    Wt Readings from Last 3 Encounters:   10/04/18 199 lb 14.4 oz (90.7 kg)   02/05/18 211 lb (95.7 kg)   01/29/18 209 lb 11.2 oz (95.1 kg)           "          Reviewed and updated as needed this visit by clinical staff       Reviewed and updated as needed this visit by Provider         ROS:  Constitutional, HEENT, cardiovascular, pulmonary, gi and gu systems are negative, except as otherwise noted.    OBJECTIVE:                                                    /70 (BP Location: Right arm, Patient Position: Chair, Cuff Size: Adult Large)  Pulse 65  Temp 97.9  F (36.6  C) (Oral)  Ht 5' 10.75\" (1.797 m)  Wt 199 lb 14.4 oz (90.7 kg)  SpO2 97%  BMI 28.08 kg/m2  Body mass index is 28.08 kg/(m^2).  GENERAL APPEARANCE: healthy, alert and no distress  HENT: ear canals and TM's normal and nose and mouth without ulcers or lesions  RESP: lungs clear to auscultation - no rales, rhonchi or wheezes  CV: regular rates and rhythm, normal S1 S2, no S3 or S4 and no murmur, click or rub  LYMPHATICS: no cervical adenopathy  ABDOMEN: TTP in RLQ but no gaurding and located under stoma         ASSESSMENT/PLAN:                                                    1. Dysuria    - *UA reflex to Microscopic and Culture ([place] and [facility] (except [place] and [place])  - Urine Microscopic  - Urine Culture Aerobic Bacterial  - CEFTRIAXONE NA INJ /250MG  - INJECTION INTRAMUSCULAR OR SUB-Q    2. Nonspecific finding on examination of urine    - Urine Culture Aerobic Bacterial    3. RLQ abdominal pain  ? Etiology: uti vs appendicitis vs bowel obstruction.   Pain is not rated severe and has improved over the last few days. Discuss possible causes and recommended he go to ED if symptoms worsen over the next few day.s   Has had too many CT scans to reluctant to get another but if symptoms worsen well have to.  Advised close follow-up if no improvement   - CBC with platelets differential    4. Urinary tract infection without hematuria, site unspecified  Well try rocephin injection.   - cefTRIAXone (ROCEPHIN) 1 GM vial; Inject 1 g (1,000 mg) into the muscle once for 1 dose  " Dispense: 10 mL; Refill: 0    5. Encounter for immunization    - TDAP, IM (10 - 64 YRS) - Adacel      Patient Instructions   (R30.0) Dysuria  (primary encounter diagnosis)  Comment:   Plan: *UA reflex to Microscopic and Culture ([place]         and [facility] (except [place] and         [place]), Urine Microscopic, Urine Culture         Aerobic Bacterial            (R82.90) Nonspecific finding on examination of urine  Comment:   Plan: Urine Culture Aerobic Bacterial            (R10.31) RLQ abdominal pain  Comment:   Plan: CBC with platelets differential            (N39.0) Urinary tract infection without hematuria, site unspecified  Comment:   Plan: cefTRIAXone (ROCEPHIN) 1 GM vial            Unclear if this is UTI.  Has a bag.    Wbc count is in normal range but had 10-25 wbc in usinr.  Could be contaminate.  alos worried about bowel obstruction or appendicits but exam was underwhelming.  Slight TTP over RLQ under bag.      Well try a rocephin injection because he does report feeling a little better after antibiotics.  Id not resolved requested follow-up           Ramona Ann Aaseby-Aguilera, PA-C  [facility]

## 2018-10-04 NOTE — MR AVS SNAPSHOT
After Visit Summary   10/4/2018    Casey Benitez    MRN: [id number]           Patient Information     Date Of Birth          1965        Visit Information        Provider Department      10/4/2018 2:30 PM Aaseby-Aguilera, Ramona Ann, PA-C [facility]        Today's Diagnoses     Dysuria    -  1    Nonspecific finding on examination of urine        RLQ abdominal pain        Urinary tract infection without hematuria, site unspecified          Care Instructions    (R30.0) Dysuria  (primary encounter diagnosis)  Comment:   Plan: *UA reflex to Microscopic and Culture ([place]         and [facility] (except [facility] [place] and         [place]), Urine Microscopic, Urine Culture         Aerobic Bacterial            (R82.90) Nonspecific finding on examination of urine  Comment:   Plan: Urine Culture Aerobic Bacterial            (R10.31) RLQ abdominal pain  Comment:   Plan: CBC with platelets differential            (N39.0) Urinary tract infection without hematuria, site unspecified  Comment:   Plan: cefTRIAXone (ROCEPHIN) 1 GM vial            Unclear if this is UTI.  Has a bag.    Wbc count is in normal range but had 10-25 wbc in usinr.  Could be contaminate.  alos worried about bowel obstruction or appendicits but exam was underwhelming.  Slight TTP over RLQ under bag.      Well try a rocephin injection because he does report feeling a little better after antibiotics.  Id not resolved requested follow-up               Follow-ups after your visit        Follow-up notes from your care team     Return in about 2 weeks (around 10/18/2018) for Routine Visit.      Your next 10 appointments already scheduled     Feb 04, 2019 10:30 AM CST   Return Visit with  ONCOLOGY NURSE   [facility] ([facility])    [facility]  [address]  [facility] [address]   [phone]            Feb 04, 2019 11:00 AM CST   CT  CHEST/ABDOMEN/PELVIS W CONTRAST with [id number]   [facility] [place] ([facility])    [address]  [facility] [address] [phone]           How do I prepare for my exam? (Food and drink instructions) To prepare: Do not eat or drink for 2 hours before your exam. If you need to take medicine, you may take it with small sips of water. (We may ask you to take liquid medicine as well.)  How do I prepare for my exam? (Other instructions) Please arrive 30 minutes early for your CT.  Once in the department you might be asked to drink water 15-20 minutes prior to your exam.  If indicated you may be asked to drink an oral contrast in advance of your CT.  If this is the case, the imaging team will let you know or be in contact with you prior to your appointment  Patients over 70 or patients with diabetes or kidney problems: If you haven t had a blood test (creatinine test) within the last 30 days, the Cardiologist/Radiologist may require you to get this test prior to your exam.  If you have diabetes:  Continue to take your metformin medication on the day of your exam  What should I wear: Please wear loose clothing, such as a sweat suit or jogging clothes. Avoid snaps, zippers and other metal. We may ask you to undress and put on a hospital gown.  How long does the exam take: Most scans take less than 20 minutes.  What should I bring: Please bring any scans or X-rays taken at other hospitals, if similar tests were done. Also bring a list of your medicines, including vitamins, minerals and over-the-counter drugs. It is safest to leave personal items at home.  Do I need a : No  is needed.  What do I need to tell my doctor? Be sure to tell your doctor: * If you have any allergies. * If there s any chance you are pregnant. * If you are breastfeeding.  What should I do after the exam: No restrictions, You may resume normal activities.  What is this test: A CT  (computed tomography) scan is a series of pictures that allows us to look inside your body. The scanner creates images of the body in cross sections, much like slices of bread. This helps us see any problems more clearly. You may receive contrast (X-ray dye) before or during your scan. You will be asked to drink the contrast.  Who should I call with questions: If you have any questions, please call the Imaging Department where you will have your exam. Directions, parking instructions, and other information is available on our website, [email or web address].            Feb 11, 2019 10:30 AM CST   Return Visit with Constanza Mccarty MD   [facility] ([facility])    [facility]  [address]  [facility] [address] [phone]              Who to contact     If you have questions or need follow up information about today's clinic visit or your schedule please contact [facility] directly at [phone].  Normal or non-critical lab and imaging results will be communicated to you by TripleGifthart, letter or phone within 4 business days after the clinic has received the results. If you do not hear from us within 7 days, please contact the clinic through Songdropt or phone. If you have a critical or abnormal lab result, we will notify you by phone as soon as possible.  Submit refill requests through SproutBox or call your pharmacy and they will forward the refill request to us. Please allow 3 business days for your refill to be completed.          Additional Information About Your Visit        SproutBox Information     [employer] gives you secure access to your electronic health record. If you see a primary care provider, you can also send messages to your care team and make appointments. If you have questions, please call your primary care clinic.  If you do not have a primary care provider, please call [phone] and they will assist you.        Care  "EveryWhere ID     This is your Care EveryWhere ID. This could be used by other organizations to access your [place] medical records  [id number]        Your Vitals Were     Pulse Temperature Height Pulse Oximetry BMI (Body Mass Index)       65 97.9  F (36.6  C) (Oral) 5' 10.75\" (1.797 m) 97% 28.08 kg/m2        Blood Pressure from Last 3 Encounters:   10/04/18 123/70   09/26/18 130/70   02/05/18 145/81    Weight from Last 3 Encounters:   10/04/18 199 lb 14.4 oz (90.7 kg)   02/05/18 211 lb (95.7 kg)   01/29/18 209 lb 11.2 oz (95.1 kg)              We Performed the Following     *UA reflex to Microscopic and Culture ([place] and [place] [facility] (except [place] and [place])     CBC with platelets differential     Urine Culture Aerobic Bacterial     Urine Microscopic          Today's Medication Changes          These changes are accurate as of 10/4/18  3:31 PM.  If you have any questions, ask your nurse or doctor.               Start taking these medicines.        Dose/Directions    cefTRIAXone 1 GM vial   Commonly known as:  ROCEPHIN   Used for:  Urinary tract infection without hematuria, site unspecified   Started by:  Aaseby-Aguilera, Ramona Ann, PA-C        Dose:  1000 mg   Inject 1 g (1,000 mg) into the muscle once for 1 dose   Quantity:  10 mL   Refills:  0            Where to get your medicines      Some of these will need a paper prescription and others can be bought over the counter.  Ask your nurse if you have questions.     You don't need a prescription for these medications     cefTRIAXone 1 GM vial                Primary Care Provider Office Phone # Fax #    Ramona Ann Aaseby-Aguilera, PA-C [phone] [address] [address]        Equal Access to Services     LORETTA TURNER : Priscilla padillao Sobraxton, waaxda luqadaha, qaybta kaalmada adeegyada, elie padron. So [facility] [phone].    ATENCIÓN: Si raila español tiene a braxton disposición " servicios gratuitos de asistencia lingüística. Kelechi schroeder [phone].    We comply with applicable federal civil rights laws and [place] laws. We do not discriminate on the basis of race, color, national origin, age, disability, sex, sexual orientation, or gender identity.            Thank you!     Thank you for choosing [facility]  for your care. Our goal is always to provide you with excellent care. Hearing back from our patients is one way we can continue to improve our services. Please take a few minutes to complete the written survey that you may receive in the mail after your visit with us. Thank you!             Your Updated Medication List - Protect others around you: Learn how to safely use, store and throw away your medicines at www.disposemymeds.org.          This list is accurate as of 10/4/18  3:31 PM.  Always use your most recent med list.                   Brand Name Dispense Instructions for use Diagnosis    atenolol 100 MG tablet    TENORMIN    90 tablet    TAKE 1 TABLET (100 MG) BY MOUTH DAILY    Essential hypertension with goal blood pressure less than 140/90       budesonide-formoterol 160-4.5 MCG/ACT Inhaler    SYMBICORT    3 Inhaler    Inhale 2 puffs into the lungs 2 times daily as needed    Simple chronic bronchitis (H)       cefTRIAXone 1 GM vial    ROCEPHIN    10 mL    Inject 1 g (1,000 mg) into the muscle once for 1 dose    Urinary tract infection without hematuria, site unspecified       cyanocobalamin 2500 MCG sublingual tablet    VITAMIN B-12    30 tablet    Place 2,500 mcg under the tongue daily    Vitamin B12 deficiency (non anemic), Malignant neoplasm of urinary bladder, unspecified site (H)       hydrochlorothiazide 12.5 MG Tabs tablet     90 tablet    TAKE 1 TABLET (12.5 MG) BY MOUTH DAILY    Hypertension goal BP (blood pressure) < 140/90       levofloxacin 500 MG tablet    LEVAQUIN    10 tablet    Take 1 tablet (500 mg) by mouth daily    Epididymitis        lisinopril 20 MG tablet    PRINIVIL/ZESTRIL    135 tablet    TAKE 1.5 TABLETS (30 MG) BY MOUTH DAILY    Hypertension goal BP (blood pressure) < 140/90       LORazepam 1 MG tablet    ATIVAN    30 tablet    Take 0.5 tablets (0.5 mg) by mouth nightly as needed for anxiety    Anxiety

## 2018-10-08 LAB
BACTERIA SPEC CULT: ABNORMAL
BACTERIA SPEC CULT: ABNORMAL
SPECIMEN SOURCE: ABNORMAL

## 2018-11-15 DIAGNOSIS — I10 ESSENTIAL HYPERTENSION WITH GOAL BLOOD PRESSURE LESS THAN 140/90: ICD-10-CM

## 2018-11-15 RX ORDER — ATENOLOL 100 MG/1
TABLET ORAL
Qty: 90 TABLET | Refills: 1 | Status: SHIPPED | OUTPATIENT
Start: 2018-11-15 | End: 2019-02-17

## 2018-11-15 NOTE — TELEPHONE ENCOUNTER
Prescription approved per [facility] Refill Protocol.  Ricardo Lopez RN, BSN

## 2018-11-15 NOTE — TELEPHONE ENCOUNTER
"Requested Prescriptions   Pending Prescriptions Disp Refills     atenolol (TENORMIN) 100 MG tablet [Pharmacy Med Name: ATENOLOL 100 MG TABLET] 90 tablet 1    Last Written Prescription Date:  05/17/2018  Last Fill Quantity: 90 tablet,  # refills: 1   Last office visit: 10/4/2018 with prescribing provider:  10/04/2018   Future Office Visit:   Next 5 appointments (look out 90 days)     Feb 04, 2019 10:30 AM CST   Return Visit with  ONCOLOGY NURSE   [facility] ([facility])    [facility]  [address]  [facility] [address]   [phone]            Feb 11, 2019 10:30 AM CST   Return Visit with Constanza Mccarty MD   [facility] ([facility])    [facility]  [address]  [facility] [address]   [phone]                  Sig: TAKE 1 TABLET (100 MG) BY MOUTH DAILY    Beta-Blockers Protocol Passed    11/15/2018  3:52 AM       Passed - Blood pressure under 140/90 in past 12 months    BP Readings from Last 3 Encounters:   10/04/18 123/70   09/26/18 130/70   02/05/18 145/81                Passed - Patient is age 6 or older       Passed - Recent (12 mo) or future (30 days) visit within the authorizing provider's specialty    Patient had office visit in the last 12 months or has a visit in the next 30 days with authorizing provider or within the authorizing provider's specialty.  See \"Patient Info\" tab in inbasket, or \"Choose Columns\" in Meds & Orders section of the refill encounter.                Ludwin Landers XRT  "

## 2019-01-07 DIAGNOSIS — I10 HYPERTENSION GOAL BP (BLOOD PRESSURE) < 140/90: ICD-10-CM

## 2019-01-07 RX ORDER — HYDROCHLOROTHIAZIDE 12.5 MG/1
TABLET ORAL
Qty: 90 TABLET | Refills: 0 | Status: SHIPPED | OUTPATIENT
Start: 2019-01-07 | End: 2019-02-17

## 2019-01-07 RX ORDER — LISINOPRIL 20 MG/1
TABLET ORAL
Qty: 135 TABLET | Refills: 0 | Status: SHIPPED | OUTPATIENT
Start: 2019-01-07 | End: 2019-02-17

## 2019-01-07 NOTE — TELEPHONE ENCOUNTER
"Requested Prescriptions   Pending Prescriptions Disp Refills     hydrochlorothiazide (HYDRODIURIL) 12.5 MG tablet [Pharmacy Med Name: HYDROCHLOROTHIAZIDE 12.5 MG TB] 90 tablet 0    Last Written Prescription Date:  04/17/2018  Last Fill Quantity: 90 tablet,  # refills: 0   Last office visit: 10/4/2018 with prescribing provider:  10/04/2018   Future Office Visit:   Next 5 appointments (look out 90 days)    Feb 04, 2019 10:30 AM CST  Return Visit with  ONCOLOGY NURSE  [facility]  [address]  [facility] [address]  [phone]   Feb 11, 2019 10:30 AM CST  Return Visit with Constanza Mccarty MD  [facility]  [address]  [facility] [address]  [phone]          Sig: TAKE 1 TABLET (12.5 MG) BY MOUTH DAILY    Diuretics (Including Combos) Protocol Passed - 1/7/2019  9:24 AM       Passed - Blood pressure under 140/90 in past 12 months    BP Readings from Last 3 Encounters:   10/04/18 123/70   09/26/18 130/70   02/05/18 145/81                Passed - Recent (12 mo) or future (30 days) visit within the authorizing provider's specialty    Patient had office visit in the last 12 months or has a visit in the next 30 days with authorizing provider or within the authorizing provider's specialty.  See \"Patient Info\" tab in inbasket, or \"Choose Columns\" in Meds & Orders section of the refill encounter.             Passed - Medication is active on med list       Passed - Patient is age 18 or older       Passed - Normal serum creatinine on file in past 12 months    Recent Labs   Lab Test 02/02/18  1115   CR 0.84             Passed - Normal serum potassium on file in past 12 months    Recent Labs   Lab Test 02/02/18  1115   POTASSIUM 4.0                   Passed - Normal serum sodium on file in past 12 months    Recent Labs   Lab Test " "02/02/18  1115                       lisinopril (PRINIVIL/ZESTRIL) 20 MG tablet [Pharmacy Med Name: LISINOPRIL 20 MG TABLET] 135 tablet 0    Last Written Prescription Date:  04/17/2018  Last Fill Quantity: 135 tablet,  # refills: 0   Last office visit: 10/4/2018 with prescribing provider:  10/04/2018   Future Office Visit:   Next 5 appointments (look out 90 days)    Feb 04, 2019 10:30 AM CST  Return Visit with  ONCOLOGY NURSE  [facility] ([facility]) [facility]  [address]  [facility] [address]  [phone]   Feb 11, 2019 10:30 AM CST  Return Visit with Constanza Mccarty MD  [facility] ([facility]) [facility]  [address]  [facility] [address]  [phone]          Sig: TAKE 1.5 TABLETS (30 MG) BY MOUTH DAILY    ACE Inhibitors (Including Combos) Protocol Passed - 1/7/2019  9:24 AM       Passed - Blood pressure under 140/90 in past 12 months    BP Readings from Last 3 Encounters:   10/04/18 123/70   09/26/18 130/70   02/05/18 145/81                Passed - Recent (12 mo) or future (30 days) visit within the authorizing provider's specialty    Patient had office visit in the last 12 months or has a visit in the next 30 days with authorizing provider or within the authorizing provider's specialty.  See \"Patient Info\" tab in inbasket, or \"Choose Columns\" in Meds & Orders section of the refill encounter.             Passed - Medication is active on med list       Passed - Patient is age 18 or older       Passed - Normal serum creatinine on file in past 12 months    Recent Labs   Lab Test 02/02/18  1115   CR 0.84            Passed - Normal serum potassium on file in past 12 months    Recent Labs   Lab Test 02/02/18  1115   POTASSIUM 4.0               Ludwin WESTBROOK  "

## 2019-01-07 NOTE — TELEPHONE ENCOUNTER
Routing refill request to provider for review/approval because:  A break in medication  Ricardo Lopez RN, BSN

## 2019-02-04 DIAGNOSIS — C67.0 MALIGNANT NEOPLASM OF TRIGONE OF URINARY BLADDER (H): Primary | ICD-10-CM

## 2019-02-17 ENCOUNTER — MYC REFILL (OUTPATIENT)
Dept: FAMILY MEDICINE | Facility: CLINIC | Age: 54
End: 2019-02-17

## 2019-02-17 DIAGNOSIS — I10 HYPERTENSION GOAL BP (BLOOD PRESSURE) < 140/90: ICD-10-CM

## 2019-02-17 DIAGNOSIS — I10 ESSENTIAL HYPERTENSION WITH GOAL BLOOD PRESSURE LESS THAN 140/90: ICD-10-CM

## 2019-02-19 RX ORDER — LISINOPRIL 20 MG/1
30 TABLET ORAL DAILY
Qty: 135 TABLET | Refills: 1 | Status: SHIPPED | OUTPATIENT
Start: 2019-02-19 | End: 2019-05-23

## 2019-02-19 RX ORDER — ATENOLOL 100 MG/1
100 TABLET ORAL DAILY
Qty: 90 TABLET | Refills: 1 | Status: SHIPPED | OUTPATIENT
Start: 2019-02-19 | End: 2019-05-23

## 2019-02-19 RX ORDER — HYDROCHLOROTHIAZIDE 12.5 MG/1
12.5 TABLET ORAL DAILY
Qty: 90 TABLET | Refills: 1 | Status: SHIPPED | OUTPATIENT
Start: 2019-02-19 | End: 2019-05-23

## 2019-02-19 NOTE — TELEPHONE ENCOUNTER
"Routing refill request to provider for review/approval because:  Labs not current:  CR, K  Ricardo Lopez RN, BSN            Requested Prescriptions   Pending Prescriptions Disp Refills     atenolol (TENORMIN) 100 MG tablet 90 tablet 1     Sig: Take 1 tablet (100 mg) by mouth daily    Beta-Blockers Protocol Passed - 2/17/2019  3:02 PM       Passed - Blood pressure under 140/90 in past 12 months    BP Readings from Last 3 Encounters:   10/04/18 123/70   09/26/18 130/70   02/05/18 145/81                Passed - Patient is age 6 or older       Passed - Recent (12 mo) or future (30 days) visit within the authorizing provider's specialty    Patient had office visit in the last 12 months or has a visit in the next 30 days with authorizing provider or within the authorizing provider's specialty.  See \"Patient Info\" tab in inbasket, or \"Choose Columns\" in Meds & Orders section of the refill encounter.             Passed - Medication is active on med list        hydrochlorothiazide (HYDRODIURIL) 12.5 MG tablet 90 tablet 0    Diuretics (Including Combos) Protocol Failed - 2/17/2019  3:02 PM       Failed - Normal serum creatinine on file in past 12 months    Recent Labs   Lab Test 02/02/18  1115   CR 0.84             Failed - Normal serum potassium on file in past 12 months    Recent Labs   Lab Test 02/02/18  1115   POTASSIUM 4.0                   Failed - Normal serum sodium on file in past 12 months    Recent Labs   Lab Test 02/02/18  1115                Passed - Blood pressure under 140/90 in past 12 months    BP Readings from Last 3 Encounters:   10/04/18 123/70   09/26/18 130/70   02/05/18 145/81                Passed - Recent (12 mo) or future (30 days) visit within the authorizing provider's specialty    Patient had office visit in the last 12 months or has a visit in the next 30 days with authorizing provider or within the authorizing provider's specialty.  See \"Patient Info\" tab in inbasket, or \"Choose Columns\" in " "Meds & Orders section of the refill encounter.             Passed - Medication is active on med list       Passed - Patient is age 18 or older        lisinopril (PRINIVIL/ZESTRIL) 20 MG tablet 135 tablet 0    ACE Inhibitors (Including Combos) Protocol Failed - 2/17/2019  3:02 PM       Failed - Normal serum creatinine on file in past 12 months    Recent Labs   Lab Test 02/02/18  1115   CR 0.84            Failed - Normal serum potassium on file in past 12 months    Recent Labs   Lab Test 02/02/18  1115   POTASSIUM 4.0            Passed - Blood pressure under 140/90 in past 12 months    BP Readings from Last 3 Encounters:   10/04/18 123/70   09/26/18 130/70   02/05/18 145/81                Passed - Recent (12 mo) or future (30 days) visit within the authorizing provider's specialty    Patient had office visit in the last 12 months or has a visit in the next 30 days with authorizing provider or within the authorizing provider's specialty.  See \"Patient Info\" tab in inbasket, or \"Choose Columns\" in Meds & Orders section of the refill encounter.             Passed - Medication is active on med list       Passed - Patient is age 18 or older          "

## 2019-02-21 DIAGNOSIS — C67.9 BLADDER CANCER (H): Primary | ICD-10-CM

## 2019-03-07 ENCOUNTER — HOSPITAL ENCOUNTER (OUTPATIENT)
Dept: CT IMAGING | Facility: CLINIC | Age: 54
Discharge: HOME | End: 2019-03-07
Attending: INTERNAL MEDICINE | Admitting: INTERNAL MEDICINE
Payer: COMMERCIAL

## 2019-03-07 ENCOUNTER — HOSPITAL ENCOUNTER (OUTPATIENT)
Facility: CLINIC | Age: 54
Setting detail: SPECIMEN
End: 2019-03-07
Attending: INTERNAL MEDICINE
Payer: COMMERCIAL

## 2019-03-07 ENCOUNTER — HOSPITAL ENCOUNTER (OUTPATIENT)
Facility: CLINIC | Age: 54
Setting detail: SPECIMEN
Discharge: HOME | End: 2019-03-07
Attending: INTERNAL MEDICINE | Admitting: INTERNAL MEDICINE
Payer: COMMERCIAL

## 2019-03-07 ENCOUNTER — ONCOLOGY VISIT (OUTPATIENT)
Dept: ONCOLOGY | Facility: CLINIC | Age: 54
End: 2019-03-07
Attending: INTERNAL MEDICINE
Payer: COMMERCIAL

## 2019-03-07 DIAGNOSIS — C67.0 MALIGNANT NEOPLASM OF TRIGONE OF URINARY BLADDER (H): ICD-10-CM

## 2019-03-07 DIAGNOSIS — C67.9 BLADDER CANCER (H): ICD-10-CM

## 2019-03-07 LAB
ALBUMIN SERPL-MCNC: 4.1 G/DL (ref 3.4–5)
ALP SERPL-CCNC: 72 U/L (ref 40–150)
ALT SERPL W P-5'-P-CCNC: 39 U/L (ref 0–70)
ANION GAP SERPL CALCULATED.3IONS-SCNC: 6 MMOL/L (ref 3–14)
AST SERPL W P-5'-P-CCNC: 25 U/L (ref 0–45)
BASOPHILS # BLD AUTO: 0.1 10E9/L (ref 0–0.2)
BASOPHILS NFR BLD AUTO: 0.9 %
BILIRUB SERPL-MCNC: 0.7 MG/DL (ref 0.2–1.3)
BUN SERPL-MCNC: 15 MG/DL (ref 7–30)
CALCIUM SERPL-MCNC: 9.1 MG/DL (ref 8.5–10.1)
CHLORIDE SERPL-SCNC: 105 MMOL/L (ref 94–109)
CO2 SERPL-SCNC: 27 MMOL/L (ref 20–32)
CREAT SERPL-MCNC: 0.86 MG/DL (ref 0.66–1.25)
DIFFERENTIAL METHOD BLD: NORMAL
EOSINOPHIL # BLD AUTO: 0.2 10E9/L (ref 0–0.7)
EOSINOPHIL NFR BLD AUTO: 2.4 %
ERYTHROCYTE [DISTWIDTH] IN BLOOD BY AUTOMATED COUNT: 12.2 % (ref 10–15)
GFR SERPL CREATININE-BSD FRML MDRD: >90 ML/MIN/{1.73_M2}
GLUCOSE SERPL-MCNC: 97 MG/DL (ref 70–99)
HCT VFR BLD AUTO: 44.8 % (ref 40–53)
HGB BLD-MCNC: 15.1 G/DL (ref 13.3–17.7)
IMM GRANULOCYTES # BLD: 0 10E9/L (ref 0–0.4)
IMM GRANULOCYTES NFR BLD: 0.4 %
LYMPHOCYTES # BLD AUTO: 3.4 10E9/L (ref 0.8–5.3)
LYMPHOCYTES NFR BLD AUTO: 33.9 %
MCH RBC QN AUTO: 32.6 PG (ref 26.5–33)
MCHC RBC AUTO-ENTMCNC: 33.7 G/DL (ref 31.5–36.5)
MCV RBC AUTO: 97 FL (ref 78–100)
MONOCYTES # BLD AUTO: 0.9 10E9/L (ref 0–1.3)
MONOCYTES NFR BLD AUTO: 8.7 %
NEUTROPHILS # BLD AUTO: 5.5 10E9/L (ref 1.6–8.3)
NEUTROPHILS NFR BLD AUTO: 53.7 %
NRBC # BLD AUTO: 0 10*3/UL
NRBC BLD AUTO-RTO: 0 /100
PLATELET # BLD AUTO: 198 10E9/L (ref 150–450)
POTASSIUM SERPL-SCNC: 3.6 MMOL/L (ref 3.4–5.3)
PROT SERPL-MCNC: 8.4 G/DL (ref 6.8–8.8)
RBC # BLD AUTO: 4.63 10E12/L (ref 4.4–5.9)
SODIUM SERPL-SCNC: 138 MMOL/L (ref 133–144)
VIT B12 SERPL-MCNC: 482 PG/ML (ref 193–986)
WBC # BLD AUTO: 10.2 10E9/L (ref 4–11)

## 2019-03-07 PROCEDURE — 25000128 H RX IP 250 OP 636: Performed by: RADIOLOGY

## 2019-03-07 PROCEDURE — 36415 COLL VENOUS BLD VENIPUNCTURE: CPT

## 2019-03-07 PROCEDURE — 71260 CT THORAX DX C+: CPT

## 2019-03-07 PROCEDURE — 82607 VITAMIN B-12: CPT | Performed by: INTERNAL MEDICINE

## 2019-03-07 PROCEDURE — 85025 COMPLETE CBC W/AUTO DIFF WBC: CPT | Performed by: INTERNAL MEDICINE

## 2019-03-07 PROCEDURE — 88112 CYTOPATH CELL ENHANCE TECH: CPT | Performed by: INTERNAL MEDICINE

## 2019-03-07 PROCEDURE — 80053 COMPREHEN METABOLIC PANEL: CPT | Performed by: INTERNAL MEDICINE

## 2019-03-07 PROCEDURE — 88112 CYTOPATH CELL ENHANCE TECH: CPT | Mod: 26 | Performed by: INTERNAL MEDICINE

## 2019-03-07 PROCEDURE — 74177 CT ABD & PELVIS W/CONTRAST: CPT

## 2019-03-07 RX ORDER — IOPAMIDOL 755 MG/ML
500 INJECTION, SOLUTION INTRAVASCULAR ONCE
Status: COMPLETED | OUTPATIENT
Start: 2019-03-07 | End: 2019-03-07

## 2019-03-07 RX ADMIN — IOPAMIDOL 97 ML: 755 INJECTION, SOLUTION INTRAVENOUS at 09:58

## 2019-03-07 RX ADMIN — SODIUM CHLORIDE 64 ML: 9 INJECTION, SOLUTION INTRAVENOUS at 09:58

## 2019-03-07 NOTE — LETTER
3/7/2019         RE: Casey Benitez  [address] [place] [address]        Dear Colleague,    Thank you for referring your patient, Casey Benitez, to the [facility]. Please see a copy of my visit note below.    Medical Assistant Note:  Casey Benitez presents today for blood draw.    Patient seen by provider today: No.   present during visit today: Not Applicable.    Concerns: No Concerns.    Procedure:  Lab draw site:left antecub, Needle type: butterfly, Gauge: 23.  And cytology urine collection.    Post Assessment:  Labs drawn without difficulty: Yes.    Discharge Plan:  Departure Mode: Ambulatory.    Face to Face Time: 10.    Indigo [facility]              Again, thank you for allowing me to participate in the care of your patient.        Sincerely,        [facility] Oncology Nurse

## 2019-03-07 NOTE — PROGRESS NOTES
Medical Assistant Note:  Caseyalana Benitez presents today for blood draw.    Patient seen by provider today: No.   present during visit today: Not Applicable.    Concerns: No Concerns.    Procedure:  Lab draw site:left antecub, Needle type: butterfly, Gauge: 23.  And cytology urine collection.    Post Assessment:  Labs drawn without difficulty: Yes.    Discharge Plan:  Departure Mode: Ambulatory.    Face to Face Time: 10.    Indigo Hawkins, CMA

## 2019-03-08 LAB — COPATH REPORT: NORMAL

## 2019-03-11 ENCOUNTER — ONCOLOGY VISIT (OUTPATIENT)
Dept: ONCOLOGY | Facility: CLINIC | Age: 54
End: 2019-03-11
Attending: INTERNAL MEDICINE
Payer: COMMERCIAL

## 2019-03-11 VITALS
TEMPERATURE: 96.8 F | RESPIRATION RATE: 16 BRPM | SYSTOLIC BLOOD PRESSURE: 109 MMHG | HEIGHT: 70 IN | DIASTOLIC BLOOD PRESSURE: 63 MMHG | OXYGEN SATURATION: 99 % | BODY MASS INDEX: 27.06 KG/M2 | WEIGHT: 189 LBS | HEART RATE: 50 BPM

## 2019-03-11 DIAGNOSIS — C67.0 MALIGNANT NEOPLASM OF TRIGONE OF URINARY BLADDER (H): Primary | ICD-10-CM

## 2019-03-11 PROCEDURE — 99213 OFFICE O/P EST LOW 20 MIN: CPT | Performed by: INTERNAL MEDICINE

## 2019-03-11 PROCEDURE — G0463 HOSPITAL OUTPT CLINIC VISIT: HCPCS

## 2019-03-11 ASSESSMENT — PAIN SCALES - GENERAL: PAINLEVEL: NO PAIN (0)

## 2019-03-11 ASSESSMENT — MIFFLIN-ST. JEOR: SCORE: 1693.55

## 2019-03-11 NOTE — PROGRESS NOTES
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  He completed cycle 4 on 5/28/2013. He had some treatment delays due to thrombocytopenia.  He underwent surgery on 7/30 final pathology revealed a T3N0MX tumor. His CT showed a paratrachael node (left at 1.1 cm), concern for metastatic disease, therefore a PET was obtained which did not show this node being metabolically active.    SUBJECTIVE   Patient comes in for a follow up today. He has been feeling well. No new symptoms.   HEMATOLOGY ONCOLOGY HISTORY   In April of 2012, Casey noticed some blood in his urine which was confirmed by a UA. He was treated for urinary tract infection and failed to followup until November of this year he saw Dr. Zee.  Due to persistent painless hematuria, cystoscopy was obtained which revealed a large bladder tumor. Final pathology revealed a grade 3 PT2 muscle invasive transitional cell carcinoma of the bladder. During his initial visit, we discussed starting treatment in the neoadjuvant fashion with cisplatin and gemcitabine and the rationale behind the drugs and treatment.  Casey had his mediport placed. We obtained metastatic workup with CXR and a bone scan (rib pain) which did not reveal any evidence of metastatic disease  Casey started Cisplatin and Gemcitabine on 1/15/2013.  He completed cycle 4 on 5/28/2013. He had some treatment delays due to thrombocytopenia.  He feels like his Energy has improved. He underwent surgery on 7/30 final pathology revealed a T3N0MX tumor. He is feeling well after the surgery   His CT showed a paratrachael node (left at 1.1 cm), concern for metastatic disease, therefore a PET was obtained which did not show this node being metabolically active      "REVIEW OF SYSTEMS   As above in the HPI, o/w a complete ROS was negative.    Past medical, social histories reviewed.    Meds- Reviewed.     PHYSICAL EXAM   /63   Pulse 50   Temp 96.8  F (36  C) (Tympanic)   Resp 16   Ht 1.778 m (5' 10\")   Wt 85.7 kg (189 lb)   SpO2 99%   BMI 27.12 kg/m    CONSTITUTIONAL: Sitting comfortably.   HEENT: Pupils are equal. Oropharynx is clear.   NECK: No cervical or supraclavicular lymphadenopathy.   RESPIRATORY: Clear bilaterally.   CARD/VASC: S1, S2, regular.   GI: Soft, nontender, nondistended, no hepatosplenomegaly.   MUSKULOSKELETAL: Warm, well perfused.   NEUROLOGIC: Alert, awake.   INTEGUMENT: No rash.   LYMPHATICS: No edema.   PSYCH: Mood and affect was normal.    LABORATORY DATA AND IMAGING REVIEWED DURING THIS VISIT:  Recent Labs   Lab Test 03/07/19  0902 02/02/18  1115  05/06/13  0840  04/01/13  0935    137   < > 137   < > 138   POTASSIUM 3.6 4.0   < > 3.8   < > 4.1   CHLORIDE 105 103   < > 96   < > 100   CO2 27 29   < > 27   < > 30   ANIONGAP 6 5   < > 13.6   < > 9   BUN 15 13   < > 8   < > 9   CR 0.86 0.84   < > 0.82   < > 0.83   GLC 97 103*   < > 173*   < > 96   KIRBY 9.1 9.2   < > 9.1   < > 9.2   MAG  --   --   --  1.7  --  2.0    < > = values in this interval not displayed.     Recent Labs   Lab Test 03/07/19  0902 10/04/18  1508 09/26/18  1518   WBC 10.2 9.5 9.2   HGB 15.1 13.4 13.8    185 194   MCV 97 95 94   NEUTROPHIL 53.7 48.3 53.3     Recent Labs   Lab Test 03/07/19  0902 02/02/18  1115 07/31/17  1010   BILITOTAL 0.7 0.5 0.6   ALKPHOS 72 77 70   ALT 39 44 44   AST 25 31 40   ALBUMIN 4.1 4.0 3.8         Results for orders placed or performed during the hospital encounter of 03/07/19   CT Chest/Abdomen/Pelvis w Contrast    Narrative    CT CHEST/ABDOMEN/PELVIS WITH CONTRAST 3/7/2019 10:04 AM    TECHNIQUE: Images from thoracic inlet to pubic symphysis 97mL  Isovue-370 Radiation dose for this scan was reduced using automated  exposure control, " adjustment of the mA and/or kV according to patient  size, or iterative reconstruction technique.    HISTORY: Follow-up bladder cancer. Transurethral resection bladder  tumor 12/18/2012 ileal diversion.    COMPARISON: 2/2/2018 CT chest abdomen pelvis    FINDINGS:   Chest:  Redemonstrated loosely clustered calcified right upper lobe  nodules, calcified right hilar node and splenic calcifications  consistent with granulomatous disease. Few indeterminate subcentimeter  pulmonary nodules are not significantly changed such as 0.3 cm  subpleural anterior right upper lobe nodule image 22 series 3 and 0.5  cm nodular opacity along right minor fissure. No new or enlarging  nodules or acute pulmonary disease.    Abdomen and Pelvis: No focal liver lesions, slightly nodular liver  margin redemonstrated raising the question of a cirrhotic liver  contour. Normal-appearing gallbladder, pancreas, adrenal glands and  kidneys. Stable mildly prominent lymph nodes in the gastrohepatic  ligament. Nonenlarged retroperitoneal nodes. No pelvic adenopathy.    Cystectomy with right ileal loop diversion. No free fluid. No acute  bowel abnormality or aggressive bone lesions. Stable mild sclerosis of  mid thoracic vertebral bodies. Moderate aorto iliac atherosclerotic  calcification without aneurysm.      Impression    IMPRESSION:  1. Few small pulmonary nodules are indeterminate but stable since  2/2/2018, and previously described as stable since 2/1/2017.  2. Redemonstrated mild nodularity of liver margin raising the question  of cirrhosis.  3. No acute abnormality or convincing evidence for metastatic disease.  Mildly prominent gastrohepatic ligament nodes are redemonstrated and  stable in appearance.    KARL KELLY MD       ECOG PS: 1     ASSESSMENT   Mr. Benitez is a pleasant 54-year-old male with diagnosis of transitional cell carcinoma of the bladder at least T2 NXMX s/p complete cystectomy with ileal diversion.  CT scan results  1/12/16 showed no evidence of recurrence.  Switched to yearly CT scan 1/2016.  [facility] continues to [facility]     - Labs were reviewed with the patient- stable. Urine cytology is negative.   - He is on B12 supplementation.   - CT scan 3/7/19 results were reviewed with the patient- without any evidence of disease  He has completed 5 years of surveillance.     Follow up with Dr. Ceballos  Continue vitamin B12    Constanza Mccarty MD  3/11/2019      CC Von Ceballos MD

## 2019-03-11 NOTE — NURSING NOTE
"Oncology Rooming Note    March 11, 2019 9:01 AM   Casey Benitez is a 54 year old male who presents for:    Chief Complaint   Patient presents with     Oncology Clinic Visit     Malignant neoplasm of trigone of urinary bladder      Initial Vitals: /63   Pulse 50   Temp 96.8  F (36  C) (Tympanic)   Resp 16   Ht 1.778 m (5' 10\")   Wt 85.7 kg (189 lb)   SpO2 99%   BMI 27.12 kg/m   Estimated body mass index is 27.12 kg/m  as calculated from the following:    Height as of this encounter: 1.778 m (5' 10\").    Weight as of this encounter: 85.7 kg (189 lb). Body surface area is 2.06 meters squared.  No Pain (0) Comment: Data Unavailable   No LMP for male patient.  Allergies reviewed: Yes  Medications reviewed: Yes    Medications: Medication refills not needed today.  Pharmacy name entered into [employer]:    [facility] [address] [place] [facility] [address] [facility] [place] PHARMACY [facility] [address]    Clinical concerns: Follow Up       Amisha Betancur CMA              "

## 2019-03-11 NOTE — LETTER
3/11/2019         RE: Casey Benitez  [address] [place] [address]        Dear Colleague,    Thank you for referring your patient, Casey Benitez, to the [facility]. Please see a copy of my visit note below.    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  He completed cycle 4 on 5/28/2013. He had some treatment delays due to thrombocytopenia.  He underwent surgery on 7/30 final pathology revealed a T3N0MX tumor. His CT showed a paratrachael node (left at 1.1 cm), concern for metastatic disease, therefore a PET was obtained which did not show this node being metabolically active.    SUBJECTIVE   Patient comes in for a follow up today. He has been feeling well. No new symptoms.   HEMATOLOGY ONCOLOGY HISTORY   In April of 2012, Casey noticed some blood in his urine which was confirmed by a UA. He was treated for urinary tract infection and failed to followup until November of this year he saw Dr. Zee.  Due to persistent painless hematuria, cystoscopy was obtained which revealed a large bladder tumor. Final pathology revealed a grade 3 PT2 muscle invasive transitional cell carcinoma of the bladder. During his initial visit, we discussed starting treatment in the neoadjuvant fashion with cisplatin and gemcitabine and the rationale behind the drugs and treatment.  Casey had his mediport placed. We obtained metastatic workup with CXR and a bone scan (rib pain) which did not reveal any evidence of metastatic disease  Casey started Cisplatin and Gemcitabine on 1/15/2013.  He completed cycle 4 on 5/28/2013. He had some treatment delays due to thrombocytopenia.  He feels like his Energy has improved. He underwent surgery on 7/30 final  "pathology revealed a T3N0MX tumor. He is feeling well after the surgery   His CT showed a paratrachael node (left at 1.1 cm), concern for metastatic disease, therefore a PET was obtained which did not show this node being metabolically active     REVIEW OF SYSTEMS   As above in the HPI, o/w a complete ROS was negative.    Past medical, social histories reviewed.    Meds- Reviewed.     PHYSICAL EXAM   /63   Pulse 50   Temp 96.8  F (36  C) (Tympanic)   Resp 16   Ht 1.778 m (5' 10\")   Wt 85.7 kg (189 lb)   SpO2 99%   BMI 27.12 kg/m     CONSTITUTIONAL: Sitting comfortably.   HEENT: Pupils are equal. Oropharynx is clear.   NECK: No cervical or supraclavicular lymphadenopathy.   RESPIRATORY: Clear bilaterally.   CARD/VASC: S1, S2, regular.   GI: Soft, nontender, nondistended, no hepatosplenomegaly.   MUSKULOSKELETAL: Warm, well perfused.   NEUROLOGIC: Alert, awake.   INTEGUMENT: No rash.   LYMPHATICS: No edema.   PSYCH: Mood and affect was normal.    LABORATORY DATA AND IMAGING REVIEWED DURING THIS VISIT:  Recent Labs   Lab Test 03/07/19  0902 02/02/18  1115  05/06/13  0840  04/01/13  0935    137   < > 137   < > 138   POTASSIUM 3.6 4.0   < > 3.8   < > 4.1   CHLORIDE 105 103   < > 96   < > 100   CO2 27 29   < > 27   < > 30   ANIONGAP 6 5   < > 13.6   < > 9   BUN 15 13   < > 8   < > 9   CR 0.86 0.84   < > 0.82   < > 0.83   GLC 97 103*   < > 173*   < > 96   KIRBY 9.1 9.2   < > 9.1   < > 9.2   MAG  --   --   --  1.7  --  2.0    < > = values in this interval not displayed.     Recent Labs   Lab Test 03/07/19  0902 10/04/18  1508 09/26/18  1518   WBC 10.2 9.5 9.2   HGB 15.1 13.4 13.8    185 194   MCV 97 95 94   NEUTROPHIL 53.7 48.3 53.3     Recent Labs   Lab Test 03/07/19  0902 02/02/18  1115 07/31/17  1010   BILITOTAL 0.7 0.5 0.6   ALKPHOS 72 77 70   ALT 39 44 44   AST 25 31 40   ALBUMIN 4.1 4.0 3.8         Results for orders placed or performed during the hospital encounter of 03/07/19   CT " Chest/Abdomen/Pelvis w Contrast    Narrative    CT CHEST/ABDOMEN/PELVIS WITH CONTRAST 3/7/2019 10:04 AM    TECHNIQUE: Images from thoracic inlet to pubic symphysis 97mL  Isovue-370 Radiation dose for this scan was reduced using automated  exposure control, adjustment of the mA and/or kV according to patient  size, or iterative reconstruction technique.    HISTORY: Follow-up bladder cancer. Transurethral resection bladder  tumor 12/18/2012 ileal diversion.    COMPARISON: 2/2/2018 CT chest abdomen pelvis    FINDINGS:   Chest:  Redemonstrated loosely clustered calcified right upper lobe  nodules, calcified right hilar node and splenic calcifications  consistent with granulomatous disease. Few indeterminate subcentimeter  pulmonary nodules are not significantly changed such as 0.3 cm  subpleural anterior right upper lobe nodule image 22 series 3 and 0.5  cm nodular opacity along right minor fissure. No new or enlarging  nodules or acute pulmonary disease.    Abdomen and Pelvis: No focal liver lesions, slightly nodular liver  margin redemonstrated raising the question of a cirrhotic liver  contour. Normal-appearing gallbladder, pancreas, adrenal glands and  kidneys. Stable mildly prominent lymph nodes in the gastrohepatic  ligament. Nonenlarged retroperitoneal nodes. No pelvic adenopathy.    Cystectomy with right ileal loop diversion. No free fluid. No acute  bowel abnormality or aggressive bone lesions. Stable mild sclerosis of  mid thoracic vertebral bodies. Moderate aorto iliac atherosclerotic  calcification without aneurysm.      Impression    IMPRESSION:  1. Few small pulmonary nodules are indeterminate but stable since  2/2/2018, and previously described as stable since 2/1/2017.  2. Redemonstrated mild nodularity of liver margin raising the question  of cirrhosis.  3. No acute abnormality or convincing evidence for metastatic disease.  Mildly prominent gastrohepatic ligament nodes are redemonstrated and  stable in  appearance.    KARL KELLY MD       ECOG PS: 1     ASSESSMENT   Mr. Benitez is a pleasant 54-year-old male with diagnosis of transitional cell carcinoma of the bladder at least T2 NXMX s/p complete cystectomy with ileal diversion.  CT scan results 1/12/16 showed no evidence of recurrence.  Switched to yearly CT scan 1/2016.  He continues to follow up with [facility]     - Labs were reviewed with the patient- stable. Urine cytology is negative.   - He is on B12 supplementation.   - CT scan 3/7/19 results were reviewed with the patient- without any evidence of disease  He has completed 5 years of surveillance.     Follow up with Dr. Ceballos  Continue vitamin B12    Constanza Mccarty MD  3/11/2019      CC Von Ceballos MD    Again, thank you for allowing me to participate in the care of your patient.        Sincerely,        Constanza Mccarty MD

## 2019-04-12 ENCOUNTER — TRANSFERRED RECORDS (OUTPATIENT)
Dept: HEALTH INFORMATION MANAGEMENT | Facility: CLINIC | Age: 54
End: 2019-04-12

## 2019-05-23 ENCOUNTER — MYC REFILL (OUTPATIENT)
Dept: FAMILY MEDICINE | Facility: CLINIC | Age: 54
End: 2019-05-23

## 2019-05-23 DIAGNOSIS — I10 HYPERTENSION GOAL BP (BLOOD PRESSURE) < 140/90: ICD-10-CM

## 2019-05-23 DIAGNOSIS — I10 ESSENTIAL HYPERTENSION WITH GOAL BLOOD PRESSURE LESS THAN 140/90: ICD-10-CM

## 2019-05-24 RX ORDER — HYDROCHLOROTHIAZIDE 12.5 MG/1
12.5 TABLET ORAL DAILY
Qty: 90 TABLET | Refills: 0 | Status: SHIPPED | OUTPATIENT
Start: 2019-05-24 | End: 2019-06-13

## 2019-05-24 RX ORDER — ATENOLOL 100 MG/1
100 TABLET ORAL DAILY
Qty: 90 TABLET | Refills: 0 | Status: SHIPPED | OUTPATIENT
Start: 2019-05-24 | End: 2019-06-13

## 2019-05-24 RX ORDER — LISINOPRIL 20 MG/1
30 TABLET ORAL DAILY
Qty: 135 TABLET | Refills: 0 | Status: SHIPPED | OUTPATIENT
Start: 2019-05-24 | End: 2019-06-13

## 2019-05-24 NOTE — TELEPHONE ENCOUNTER
"Requested Prescriptions   Pending Prescriptions Disp Refills     atenolol (TENORMIN) 100 MG tablet 90 tablet 1     Sig: Take 1 tablet (100 mg) by mouth daily       Beta-Blockers Protocol Passed - 5/23/2019  7:05 PM        Passed - Blood pressure under 140/90 in past 12 months     BP Readings from Last 3 Encounters:   03/11/19 109/63   10/04/18 123/70   09/26/18 130/70                 Passed - Patient is age 6 or older        Passed - Recent (12 mo) or future (30 days) visit within the authorizing provider's specialty     Patient had office visit in the last 12 months or has a visit in the next 30 days with authorizing provider or within the authorizing provider's specialty.  See \"Patient Info\" tab in inbasket, or \"Choose Columns\" in Meds & Orders section of the refill encounter.              Passed - Medication is active on med list        hydrochlorothiazide (HYDRODIURIL) 12.5 MG tablet 90 tablet 1     Sig: Take 1 tablet (12.5 mg) by mouth daily       Diuretics (Including Combos) Protocol Passed - 5/23/2019  7:05 PM        Passed - Blood pressure under 140/90 in past 12 months     BP Readings from Last 3 Encounters:   03/11/19 109/63   10/04/18 123/70   09/26/18 130/70                 Passed - Recent (12 mo) or future (30 days) visit within the authorizing provider's specialty     Patient had office visit in the last 12 months or has a visit in the next 30 days with authorizing provider or within the authorizing provider's specialty.  See \"Patient Info\" tab in inbasket, or \"Choose Columns\" in Meds & Orders section of the refill encounter.              Passed - Medication is active on med list        Passed - Patient is age 18 or older        Passed - Normal serum creatinine on file in past 12 months     Recent Labs   Lab Test 03/07/19  0902   CR 0.86              Passed - Normal serum potassium on file in past 12 months     Recent Labs   Lab Test 03/07/19  0902   POTASSIUM 3.6                    Passed - Normal " "serum sodium on file in past 12 months     Recent Labs   Lab Test 03/07/19  0902                 lisinopril (PRINIVIL/ZESTRIL) 20 MG tablet 135 tablet 1     Sig: Take 1.5 tablets (30 mg) by mouth daily       ACE Inhibitors (Including Combos) Protocol Passed - 5/23/2019  7:05 PM        Passed - Blood pressure under 140/90 in past 12 months     BP Readings from Last 3 Encounters:   03/11/19 109/63   10/04/18 123/70   09/26/18 130/70                 Passed - Recent (12 mo) or future (30 days) visit within the authorizing provider's specialty     Patient had office visit in the last 12 months or has a visit in the next 30 days with authorizing provider or within the authorizing provider's specialty.  See \"Patient Info\" tab in inbasket, or \"Choose Columns\" in Meds & Orders section of the refill encounter.              Passed - Medication is active on med list        Passed - Patient is age 18 or older        Passed - Normal serum creatinine on file in past 12 months     Recent Labs   Lab Test 03/07/19  0902   CR 0.86             Passed - Normal serum potassium on file in past 12 months     Recent Labs   Lab Test 03/07/19  0902   POTASSIUM 3.6             "

## 2019-05-24 NOTE — TELEPHONE ENCOUNTER
Prescription approved per [facility] Refill Protocol.  For 30 day fill -  Pt does have OV with PCP for 6/13    Maria Esther Casillas RN

## 2019-06-13 ENCOUNTER — OFFICE VISIT (OUTPATIENT)
Dept: FAMILY MEDICINE | Facility: CLINIC | Age: 54
End: 2019-06-13
Payer: COMMERCIAL

## 2019-06-13 VITALS
RESPIRATION RATE: 16 BRPM | SYSTOLIC BLOOD PRESSURE: 108 MMHG | DIASTOLIC BLOOD PRESSURE: 62 MMHG | HEART RATE: 51 BPM | TEMPERATURE: 97.7 F | WEIGHT: 185.5 LBS | BODY MASS INDEX: 26.56 KG/M2 | HEIGHT: 70 IN | OXYGEN SATURATION: 99 %

## 2019-06-13 DIAGNOSIS — I10 HYPERTENSION GOAL BP (BLOOD PRESSURE) < 140/90: ICD-10-CM

## 2019-06-13 DIAGNOSIS — I10 ESSENTIAL HYPERTENSION WITH GOAL BLOOD PRESSURE LESS THAN 140/90: ICD-10-CM

## 2019-06-13 DIAGNOSIS — J41.0 SIMPLE CHRONIC BRONCHITIS (H): ICD-10-CM

## 2019-06-13 DIAGNOSIS — Z00.00 ROUTINE GENERAL MEDICAL EXAMINATION AT A HEALTH CARE FACILITY: Primary | ICD-10-CM

## 2019-06-13 LAB
CHOLEST SERPL-MCNC: 202 MG/DL
HDLC SERPL-MCNC: 43 MG/DL
LDLC SERPL CALC-MCNC: 133 MG/DL
NONHDLC SERPL-MCNC: 159 MG/DL
TRIGL SERPL-MCNC: 130 MG/DL

## 2019-06-13 PROCEDURE — 80061 LIPID PANEL: CPT | Performed by: PHYSICIAN ASSISTANT

## 2019-06-13 PROCEDURE — 99396 PREV VISIT EST AGE 40-64: CPT | Performed by: PHYSICIAN ASSISTANT

## 2019-06-13 PROCEDURE — 36415 COLL VENOUS BLD VENIPUNCTURE: CPT | Performed by: PHYSICIAN ASSISTANT

## 2019-06-13 RX ORDER — ATENOLOL 100 MG/1
100 TABLET ORAL DAILY
Qty: 90 TABLET | Refills: 3 | Status: SHIPPED | OUTPATIENT
Start: 2019-06-13 | End: 2020-04-07

## 2019-06-13 RX ORDER — HYDROCHLOROTHIAZIDE 12.5 MG/1
12.5 TABLET ORAL DAILY
Qty: 90 TABLET | Refills: 3 | Status: SHIPPED | OUTPATIENT
Start: 2019-06-13 | End: 2020-04-07

## 2019-06-13 RX ORDER — BUDESONIDE AND FORMOTEROL FUMARATE DIHYDRATE 160; 4.5 UG/1; UG/1
2 AEROSOL RESPIRATORY (INHALATION) 2 TIMES DAILY PRN
Qty: 3 INHALER | Refills: 2 | Status: SHIPPED | OUTPATIENT
Start: 2019-06-13 | End: 2020-04-07

## 2019-06-13 RX ORDER — ISOPROPYL ALCOHOL 0.75 G/1
SWAB TOPICAL
COMMUNITY
Start: 2019-04-15

## 2019-06-13 RX ORDER — LISINOPRIL 20 MG/1
30 TABLET ORAL DAILY
Qty: 135 TABLET | Refills: 3 | Status: SHIPPED | OUTPATIENT
Start: 2019-06-13 | End: 2020-04-07

## 2019-06-13 RX ORDER — PEN NEEDLE, DIABETIC 29 G X1/2"
NEEDLE, DISPOSABLE MISCELLANEOUS
COMMUNITY
Start: 2019-04-15

## 2019-06-13 ASSESSMENT — ENCOUNTER SYMPTOMS
HEMATOCHEZIA: 0
CHILLS: 0
CONSTIPATION: 0
ABDOMINAL PAIN: 0
COUGH: 0
HEMATURIA: 0

## 2019-06-13 ASSESSMENT — MIFFLIN-ST. JEOR: SCORE: 1677.67

## 2019-06-13 NOTE — PROGRESS NOTES
SUBJECTIVE:   CC: Casey Benitez is an 54 year old male who presents for preventative health visit.     Healthy Habits:     Getting at least 3 servings of Calcium per day:  Yes    Bi-annual eye exam:  Yes    Dental care twice a year:  NO    Sleep apnea or symptoms of sleep apnea:  None    Diet:  Regular (no restrictions)    Frequency of exercise:  2-3 days/week    Duration of exercise:  45-60 minutes    Taking medications regularly:  Yes    Medication side effects:  None    PHQ-2 Total Score: 0    Additional concerns today:  No              Today's PHQ-2 Score:   PHQ-2 (  [employer]) 2019   Q1: Little interest or pleasure in doing things 0   Q2: Feeling down, depressed or hopeless 0   PHQ-2 Score 0   Q1: Little interest or pleasure in doing things Not at all   Q2: Feeling down, depressed or hopeless Not at all   PHQ-2 Score 0       Abuse: Current or Past(Physical, Sexual or Emotional)- No  Do you feel safe in your environment? Yes    Social History     Tobacco Use     Smoking status: Former Smoker     Packs/day: 0.50     Years: 20.00     Pack years: 10.00     Types: Cigarettes     Start date: 1992     Last attempt to quit: 2012     Years since quittin.4     Smokeless tobacco: Never Used   Substance Use Topics     Alcohol use: No     Comment: stopped drinking          Alcohol Use 2019   Prescreen: >3 drinks/day or >7 drinks/week? No   Prescreen: >3 drinks/day or >7 drinks/week? -       Last PSA:   PSA   Date Value Ref Range Status   2009 0.24 0 - 4 ug/L Final       Reviewed orders with patient. Reviewed health maintenance and updated orders accordingly - Yes  BP Readings from Last 3 Encounters:   19 108/62   19 109/63   10/04/18 123/70    Wt Readings from Last 3 Encounters:   19 84.1 kg (185 lb 8 oz)   19 85.7 kg (189 lb)   10/04/18 90.7 kg (199 lb 14.4 oz)                  Current Outpatient Medications   Medication Sig Dispense Refill     atenolol (TENORMIN) 100  "MG tablet Take 1 tablet (100 mg) by mouth daily 90 tablet 3     budesonide-formoterol (SYMBICORT) 160-4.5 MCG/ACT Inhaler Inhale 2 puffs into the lungs 2 times daily as needed 3 Inhaler 2     cyabnocobalamin (VITAMIN B-12) 2500 MCG sublingual tablet Place 2,500 mcg under the tongue daily 30 tablet 3     hydrochlorothiazide (HYDRODIURIL) 12.5 MG tablet Take 1 tablet (12.5 mg) by mouth daily 90 tablet 3     levofloxacin (LEVAQUIN) 500 MG tablet Take 1 tablet (500 mg) by mouth daily 10 tablet 0     lisinopril (PRINIVIL/ZESTRIL) 20 MG tablet Take 1.5 tablets (30 mg) by mouth daily 135 tablet 3     LORazepam (ATIVAN) 1 MG tablet Take 0.5 tablets (0.5 mg) by mouth nightly as needed for anxiety 30 tablet 0     Alcohol Swabs (B-D SINGLE USE SWABS REGULAR) PADS        BD INSULIN SYRINGE U/F 30G X 1/2\" 0.5 ML miscellaneous        Recent Labs   Lab Test 03/07/19  0902 02/02/18  1115 07/31/17  1010  05/13/15  0959  11/12/12  1300  11/30/11  1413 11/25/11  0957   A1C  --   --   --   --   --   --   --   --  5.2  --    LDL  --   --   --   --  102  --  159*  --   --  141*   HDL  --   --   --   --  50  --  41  --   --  41   TRIG  --   --   --   --  104  --  172*  --   --  183*   ALT 39 44 44   < > 58   < > 62  --   --  73*   CR 0.86 0.84 0.84   < > 0.83   < > 0.68   < >  --  0.84   GFRESTIMATED >90 >90 >90  Non African American GFR Calc     < > >90  Non  GFR Calc     < > >90   < >  --  >90   GFRESTBLACK >90 >90 >90  African American GFR Calc     < > >90   GFR Calc     < > >90   < >  --  >90   POTASSIUM 3.6 4.0 4.3   < > 4.3   < > 4.4   < >  --  4.4   TSH  --   --   --   --   --   --  4.88  --   --   --     < > = values in this interval not displayed.        Reviewed and updated as needed this visit by clinical staff         Reviewed and updated as needed this visit by Provider            Review of Systems   Constitutional: Negative for chills.   HENT: Negative for congestion.    Respiratory: Negative " for cough.    Cardiovascular: Negative for chest pain.   Gastrointestinal: Negative for abdominal pain, constipation and hematochezia.   Genitourinary: Negative for hematuria.     CONSTITUTIONAL: NEGATIVE for fever, chills, change in weight  INTEGUMENTARY/SKIN: NEGATIVE for worrisome rashes, moles or lesions  EYES: NEGATIVE for vision changes or irritation  ENT: NEGATIVE for ear, mouth and throat problems  RESP: NEGATIVE for significant cough or SOB  CV: NEGATIVE for chest pain, palpitations or peripheral edema  GI: NEGATIVE for nausea, abdominal pain, heartburn, or change in bowel habits   male: negative for dysuria, hematuria, decreased urinary stream, erectile dysfunction, urethral discharge  MUSCULOSKELETAL: NEGATIVE for significant arthralgias or myalgia  NEURO: NEGATIVE for weakness, dizziness or paresthesias  PSYCHIATRIC: NEGATIVE for changes in mood or affect    OBJECTIVE:   There were no vitals taken for this visit.    Physical Exam  GENERAL: healthy, alert and no distress  EYES: Eyes grossly normal to inspection, PERRL and conjunctivae and sclerae normal  HENT: ear canals and TM's normal, nose and mouth without ulcers or lesions  NECK: no adenopathy, no asymmetry, masses, or scars and thyroid normal to palpation  RESP: lungs clear to auscultation - no rales, rhonchi or wheezes  CV: regular rate and rhythm, normal S1 S2, no S3 or S4, no murmur, click or rub, no peripheral edema and peripheral pulses strong  ABDOMEN: soft, nontender, no hepatosplenomegaly, no masses and bowel sounds normal  MS: no gross musculoskeletal defects noted, no edema  SKIN: no suspicious lesions or rashes  NEURO: Normal strength and tone, mentation intact and speech normal  PSYCH: mentation appears normal, affect normal/bright  LYMPH: no cervical, supraclavicular, axillary, or inguinal adenopathy    Diagnostic Test Results:  Labs reviewed in Epic    ASSESSMENT/PLAN:   1. Routine general medical examination at a [facility]  "facility      2. Essential hypertension with goal blood pressure less than 140/90  Stable   - Lipid panel reflex to direct LDL Fasting  - atenolol (TENORMIN) 100 MG tablet; Take 1 tablet (100 mg) by mouth daily  Dispense: 90 tablet; Refill: 3    3. Simple chronic bronchitis (H)  Stable   - budesonide-formoterol (SYMBICORT) 160-4.5 MCG/ACT Inhaler; Inhale 2 puffs into the lungs 2 times daily as needed  Dispense: 3 Inhaler; Refill: 2    4. Hypertension goal BP (blood pressure) < 140/90    - hydrochlorothiazide (HYDRODIURIL) 12.5 MG tablet; Take 1 tablet (12.5 mg) by mouth daily  Dispense: 90 tablet; Refill: 3  - lisinopril (PRINIVIL/ZESTRIL) 20 MG tablet; Take 1.5 tablets (30 mg) by mouth daily  Dispense: 135 tablet; Refill: 3    COUNSELING:   Reviewed preventive health counseling, as reflected in patient instructions       Regular exercise       Healthy diet/nutrition       Vision screening       Hearing screening       Aspirin Prophylaxsis    Estimated body mass index is 27.12 kg/m  as calculated from the following:    Height as of 3/11/19: 1.778 m (5' 10\").    Weight as of 3/11/19: 85.7 kg (189 lb).          reports that he quit smoking about 6 years ago. His smoking use included cigarettes. He started smoking about 27 years ago. He has a 10.00 pack-year smoking history. He has never used smokeless tobacco.      Counseling Resources:  ATP IV Guidelines  Pooled Cohorts Equation Calculator  FRAX Risk Assessment  ICSI Preventive Guidelines  Dietary Guidelines for Americans, 2010  USDA's MyPlate  ASA Prophylaxis  Lung CA Screening    Ramona Ann Aaseby-Aguilera, PA-C  [facility]  "

## 2019-09-30 ENCOUNTER — HEALTH MAINTENANCE LETTER (OUTPATIENT)
Age: 54
End: 2019-09-30

## 2020-07-17 DIAGNOSIS — I10 ESSENTIAL HYPERTENSION WITH GOAL BLOOD PRESSURE LESS THAN 140/90: ICD-10-CM

## 2020-07-17 DIAGNOSIS — I10 HYPERTENSION GOAL BP (BLOOD PRESSURE) < 140/90: ICD-10-CM

## 2020-07-20 NOTE — TELEPHONE ENCOUNTER
Routing refill request to provider for review/approval because:  Katt given x1 and patient did not follow up, please advise    Ok for another RF    LM for call back     Maria Esther Casillas RN

## 2020-07-21 RX ORDER — ATENOLOL 100 MG/1
TABLET ORAL
Qty: 30 TABLET | Refills: 0 | Status: SHIPPED | OUTPATIENT
Start: 2020-07-21 | End: 2020-08-29

## 2020-07-21 RX ORDER — LISINOPRIL 20 MG/1
TABLET ORAL
Qty: 45 TABLET | Refills: 0 | Status: SHIPPED | OUTPATIENT
Start: 2020-07-21 | End: 2020-08-29

## 2020-07-21 RX ORDER — HYDROCHLOROTHIAZIDE 12.5 MG/1
TABLET ORAL
Qty: 30 TABLET | Refills: 0 | Status: SHIPPED | OUTPATIENT
Start: 2020-07-21 | End: 2020-08-29

## 2020-08-18 ENCOUNTER — TRANSFERRED RECORDS (OUTPATIENT)
Dept: HEALTH INFORMATION MANAGEMENT | Facility: CLINIC | Age: 55
End: 2020-08-18

## 2020-08-29 ENCOUNTER — MYC REFILL (OUTPATIENT)
Dept: FAMILY MEDICINE | Facility: CLINIC | Age: 55
End: 2020-08-29

## 2020-08-29 DIAGNOSIS — I10 ESSENTIAL HYPERTENSION WITH GOAL BLOOD PRESSURE LESS THAN 140/90: ICD-10-CM

## 2020-08-29 DIAGNOSIS — I10 HYPERTENSION GOAL BP (BLOOD PRESSURE) < 140/90: ICD-10-CM

## 2020-08-31 RX ORDER — LISINOPRIL 20 MG/1
30 TABLET ORAL DAILY
Qty: 45 TABLET | Refills: 1 | Status: SHIPPED | OUTPATIENT
Start: 2020-08-31 | End: 2020-10-14

## 2020-08-31 RX ORDER — HYDROCHLOROTHIAZIDE 12.5 MG/1
12.5 TABLET ORAL DAILY
Qty: 30 TABLET | Refills: 1 | Status: SHIPPED | OUTPATIENT
Start: 2020-08-31 | End: 2020-10-14

## 2020-08-31 RX ORDER — ATENOLOL 100 MG/1
100 TABLET ORAL DAILY
Qty: 30 TABLET | Refills: 1 | Status: SHIPPED | OUTPATIENT
Start: 2020-08-31 | End: 2020-10-14

## 2020-08-31 NOTE — TELEPHONE ENCOUNTER
Routing refill request to provider for review/approval because:  Unable to RF per RN protocol as labs are not in ranage     Pt does have OV in October.  LOV was June 2019    Maria Esther Casillas RN

## 2020-10-14 ENCOUNTER — OFFICE VISIT (OUTPATIENT)
Dept: FAMILY MEDICINE | Facility: CLINIC | Age: 55
End: 2020-10-14
Payer: COMMERCIAL

## 2020-10-14 VITALS
WEIGHT: 187.6 LBS | OXYGEN SATURATION: 98 % | HEIGHT: 70 IN | TEMPERATURE: 97.8 F | BODY MASS INDEX: 26.86 KG/M2 | RESPIRATION RATE: 16 BRPM | DIASTOLIC BLOOD PRESSURE: 60 MMHG | HEART RATE: 48 BPM | SYSTOLIC BLOOD PRESSURE: 120 MMHG

## 2020-10-14 DIAGNOSIS — Z23 NEEDS FLU SHOT: ICD-10-CM

## 2020-10-14 DIAGNOSIS — L30.9 DERMATITIS: ICD-10-CM

## 2020-10-14 DIAGNOSIS — Z00.00 ROUTINE GENERAL MEDICAL EXAMINATION AT A HEALTH CARE FACILITY: ICD-10-CM

## 2020-10-14 DIAGNOSIS — I10 ESSENTIAL HYPERTENSION WITH GOAL BLOOD PRESSURE LESS THAN 140/90: Primary | ICD-10-CM

## 2020-10-14 DIAGNOSIS — J41.0 SIMPLE CHRONIC BRONCHITIS (H): ICD-10-CM

## 2020-10-14 LAB
ERYTHROCYTE [DISTWIDTH] IN BLOOD BY AUTOMATED COUNT: 12 % (ref 10–15)
HCT VFR BLD AUTO: 42.2 % (ref 40–53)
HGB BLD-MCNC: 14.4 G/DL (ref 13.3–17.7)
MCH RBC QN AUTO: 32.1 PG (ref 26.5–33)
MCHC RBC AUTO-ENTMCNC: 34.1 G/DL (ref 31.5–36.5)
MCV RBC AUTO: 94 FL (ref 78–100)
PLATELET # BLD AUTO: 213 10E9/L (ref 150–450)
RBC # BLD AUTO: 4.49 10E12/L (ref 4.4–5.9)
WBC # BLD AUTO: 9.2 10E9/L (ref 4–11)

## 2020-10-14 PROCEDURE — 90682 RIV4 VACC RECOMBINANT DNA IM: CPT | Performed by: PHYSICIAN ASSISTANT

## 2020-10-14 PROCEDURE — 36415 COLL VENOUS BLD VENIPUNCTURE: CPT | Performed by: PHYSICIAN ASSISTANT

## 2020-10-14 PROCEDURE — 80061 LIPID PANEL: CPT | Performed by: PHYSICIAN ASSISTANT

## 2020-10-14 PROCEDURE — 80053 COMPREHEN METABOLIC PANEL: CPT | Performed by: PHYSICIAN ASSISTANT

## 2020-10-14 PROCEDURE — 85027 COMPLETE CBC AUTOMATED: CPT | Performed by: PHYSICIAN ASSISTANT

## 2020-10-14 PROCEDURE — 99396 PREV VISIT EST AGE 40-64: CPT | Mod: 25 | Performed by: PHYSICIAN ASSISTANT

## 2020-10-14 PROCEDURE — 82043 UR ALBUMIN QUANTITATIVE: CPT | Performed by: PHYSICIAN ASSISTANT

## 2020-10-14 PROCEDURE — 90471 IMMUNIZATION ADMIN: CPT | Performed by: PHYSICIAN ASSISTANT

## 2020-10-14 RX ORDER — ATENOLOL 100 MG/1
100 TABLET ORAL DAILY
Qty: 90 TABLET | Refills: 3 | Status: SHIPPED | OUTPATIENT
Start: 2020-10-14 | End: 2021-10-27

## 2020-10-14 RX ORDER — TRIAMCINOLONE ACETONIDE 1 MG/G
CREAM TOPICAL 2 TIMES DAILY
Qty: 30 G | Refills: 0 | Status: SHIPPED | OUTPATIENT
Start: 2020-10-14 | End: 2024-06-25

## 2020-10-14 RX ORDER — BUDESONIDE AND FORMOTEROL FUMARATE DIHYDRATE 160; 4.5 UG/1; UG/1
2 AEROSOL RESPIRATORY (INHALATION) 2 TIMES DAILY PRN
Qty: 3 INHALER | Refills: 0 | Status: SHIPPED | OUTPATIENT
Start: 2020-10-14 | End: 2021-04-08

## 2020-10-14 RX ORDER — HYDROCHLOROTHIAZIDE 12.5 MG/1
12.5 TABLET ORAL DAILY
Qty: 90 TABLET | Refills: 3 | Status: SHIPPED | OUTPATIENT
Start: 2020-10-14 | End: 2021-10-27

## 2020-10-14 RX ORDER — LISINOPRIL 20 MG/1
30 TABLET ORAL DAILY
Qty: 135 TABLET | Refills: 3 | Status: SHIPPED | OUTPATIENT
Start: 2020-10-14 | End: 2021-10-27

## 2020-10-14 RX ORDER — PEN NEEDLE, DIABETIC 29 G X1/2"
NEEDLE, DISPOSABLE MISCELLANEOUS
COMMUNITY
Start: 2020-08-20 | End: 2021-04-08

## 2020-10-14 ASSESSMENT — MIFFLIN-ST. JEOR: SCORE: 1682.2

## 2020-10-14 NOTE — PROGRESS NOTES
3  SUBJECTIVE:   CC: Casey Benitez is an 55 year old male who presents for preventive health visit.       Patient has been advised of split billing requirements and indicates understanding: Yes  Healthy Habits:    Do you get at least three servings of calcium containing foods daily (dairy, green leafy vegetables, etc.)? yes    Amount of exercise or daily activities, outside of work: 2-3 day(s) per week    Problems taking medications regularly No    Medication side effects: No    Have you had an eye exam in the past two years? Yes     Do you see a dentist twice per year? no    Do you have sleep apnea, excessive snoring or daytime drowsiness?no    Future Appointments   Date Time Provider Department Center   10/14/2020 10:30 AM Aaseby-Aguilera, Ramona Ann, PA-C [facility]     Appointment Notes for this encounter:   Annual check up. Precription refill.    Health Maintenance Due   Topic Date Due     ANNUAL REVIEW OF HM ORDERS  1965     ADVANCE CARE PLANNING  1965     COPD ACTION PLAN  1965     HIV SCREENING  01/08/1978     ZOSTER IMMUNIZATION (1 of 2) 01/08/2013     MICROALBUMIN  01/29/2019     PHQ-2  01/01/2020     BMP  03/07/2020     INFLUENZA VACCINE (1) 09/01/2020     Health Maintenance addressed:  Flu Vaccine    Immunizations Pt will update today    MyChart Status:  Active and Using        Fasting, refills    Today's PHQ-2 Score:   PHQ-2 ( 1999 [employer]) 6/13/2019 2/6/2017   Q1: Little interest or pleasure in doing things 0 0   Q2: Feeling down, depressed or hopeless 0 0   PHQ-2 Score 0 0   Q1: Little interest or pleasure in doing things Not at all -   Q2: Feeling down, depressed or hopeless Not at all -   PHQ-2 Score 0 -       Abuse: Current or Past(Physical, Sexual or Emotional)- No  Do you feel safe in your environment? Yes    Have you ever done Advance Care Planning? (For example, a Health Directive, POLST, or a discussion with a medical provider or your loved ones about your wishes): No, advance  care planning information given to patient to review.  Patient plans to discuss their wishes with loved ones or provider.      Social History     Tobacco Use     Smoking status: Former Smoker     Packs/day: 0.50     Years: 20.00     Pack years: 10.00     Types: Cigarettes     Start date: 1992     Quit date: 2012     Years since quittin.8     Smokeless tobacco: Never Used   Substance Use Topics     Alcohol use: No     Comment: stopped drinking      If you drink alcohol do you typically have >3 drinks per day or >7 drinks per week? No                      Last PSA:   PSA   Date Value Ref Range Status   2009 0.24 0 - 4 ug/L Final       Reviewed orders with patient. Reviewed health maintenance and updated orders accordingly - Yes  BP Readings from Last 3 Encounters:   10/14/20 120/60   19 108/62   19 109/63    Wt Readings from Last 3 Encounters:   10/14/20 85.1 kg (187 lb 9.6 oz)   19 84.1 kg (185 lb 8 oz)   19 85.7 kg (189 lb)                    Reviewed and updated as needed this visit by clinical staff  Tobacco  Allergies  Meds   Med Hx  Surg Hx  Fam Hx  Soc Hx        Reviewed and updated as needed this visit by Provider                    ROS:  CONSTITUTIONAL: NEGATIVE for fever, chills, change in weight  INTEGUMENTARY/SKIN: NEGATIVE for worrisome rashes, moles or lesions  EYES: NEGATIVE for vision changes or irritation  ENT: NEGATIVE for ear, mouth and throat problems  RESP: NEGATIVE for significant cough or SOB  CV: NEGATIVE for chest pain, palpitations or peripheral edema  GI: NEGATIVE for nausea, abdominal pain, heartburn, or change in bowel habits   male: negative for dysuria, hematuria, decreased urinary stream, erectile dysfunction, urethral discharge  MUSCULOSKELETAL: NEGATIVE for significant arthralgias or myalgia  NEURO: NEGATIVE for weakness, dizziness or paresthesias  PSYCHIATRIC: NEGATIVE for changes in mood or affect    OBJECTIVE:   /60 (BP  "Location: Right arm, Patient Position: Chair, Cuff Size: Adult Regular)   Pulse (!) 48   Temp 97.8  F (36.6  C) (Oral)   Resp 16   Ht 1.778 m (5' 10\")   Wt 85.1 kg (187 lb 9.6 oz)   SpO2 98%   BMI 26.92 kg/m    EXAM:  GENERAL: healthy, alert and no distress  EYES: Eyes grossly normal to inspection, PERRL and conjunctivae and sclerae normal  HENT: ear canals and TM's normal, nose and mouth without ulcers or lesions  NECK: no adenopathy, no asymmetry, masses, or scars and thyroid normal to palpation  RESP: lungs clear to auscultation - no rales, rhonchi or wheezes  CV: regular rate and rhythm, normal S1 S2, no S3 or S4, no murmur, click or rub, no peripheral edema and peripheral pulses strong  ABDOMEN: soft, nontender, no hepatosplenomegaly, no masses and bowel sounds normal  MS: no gross musculoskeletal defects noted, no edema  SKIN: no suspicious lesions or rashes  NEURO: Normal strength and tone, mentation intact and speech normal  PSYCH: mentation appears normal, affect normal/bright  LYMPH: no cervical, supraclavicular, axillary, or inguinal adenopathy    Diagnostic Test Results:  Labs reviewed in Epic    ASSESSMENT/PLAN:   1. Routine general medical examination at a health care facility      2. Essential hypertension with goal blood pressure less than 140/90  syable   - Albumin Random Urine Quantitative with Creat Ratio  - CBC with platelets  - Comprehensive metabolic panel  - Lipid panel reflex to direct LDL Fasting  - atenolol (TENORMIN) 100 MG tablet; Take 1 tablet (100 mg) by mouth daily  Dispense: 90 tablet; Refill: 3    3. Simple chronic bronchitis (H)  Stable   - budesonide-formoterol (SYMBICORT) 160-4.5 MCG/ACT Inhaler; Inhale 2 puffs into the lungs 2 times daily as needed (with respiratory infections)  Dispense: 3 Inhaler; Refill: 0    4. Hypertension goal BP (blood pressure) < 140/90    - hydrochlorothiazide (HYDRODIURIL) 12.5 MG tablet; Take 1 tablet (12.5 mg) by mouth daily  Dispense: 90 " "tablet; Refill: 3  - lisinopril (ZESTRIL) 20 MG tablet; Take 1.5 tablets (30 mg) by mouth daily  Dispense: 135 tablet; Refill: 3    5. Needs flu shot    - C RIV4 (FLUBLOK) VACCINE RECOMBINANT DNA PRSRV ANTIBIO FREE, IM [[id number]]    6. Dermatitis    - triamcinolone (KENALOG) 0.1 % external cream; Apply topically 2 times daily  Dispense: 30 g; Refill: 0    Patient has been advised of split billing requirements and indicates understanding: Yes  COUNSELING:  Reviewed preventive health counseling, as reflected in patient instructions       Regular exercise       Healthy diet/nutrition       Vision screening       Hearing screening    Estimated body mass index is 26.92 kg/m  as calculated from the following:    Height as of this encounter: 1.778 m (5' 10\").    Weight as of this encounter: 85.1 kg (187 lb 9.6 oz).        He reports that he quit smoking about 7 years ago. His smoking use included cigarettes. He started smoking about 28 years ago. He has a 10.00 pack-year smoking history. He has never used smokeless tobacco.      Counseling Resources:  ATP IV Guidelines  Pooled Cohorts Equation Calculator  FRAX Risk Assessment  ICSI Preventive Guidelines  Dietary Guidelines for Americans, 2010  USDA's MyPlate  ASA Prophylaxis  Lung CA Screening    Ramona Ann Aaseby-Aguilera, PA-C [facility]  "

## 2020-10-15 LAB
ALBUMIN SERPL-MCNC: 3.9 G/DL (ref 3.4–5)
ALP SERPL-CCNC: 70 U/L (ref 40–150)
ALT SERPL W P-5'-P-CCNC: 40 U/L (ref 0–70)
ANION GAP SERPL CALCULATED.3IONS-SCNC: 5 MMOL/L (ref 3–14)
AST SERPL W P-5'-P-CCNC: 25 U/L (ref 0–45)
BILIRUB SERPL-MCNC: 0.9 MG/DL (ref 0.2–1.3)
BUN SERPL-MCNC: 18 MG/DL (ref 7–30)
CALCIUM SERPL-MCNC: 9 MG/DL (ref 8.5–10.1)
CHLORIDE SERPL-SCNC: 104 MMOL/L (ref 94–109)
CHOLEST SERPL-MCNC: 232 MG/DL
CO2 SERPL-SCNC: 26 MMOL/L (ref 20–32)
CREAT SERPL-MCNC: 0.77 MG/DL (ref 0.66–1.25)
CREAT UR-MCNC: 71 MG/DL
GFR SERPL CREATININE-BSD FRML MDRD: >90 ML/MIN/{1.73_M2}
GLUCOSE SERPL-MCNC: 91 MG/DL (ref 70–99)
HDLC SERPL-MCNC: 41 MG/DL
LDLC SERPL CALC-MCNC: 165 MG/DL
MICROALBUMIN UR-MCNC: 52 MG/L
MICROALBUMIN/CREAT UR: 72.89 MG/G CR (ref 0–17)
NONHDLC SERPL-MCNC: 191 MG/DL
POTASSIUM SERPL-SCNC: 4.1 MMOL/L (ref 3.4–5.3)
PROT SERPL-MCNC: 8 G/DL (ref 6.8–8.8)
SODIUM SERPL-SCNC: 135 MMOL/L (ref 133–144)
TRIGL SERPL-MCNC: 132 MG/DL

## 2021-04-01 ENCOUNTER — IMMUNIZATION (OUTPATIENT)
Dept: FAMILY MEDICINE | Facility: CLINIC | Age: 56
End: 2021-04-01
Payer: COMMERCIAL

## 2021-04-01 PROCEDURE — 91301 PR COVID VAC MODERNA 100 MCG/0.5 ML IM: CPT

## 2021-04-01 PROCEDURE — 0011A PR COVID VAC MODERNA 100 MCG/0.5 ML IM: CPT

## 2021-04-08 ENCOUNTER — OFFICE VISIT (OUTPATIENT)
Dept: FAMILY MEDICINE | Facility: CLINIC | Age: 56
End: 2021-04-08
Payer: COMMERCIAL

## 2021-04-08 ENCOUNTER — ANCILLARY PROCEDURE (OUTPATIENT)
Dept: GENERAL RADIOLOGY | Facility: CLINIC | Age: 56
End: 2021-04-08
Attending: PHYSICIAN ASSISTANT
Payer: COMMERCIAL

## 2021-04-08 VITALS
WEIGHT: 195.7 LBS | TEMPERATURE: 98.2 F | BODY MASS INDEX: 28.02 KG/M2 | OXYGEN SATURATION: 96 % | DIASTOLIC BLOOD PRESSURE: 70 MMHG | HEART RATE: 55 BPM | RESPIRATION RATE: 18 BRPM | HEIGHT: 70 IN | SYSTOLIC BLOOD PRESSURE: 128 MMHG

## 2021-04-08 DIAGNOSIS — M54.9 UPPER BACK PAIN: ICD-10-CM

## 2021-04-08 DIAGNOSIS — R20.0 LEFT UPPER EXTREMITY NUMBNESS: ICD-10-CM

## 2021-04-08 DIAGNOSIS — M54.2 NECK PAIN: ICD-10-CM

## 2021-04-08 DIAGNOSIS — M54.2 NECK PAIN: Primary | ICD-10-CM

## 2021-04-08 PROCEDURE — 99214 OFFICE O/P EST MOD 30 MIN: CPT | Performed by: PHYSICIAN ASSISTANT

## 2021-04-08 PROCEDURE — 72040 X-RAY EXAM NECK SPINE 2-3 VW: CPT | Performed by: RADIOLOGY

## 2021-04-08 PROCEDURE — 72070 X-RAY EXAM THORAC SPINE 2VWS: CPT | Performed by: RADIOLOGY

## 2021-04-08 RX ORDER — NAPROXEN 500 MG/1
500 TABLET ORAL 2 TIMES DAILY WITH MEALS
Qty: 28 TABLET | Refills: 0 | Status: SHIPPED | OUTPATIENT
Start: 2021-04-08 | End: 2021-04-22

## 2021-04-08 RX ORDER — CYCLOBENZAPRINE HCL 10 MG
10 TABLET ORAL 3 TIMES DAILY PRN
Qty: 30 TABLET | Refills: 0 | Status: SHIPPED | OUTPATIENT
Start: 2021-04-08 | End: 2022-05-30

## 2021-04-08 ASSESSMENT — MIFFLIN-ST. JEOR: SCORE: 1713.94

## 2021-04-08 NOTE — PROGRESS NOTES
"    Assessment & Plan     Neck pain  Advised follow-up with spine specialist.   - XR Cervical Spine 2/3 Views; Future  - XR Thoracic Spine 2 Views; Future  - Orthopedic & Spine  Referral; Future  - naproxen (NAPROSYN) 500 MG tablet; Take 1 tablet (500 mg) by mouth 2 times daily (with meals) for 14 days  - cyclobenzaprine (FLEXERIL) 10 MG tablet; Take 1 tablet (10 mg) by mouth 3 times daily as needed for muscle spasms    Upper back pain    - XR Cervical Spine 2/3 Views; Future  - XR Thoracic Spine 2 Views; Future  - Orthopedic & Spine  Referral; Future    Left upper extremity numbness    - XR Cervical Spine 2/3 Views; Future  - XR Thoracic Spine 2 Views; Future  - Orthopedic & Spine  Referral; Future      BMI:   Estimated body mass index is 28.08 kg/m  as calculated from the following:    Height as of this encounter: 1.778 m (5' 10\").    Weight as of this encounter: 88.8 kg (195 lb 11.2 oz).         Ramona Ann Aaseby-Aguilera, PA-C  [facility] FRANCISCO JAVIER Peña is a 56 year old who presents for the following health issues     HPI     Neck Pain  Onset/Duration: few months ago   Description:   Location: left side of neck   Radiation: into the left neck and nto the left shoulder  Intensity: 5/10  Progression of Symptoms:  intermittent  Accompanying Signs & Symptoms:  Burning, tingling, prickly sensation in arm(s): no  Numbness in arm(s): YES - left   Weakness in arm(s):  YES - left   Fever: no  Headache: YES  Nausea and/or vomiting: YES  History:   Trauma: no  Previous neck pain: no  Previous surgery or injections: no  Previous Imaging (MRI,X ray): no  Precipitating or alleviating factors: laying down - pushing up the arms   Does movement impact the pain:  YES - looking left is worse   Therapies tried and outcome:       Review of Systems   Constitutional, HEENT, cardiovascular, pulmonary, gi and gu systems are negative, except as otherwise noted.      Objective  " "  /70 (BP Location: Right arm, Patient Position: Chair, Cuff Size: Adult Large)   Pulse 55   Temp 98.2  F (36.8  C) (Oral)   Resp 18   Ht 1.778 m (5' 10\")   Wt 88.8 kg (195 lb 11.2 oz)   SpO2 96%   BMI 28.08 kg/m    Body mass index is 28.08 kg/m .  Physical Exam   GENERAL: healthy, alert and no distress  RESP: lungs clear to auscultation - no rales, rhonchi or wheezes  CV: regular rate and rhythm, normal S1 S2, no S3 or S4, no murmur, click or rub, no peripheral edema and peripheral pulses strong  MS: no gross musculoskeletal defects noted, no edema  Cervical spine: TTP over lower cervical.  Shoulder: full non painful rom        "

## 2021-04-08 NOTE — PATIENT INSTRUCTIONS
(M54.2) Neck pain  (primary encounter diagnosis)  Comment:   Plan: XR Cervical Spine 2/3 Views, XR Thoracic Spine         2 Views, Orthopedic & Spine  Referral,        naproxen (NAPROSYN) 500 MG tablet,         cyclobenzaprine (FLEXERIL) 10 MG tablet            (M54.9) Upper back pain  Comment:   Plan: XR Cervical Spine 2/3 Views, XR Thoracic Spine         2 Views, Orthopedic & Spine  Referral            (R20.0) Left upper extremity numbness  Comment:   Plan: XR Cervical Spine 2/3 Views, XR Thoracic Spine         2 Views, Orthopedic & Spine  Referral

## 2021-04-22 ENCOUNTER — OFFICE VISIT (OUTPATIENT)
Dept: PALLIATIVE MEDICINE | Facility: CLINIC | Age: 56
End: 2021-04-22
Payer: COMMERCIAL

## 2021-04-22 VITALS — DIASTOLIC BLOOD PRESSURE: 88 MMHG | OXYGEN SATURATION: 97 % | SYSTOLIC BLOOD PRESSURE: 167 MMHG | HEART RATE: 61 BPM

## 2021-04-22 DIAGNOSIS — Z51.5 PALLIATIVE CARE ENCOUNTER: ICD-10-CM

## 2021-04-22 DIAGNOSIS — M47.812 CERVICAL FACET SYNDROME: Primary | ICD-10-CM

## 2021-04-22 DIAGNOSIS — G89.29 CHRONIC NECK PAIN: ICD-10-CM

## 2021-04-22 DIAGNOSIS — M54.2 CHRONIC NECK PAIN: ICD-10-CM

## 2021-04-22 DIAGNOSIS — M79.10 TRIGGER POINT: ICD-10-CM

## 2021-04-22 PROCEDURE — 99203 OFFICE O/P NEW LOW 30 MIN: CPT | Performed by: PHYSICAL MEDICINE & REHABILITATION

## 2021-04-22 ASSESSMENT — PAIN SCALES - GENERAL: PAINLEVEL: MODERATE PAIN (4)

## 2021-04-22 NOTE — PATIENT INSTRUCTIONS
1. I ordered physical therapy, you'll be called to schedule.    2. Call the number below to schedule a 6-8 week virtual follow-up visit.    3. Work with PT for the next 6-8 weeks, if no improvement we'll discuss getting an MRI at your follow-up. If you are feeling good, then you can cancel the follow-up appointment.      DO [facility]        ----------------------------------------------------------------  Clinic Number:  [phone]     Call with any questions about your care and for scheduling assistance.     Calls are returned Monday through Friday between 8 AM and 4:30 PM. We usually get back to you within 2 business days depending on the issue/request.    If we are prescribing your medications:    For opioid medication refills, call the clinic or send a [employer] message 7 days in advance.  Please include:    Name of requested medication    Name of the pharmacy.    For non-opioid medications, call your pharmacy directly to request a refill. Please allow 3-4 days to be processed.     Per [place] State Law:    All controlled substance prescriptions must be filled within 30 days of being written.      For those controlled substances allowing refills, pickup must occur within 30 days of last fill.      We believe regular attendance is key to your success in our program!      Any time you are unable to keep your appointment we ask that you call us at least 24 hours in advance to cancel.This will allow us to offer the appointment time to another patient.     Multiple missed appointments may lead to dismissal from the clinic.

## 2021-04-22 NOTE — PROGRESS NOTES
[facility] Medical Spine Consultation    Date of visit: 4/22/2021    Assessment:  Casey Benitez is a 56 year old male with a past medical history significant for HLD, HTN, Bladder cancer, COPD, anxiety who presents with complaints of chronic left sided neck pain. Exam shows mildly reduced cervical range of motion with left side bending and right rotation. Negative Spurling's, normal neurological exam. The patient did have trigger points palpable in the right trapezius, levator scapulae and rhomboid. Discussed with the patient that their left sided neck pain is likely due to a combination of cervical facet arthropathy and trigger points. They were in agreement with the plan detailed below.      Screening tools:  Oswestry score is 9 corresponding to a 18% disability        Plan:  Diagnosis reviewed, treatment options addressed, and risks/benefits discussed. The patient was involved in shared decision making regarding the plan as laid out below.    1. Education: The patient was educated as to the natural history of their disorder. Reassurance was given and the patient was encouraged to engage in activity and movement as able.  2. Physical Therapy:   PT to evaluate and treat for cervical facet arthropathy and Trigger points. PT will assist with pacing, coping strategies, stretching, and strengthening as appropriate.  3. Diagnostic Studies:  None, consider cervical MRI if no improvement at follow-up.  4. Medication Management:    1. Continue aleve 500mg bid  2. Continue flexeril 5mg tid prn  5. Further procedures recommended: none at this time. Consider TPI if no improvement with PT  6. Recommendations/follow-up for PCP:  Encourage activity and exercise  7. Follow up: 6-8 week virtual visit.    Vern Hodgson DO  [facility]          Primary Care Provider: Dr. Ramona Ann Aaseby-Aguilera    Reason for consultation:    Casey Benitez is a 56 year old male who is seen in consultation today at the  request of his primary care physician, Aaseby-Aguilera, Ramona Ann .     Consultation and Evaluation for: Medical spine evaluation    Review of [place] Prescription Monitoring Program (): Today I have also reviewed the patient's history of controlled substance use, as provided by [place] licensed pharmacies and prescriber dispensers.       Review of Electronic Chart: Today I have also reviewed available medical information in the patient's medical record at [facility] ([facility] including relevant provider notes, laboratory work, and imaging.       Chief Complaint:    nck pain    Pain history:  Casey Benitez is a 56 year old who first started having problems with neck and left arm pain about 3 months ago.    Initially he started noticing cracking in the left side of his neck, muscle spasms about 3 months ago. About a month after persistent symptoms he began having pain radiating into the left shoulder. He has intermittent pain and uncomfortable sensations further down in his left forearm with certain types of positioning, specifically if his head is rotated and bent to the left. When the pain is severe even pressure on the top of his head can be very uncomfortable. Currently his neck pain and the left upper extremity radiating symptoms seem to flare up based on his activity and the amount of head turning.    Pain is localized to left lower neck   Radiation of pain: left upper extremity  Other associated symptoms: intermittent paresthesias  History of cancer or weight loss: Has had bladder cancer, in remission. No weight loss    Pain is described as Nagging   Quality and timing of pain: constant  Pain rating: averages 4/10 on a 0-10 scale.  Aggravating factors include: twisting  Relieving factors include: rest  Denies red flags including: bowel or bladder symptoms, fever, chills, saddle anesthesia, profound motor loss, history of cancer, history of immune compromise, weight loss.     Impact of Pain: There is  significant limitation with head turning, mobility.    Current medication treatments include:  -Aleve 500mg prn  -Flexeril 10mg hs prn  Pain medications are being prescribed by PCP.    Previous medication treatments included:  none    Other treatments have included:  Casey Benitez has not been seen at a pain clinic in the past.    Behavioral interventions: hasn't tried  PT: hasn't tried  Manual Medicine: one sessions - mild improvement in strength and pain    Interventional:  Acupuncture: hasn't tried  TENs Unit: hasn't tried  Injections: hasn't tried    Past Medical History:  Past Medical History:   Diagnosis Date     Bladder cancer (H)      COPD (chronic obstructive pulmonary disease) (H) 9/16/2016     Elevated LFTs      Gastro-oesophageal reflux disease     heartburn     GERD (gastroesophageal reflux disease)      Hyperlipidemia LDL goal < 100      Hypertension      Hypertension goal BP (blood pressure) < 140/90      Impaired fasting glucose      Liver disease     fatty liver disease     Tobacco abuse      Wheezes      Past Surgical History:  Past Surgical History:   Procedure Laterality Date     ABDOMEN SURGERY  07/30/2013    Radical Cystectomy     COLONOSCOPY  December 2012     CYSTOSCOPY, RETROGRADES, COMBINED  12/18/2012    Procedure: COMBINED CYSTOSCOPY, RETROGRADES;;  Surgeon: Von Zee MD;  Location:  OR     CYSTOSCOPY, RETROGRADES, COMBINED       CYSTOSCOPY, TRANSURETHRAL RESECTION (TUR) PROSTATE, COMBINED       CYSTOSCOPY, TRANSURETHRAL RESECTION (TUR) TUMOR BLADDER, COMBINED  12/18/2012    Procedure: COMBINED CYSTOSCOPY, TRANSURETHRAL RESECTION (TUR) TUMOR BLADDER;  CYSTOSCOPY, BILATERAL RETROGRADES AND TRANSURETHRAL RESECTION BLADDER TUMOR  (MITOMYCIN TREATMENT);  Surgeon: Von Zee MD;  Location:  OR     DAVINCI CYSTECTOMY BLADDER RADICAL, ILEAL DIVERSION, COMBINED  7/30/2013    Procedure: COMBINED DAVINCI CYSTECTOMY BLADDER RADICAL, ILEAL DIVERSION;  ROBOTIC ASSISTED  "CYSTOPROSTATECTOMY  WITH ILEO DIVERSION;  Surgeon: Von Zee MD;  Location: [facility] OR     GI SURGERY      ileal diversion     HC TOOTH EXTRACTION W/FORCEP       IR CHEST PORT PLACEMENT > 5 YRS OF AGE RIGHT       Medications:  Current Outpatient Medications   Medication Sig Dispense Refill     Alcohol Swabs (B-D SINGLE USE SWABS REGULAR) PADS        atenolol (TENORMIN) 100 MG tablet Take 1 tablet (100 mg) by mouth daily 90 tablet 3     BD INSULIN SYRINGE U/F 30G X 1/2\" 0.5 ML miscellaneous        cyclobenzaprine (FLEXERIL) 10 MG tablet Take 1 tablet (10 mg) by mouth 3 times daily as needed for muscle spasms 30 tablet 0     hydrochlorothiazide (HYDRODIURIL) 12.5 MG tablet Take 1 tablet (12.5 mg) by mouth daily 90 tablet 3     lisinopril (ZESTRIL) 20 MG tablet Take 1.5 tablets (30 mg) by mouth daily 135 tablet 3     naproxen (NAPROSYN) 500 MG tablet Take 1 tablet (500 mg) by mouth 2 times daily (with meals) for 14 days 28 tablet 0     triamcinolone (KENALOG) 0.1 % external cream Apply topically 2 times daily 30 g 0     Allergies:   No Known Allergies    Social History:  Home situation: lives in [facility]  Occupation/Schooling:[employer], retired  Tobacco use: denies  Alcohol use: denies  Drug use: denies    Chemical dependency: The patient denies any history of treatment for alcohol or drug abuse.    Family history:  Family History   Problem Relation Age of Onset     C.A.D. Father         CABG, LATE 50s     Family History Negative Father      Hypertension Father      Family History Negative Mother      Hypertension Maternal Grandfather      Hypertension Maternal Grandmother              Diagnostic Tests:  Cervical XR completed on 4/8/21 showed:                                                                   IMPRESSION: There is normal alignment of the cervical vertebrae;  however, there is straightening of normal cervical lordosis. Vertebral  body heights of the cervical spine are normal. " Craniocervical  alignment is normal. There is no evidence for fracture of the cervical  spine. Degenerative endplate spurring at C5-C6. Facet arthropathy at  C7-T1.       Review of Systems:    POSTIVE IN BOLD  GENERAL: fever/chills, fatigue, general unwell feeling, weight gain/loss.  HEAD/EYES:  headache, dizziness, or vision changes.    EARS/NOSE/THROAT:  Nosebleeds, hearing loss, sinus infection, earache, tinnitus.  IMMUNE:  Allergies, cancer, immune deficiency, or infections.  SKIN:  Urticaria, rash, hives  HEME/Lymphatic:   anemia, easy bruising, easy bleeding.  RESPIRATORY:  cough, wheezing, or shortness of breath  CARDIOVASCULAR/Circulation:  Extremity edema, syncope, hypertension, tachycardia, or angina.  GASTROINTESTINAL:  abdominal pain, nausea/emesis, diarrhea, constipation,  hematochezia, or melena.  ENDOCRINE:  Diabetes, steroid use,  thyroid disease or osteoporosis.  MUSCULOSKELETAL: neck pain, back pain, arthralgia, arthritis, or gout.  GENITOURINARY:  frequency, urgency, dysuria, difficulty voiding, hematuria or incontinence.  NEUROLOGIC:  weakness, numbness, paresthesias, seizure, tremor, stroke or memory loss.  PSYCHIATRIC:  depression, anxiety, stress, suicidal thoughts or mood swings.     Physical Exam:  Vitals:    04/22/21 1507   BP: (!) 167/88   Pulse: 61   SpO2: 97%     Exam:  Constitutional: healthy, alert and no distress  Head: normocephalic. Atraumatic.   Eyes: no redness or jaundice noted   ENT: oropharnx normal.  MMM.   Skin: no suspicious lesions or rashes  Psychiatric: mentation appears normal and affect normal/bright    Musculoskeletal exam:  Gait/Station/Posture: normal gait  Cervical spine: mildly reduced in rotation to the right and side bending to the left  Myofascial tenderness:  Trigger points palpable in the left trapezius, levator and rhomboid  Spurling's was negative.    Neurologic exam:  Motor:  5/5 UE and LE strength  Reflexes:     Biceps:     R:  2/4 L: 2/4   Brachioradialis    R:  2/4 L: 2/4   Triceps:  R:  2/4 L: 2/4   Patella:  R:  2/4 L: 2/4   Achilles:  R:  2/4 L: 2/4   Sensory:  (upper and lower extremities):   Light touch: normal       BILLING TIME DOCUMENTATION:   The total TIME spent on this patient on the date of the encounter/appointment was 35 minutes.      TOTAL TIME includes:   Time spent preparing to see the patient (reviewing records and tests)   Time spent face to face (or over the phone) with the patient   Time spent ordering tests, medications, procedures and referrals   Time spent documenting clinical information in Epic         Vern Hodgson D.O.  Medical Spine Specialist  [facility]

## 2021-04-22 NOTE — Clinical Note
Neck pain likely due to a combination of facet arthritis and muscle pain. Patient will work with PT and follow up as needed in 6-8 weeks for further evaluation and treatment.    Thanks for the referral,    Vern Hodgson DO  [facility]

## 2021-04-29 ENCOUNTER — IMMUNIZATION (OUTPATIENT)
Dept: FAMILY MEDICINE | Facility: CLINIC | Age: 56
End: 2021-04-29
Attending: FAMILY MEDICINE
Payer: COMMERCIAL

## 2021-04-29 PROCEDURE — 91301 PR COVID VAC MODERNA 100 MCG/0.5 ML IM: CPT

## 2021-04-29 PROCEDURE — 0012A PR COVID VAC MODERNA 100 MCG/0.5 ML IM: CPT

## 2021-10-24 ENCOUNTER — HEALTH MAINTENANCE LETTER (OUTPATIENT)
Age: 56
End: 2021-10-24

## 2021-12-19 ENCOUNTER — HEALTH MAINTENANCE LETTER (OUTPATIENT)
Age: 56
End: 2021-12-19

## 2022-01-23 DIAGNOSIS — I10 ESSENTIAL HYPERTENSION WITH GOAL BLOOD PRESSURE LESS THAN 140/90: ICD-10-CM

## 2022-01-26 RX ORDER — ATENOLOL 100 MG/1
TABLET ORAL
Qty: 90 TABLET | Refills: 3 | Status: SHIPPED | OUTPATIENT
Start: 2022-01-26 | End: 2022-12-09

## 2022-01-26 RX ORDER — LISINOPRIL 20 MG/1
TABLET ORAL
Qty: 135 TABLET | Refills: 3 | Status: SHIPPED | OUTPATIENT
Start: 2022-01-26 | End: 2022-12-09

## 2022-01-26 RX ORDER — HYDROCHLOROTHIAZIDE 12.5 MG/1
TABLET ORAL
Qty: 90 TABLET | Refills: 3 | Status: SHIPPED | OUTPATIENT
Start: 2022-01-26 | End: 2022-12-09

## 2022-01-26 NOTE — TELEPHONE ENCOUNTER
Routing refill request to provider for review/approval because:  Elevated BP    Anna Starkey RN   [facility]  -- Triage Nurse

## 2022-04-13 ENCOUNTER — TRANSFERRED RECORDS (OUTPATIENT)
Dept: HEALTH INFORMATION MANAGEMENT | Facility: CLINIC | Age: 57
End: 2022-04-13
Payer: COMMERCIAL

## 2022-05-28 DIAGNOSIS — M54.2 NECK PAIN: ICD-10-CM

## 2022-05-29 ENCOUNTER — OFFICE VISIT (OUTPATIENT)
Dept: URGENT CARE | Facility: URGENT CARE | Age: 57
End: 2022-05-29
Payer: COMMERCIAL

## 2022-05-29 VITALS
SYSTOLIC BLOOD PRESSURE: 126 MMHG | RESPIRATION RATE: 12 BRPM | DIASTOLIC BLOOD PRESSURE: 60 MMHG | HEART RATE: 50 BPM | BODY MASS INDEX: 27.98 KG/M2 | TEMPERATURE: 97.1 F | OXYGEN SATURATION: 97 % | WEIGHT: 195 LBS

## 2022-05-29 DIAGNOSIS — R39.9 UTI SYMPTOMS: Primary | ICD-10-CM

## 2022-05-29 DIAGNOSIS — R10.9 FLANK PAIN: ICD-10-CM

## 2022-05-29 LAB
ALBUMIN UR-MCNC: NEGATIVE MG/DL
APPEARANCE UR: CLEAR
BACTERIA #/AREA URNS HPF: ABNORMAL /HPF
BILIRUB UR QL STRIP: NEGATIVE
COLOR UR AUTO: YELLOW
GLUCOSE UR STRIP-MCNC: NEGATIVE MG/DL
HGB UR QL STRIP: ABNORMAL
KETONES UR STRIP-MCNC: NEGATIVE MG/DL
LEUKOCYTE ESTERASE UR QL STRIP: NEGATIVE
NITRATE UR QL: NEGATIVE
PH UR STRIP: 7 [PH] (ref 5–7)
RBC #/AREA URNS AUTO: ABNORMAL /HPF
SP GR UR STRIP: 1.01 (ref 1–1.03)
UROBILINOGEN UR STRIP-ACNC: 0.2 E.U./DL
WBC #/AREA URNS AUTO: ABNORMAL /HPF

## 2022-05-29 PROCEDURE — 99213 OFFICE O/P EST LOW 20 MIN: CPT | Mod: 25 | Performed by: PHYSICIAN ASSISTANT

## 2022-05-29 PROCEDURE — 96372 THER/PROPH/DIAG INJ SC/IM: CPT | Performed by: PHYSICIAN ASSISTANT

## 2022-05-29 PROCEDURE — 81001 URINALYSIS AUTO W/SCOPE: CPT

## 2022-05-29 RX ORDER — CEFDINIR 300 MG/1
300 CAPSULE ORAL 2 TIMES DAILY
Qty: 14 CAPSULE | Refills: 0 | Status: SHIPPED | OUTPATIENT
Start: 2022-05-29 | End: 2022-06-05

## 2022-05-29 RX ORDER — CEFTRIAXONE SODIUM 1 G
1 VIAL (EA) INJECTION ONCE
Status: COMPLETED | OUTPATIENT
Start: 2022-05-29 | End: 2022-05-29

## 2022-05-29 RX ADMIN — Medication 1 G: at 16:43

## 2022-05-29 NOTE — PROGRESS NOTES
"SUBJECTIVE:   Casey Benitez is a 57 year old male who  presents today for a possible UTI.  Patient has a history of bladder cancer and has a urostomy.  He does have frequent urination that started while 2 days ago.  He also has some left-sided flank pain.  He has not had fever.  He is here his typical symptoms for urinary kidney issues.  He is still taking in oral fluids.  There is no other symptoms.  Denies any blood noted.  Bowel functions normal.  Has no abdominal pain no nausea or vomiting    Past Medical History:   Diagnosis Date     Bladder cancer (H)      COPD (chronic obstructive pulmonary disease) (H) 9/16/2016     Elevated LFTs      Gastro-oesophageal reflux disease     heartburn     GERD (gastroesophageal reflux disease)      Hyperlipidemia LDL goal < 100      Hypertension      Hypertension goal BP (blood pressure) < 140/90      Impaired fasting glucose      Liver disease     fatty liver disease     Tobacco abuse      Wheezes      Current Outpatient Medications   Medication Sig Dispense Refill     atenolol (TENORMIN) 100 MG tablet TAKE 1 TABLET DAILY (NEEDS TO BE SEEN, APPOINTMENT DUE) 90 tablet 3     BD INSULIN SYRINGE U/F 30G X 1/2\" 0.5 ML miscellaneous        hydrochlorothiazide (HYDRODIURIL) 12.5 MG tablet TAKE 1 TABLET DAILY (MEDICATION IS BEING FILLED FOR 1 TIME REFILL ONLY DUE TO NEED TO BE SEEN BECAUSE APPOINTMENT DUE) 90 tablet 3     lisinopril (ZESTRIL) 20 MG tablet TAKE ONE AND ONE-HALF TABLETS DAILY (MEDICATION IS BEING FILLED FOR 1 TIME REFILL ONLY DUE TO NEED TO BE SEEN BECAUSE APPOINTMENT DUE) 135 tablet 3     triamcinolone (KENALOG) 0.1 % external cream Apply topically 2 times daily 30 g 0     Alcohol Swabs (B-D SINGLE USE SWABS REGULAR) PADS        cyclobenzaprine (FLEXERIL) 10 MG tablet Take 1 tablet (10 mg) by mouth 3 times daily as needed for muscle spasms (Patient not taking: Reported on 5/29/2022) 30 tablet 0     Social History     Tobacco Use     Smoking status: Former Smoker     " Packs/day: 0.50     Years: 20.00     Pack years: 10.00     Types: Cigarettes     Start date: 1992     Quit date: 2012     Years since quittin.4     Smokeless tobacco: Never Used   Substance Use Topics     Alcohol use: No     Comment: stopped drinking        ROS:   Review of systems negative except as stated above.    OBJECTIVE:  /60 (BP Location: Right arm, Patient Position: Sitting, Cuff Size: Adult Regular)   Pulse 50   Temp 97.1  F (36.2  C) (Tympanic)   Resp 12   Wt 88.5 kg (195 lb)   SpO2 97%   BMI 27.98 kg/m    GENERAL APPEARANCE: healthy, alert and no distress  RESP: lungs clear to auscultation - no rales, rhonchi or wheezes  CV: regular rates and rhythm, normal S1 S2, no murmur noted  ABDOMEN:  soft, nontender, no HSM or masses and bowel sounds normal  BACK: No CVA tenderness  SKIN: no suspicious lesions or rashes    ASSESSMENT:   Lower, uncomplicated urinary tract infection.  Flank pain    PLAN:  Single gram of Rocephin given in the clinic due to underlying medical conditions.  He does have a history of bladder and kidney issues as above.  Omnicef as directed.  We will continue to follow-up with primary as needed and watch for fevers blood or increasing flank pain with any sort of nausea vomiting.  Patient was understanding agreement with above plan  Drink plenty of fluids.  Prevention and treatment of UTI's discussed.Signs and symptoms of pyelonephritis mentioned.  Follow up with primary care physician if not improving

## 2022-05-29 NOTE — NURSING NOTE
"Chief Complaint   Patient presents with     Urgent Care     Pt has a urostomy and thinks he may have a uti.  Frequent urination started on Friday.  He is having some left sided back pain.  He has not had a fever.     Initial /60 (BP Location: Right arm, Patient Position: Sitting, Cuff Size: Adult Regular)   Pulse 50   Temp 97.1  F (36.2  C) (Tympanic)   Resp 12   Wt 88.5 kg (195 lb)   SpO2 97%   BMI 27.98 kg/m   Estimated body mass index is 27.98 kg/m  as calculated from the following:    Height as of 4/8/21: 1.778 m (5' 10\").    Weight as of this encounter: 88.5 kg (195 lb)..  BP completed using cuff size: regular  Abigail Ham R.N.    "

## 2022-05-30 RX ORDER — CYCLOBENZAPRINE HCL 10 MG
TABLET ORAL
Qty: 30 TABLET | Refills: 35 | Status: SHIPPED | OUTPATIENT
Start: 2022-05-30

## 2022-06-06 ENCOUNTER — TELEPHONE (OUTPATIENT)
Dept: PALLIATIVE MEDICINE | Facility: CLINIC | Age: 57
End: 2022-06-06
Payer: COMMERCIAL

## 2022-10-15 ENCOUNTER — HEALTH MAINTENANCE LETTER (OUTPATIENT)
Age: 57
End: 2022-10-15

## 2022-12-09 ENCOUNTER — OFFICE VISIT (OUTPATIENT)
Dept: FAMILY MEDICINE | Facility: CLINIC | Age: 57
End: 2022-12-09
Payer: COMMERCIAL

## 2022-12-09 VITALS
OXYGEN SATURATION: 99 % | SYSTOLIC BLOOD PRESSURE: 135 MMHG | BODY MASS INDEX: 26.75 KG/M2 | HEIGHT: 71 IN | DIASTOLIC BLOOD PRESSURE: 67 MMHG | TEMPERATURE: 97.6 F | WEIGHT: 191.1 LBS | HEART RATE: 51 BPM | RESPIRATION RATE: 18 BRPM

## 2022-12-09 DIAGNOSIS — Z12.11 SCREEN FOR COLON CANCER: ICD-10-CM

## 2022-12-09 DIAGNOSIS — E53.8 VITAMIN B 12 DEFICIENCY: Primary | ICD-10-CM

## 2022-12-09 DIAGNOSIS — I10 ESSENTIAL HYPERTENSION WITH GOAL BLOOD PRESSURE LESS THAN 140/90: ICD-10-CM

## 2022-12-09 DIAGNOSIS — Z00.00 ROUTINE GENERAL MEDICAL EXAMINATION AT A HEALTH CARE FACILITY: ICD-10-CM

## 2022-12-09 LAB
ALBUMIN SERPL BCG-MCNC: 4.4 G/DL (ref 3.5–5.2)
ALP SERPL-CCNC: 75 U/L (ref 40–129)
ALT SERPL W P-5'-P-CCNC: 30 U/L (ref 10–50)
ANION GAP SERPL CALCULATED.3IONS-SCNC: 10 MMOL/L (ref 7–15)
AST SERPL W P-5'-P-CCNC: 28 U/L (ref 10–50)
BILIRUB SERPL-MCNC: 0.8 MG/DL
BUN SERPL-MCNC: 14.3 MG/DL (ref 8–23)
CALCIUM SERPL-MCNC: 9.2 MG/DL (ref 8.6–10)
CHLORIDE SERPL-SCNC: 98 MMOL/L (ref 98–107)
CHOLEST SERPL-MCNC: 226 MG/DL
CREAT SERPL-MCNC: 0.96 MG/DL (ref 0.67–1.17)
DEPRECATED HCO3 PLAS-SCNC: 27 MMOL/L (ref 22–29)
ERYTHROCYTE [DISTWIDTH] IN BLOOD BY AUTOMATED COUNT: 11.5 % (ref 10–15)
GFR SERPL CREATININE-BSD FRML MDRD: >90 ML/MIN/1.73M2
GLUCOSE SERPL-MCNC: 107 MG/DL (ref 70–99)
HCT VFR BLD AUTO: 42.3 % (ref 40–53)
HDLC SERPL-MCNC: 39 MG/DL
HGB BLD-MCNC: 14.8 G/DL (ref 13.3–17.7)
LDLC SERPL CALC-MCNC: 161 MG/DL
MCH RBC QN AUTO: 33 PG (ref 26.5–33)
MCHC RBC AUTO-ENTMCNC: 35 G/DL (ref 31.5–36.5)
MCV RBC AUTO: 94 FL (ref 78–100)
NONHDLC SERPL-MCNC: 187 MG/DL
PLATELET # BLD AUTO: 230 10E3/UL (ref 150–450)
POTASSIUM SERPL-SCNC: 4.3 MMOL/L (ref 3.4–5.3)
PROT SERPL-MCNC: 7.7 G/DL (ref 6.4–8.3)
RBC # BLD AUTO: 4.49 10E6/UL (ref 4.4–5.9)
SODIUM SERPL-SCNC: 135 MMOL/L (ref 136–145)
TRIGL SERPL-MCNC: 129 MG/DL
TSH SERPL DL<=0.005 MIU/L-ACNC: 2.44 UIU/ML (ref 0.3–4.2)
VIT B12 SERPL-MCNC: 802 PG/ML (ref 232–1245)
WBC # BLD AUTO: 9.3 10E3/UL (ref 4–11)

## 2022-12-09 PROCEDURE — 82607 VITAMIN B-12: CPT | Performed by: PHYSICIAN ASSISTANT

## 2022-12-09 PROCEDURE — 36415 COLL VENOUS BLD VENIPUNCTURE: CPT | Performed by: PHYSICIAN ASSISTANT

## 2022-12-09 PROCEDURE — 80053 COMPREHEN METABOLIC PANEL: CPT | Performed by: PHYSICIAN ASSISTANT

## 2022-12-09 PROCEDURE — 85027 COMPLETE CBC AUTOMATED: CPT | Performed by: PHYSICIAN ASSISTANT

## 2022-12-09 PROCEDURE — 80061 LIPID PANEL: CPT | Performed by: PHYSICIAN ASSISTANT

## 2022-12-09 PROCEDURE — 84443 ASSAY THYROID STIM HORMONE: CPT | Performed by: PHYSICIAN ASSISTANT

## 2022-12-09 PROCEDURE — 99396 PREV VISIT EST AGE 40-64: CPT | Performed by: PHYSICIAN ASSISTANT

## 2022-12-09 RX ORDER — HYDROCHLOROTHIAZIDE 12.5 MG/1
TABLET ORAL
Qty: 90 TABLET | Refills: 3 | Status: SHIPPED | OUTPATIENT
Start: 2022-12-09 | End: 2023-12-22

## 2022-12-09 RX ORDER — ATENOLOL 100 MG/1
TABLET ORAL
Qty: 90 TABLET | Refills: 3 | Status: SHIPPED | OUTPATIENT
Start: 2022-12-09 | End: 2023-12-22

## 2022-12-09 RX ORDER — LISINOPRIL 20 MG/1
TABLET ORAL
Qty: 135 TABLET | Refills: 3 | Status: SHIPPED | OUTPATIENT
Start: 2022-12-09 | End: 2023-12-22

## 2022-12-09 SDOH — ECONOMIC STABILITY: INCOME INSECURITY: IN THE LAST 12 MONTHS, WAS THERE A TIME WHEN YOU WERE NOT ABLE TO PAY THE MORTGAGE OR RENT ON TIME?: NO

## 2022-12-09 SDOH — ECONOMIC STABILITY: TRANSPORTATION INSECURITY
IN THE PAST 12 MONTHS, HAS THE LACK OF TRANSPORTATION KEPT YOU FROM MEDICAL APPOINTMENTS OR FROM GETTING MEDICATIONS?: NO

## 2022-12-09 SDOH — ECONOMIC STABILITY: FOOD INSECURITY: WITHIN THE PAST 12 MONTHS, THE FOOD YOU BOUGHT JUST DIDN'T LAST AND YOU DIDN'T HAVE MONEY TO GET MORE.: NEVER TRUE

## 2022-12-09 SDOH — ECONOMIC STABILITY: INCOME INSECURITY: HOW HARD IS IT FOR YOU TO PAY FOR THE VERY BASICS LIKE FOOD, HOUSING, MEDICAL CARE, AND HEATING?: SOMEWHAT HARD

## 2022-12-09 SDOH — HEALTH STABILITY: PHYSICAL HEALTH: ON AVERAGE, HOW MANY MINUTES DO YOU ENGAGE IN EXERCISE AT THIS LEVEL?: 30 MIN

## 2022-12-09 SDOH — ECONOMIC STABILITY: TRANSPORTATION INSECURITY
IN THE PAST 12 MONTHS, HAS LACK OF TRANSPORTATION KEPT YOU FROM MEETINGS, WORK, OR FROM GETTING THINGS NEEDED FOR DAILY LIVING?: NO

## 2022-12-09 SDOH — HEALTH STABILITY: PHYSICAL HEALTH: ON AVERAGE, HOW MANY DAYS PER WEEK DO YOU ENGAGE IN MODERATE TO STRENUOUS EXERCISE (LIKE A BRISK WALK)?: 1 DAY

## 2022-12-09 SDOH — ECONOMIC STABILITY: FOOD INSECURITY: WITHIN THE PAST 12 MONTHS, YOU WORRIED THAT YOUR FOOD WOULD RUN OUT BEFORE YOU GOT MONEY TO BUY MORE.: NEVER TRUE

## 2022-12-09 ASSESSMENT — SOCIAL DETERMINANTS OF HEALTH (SDOH)
DO YOU BELONG TO ANY CLUBS OR ORGANIZATIONS SUCH AS CHURCH GROUPS UNIONS, FRATERNAL OR ATHLETIC GROUPS, OR SCHOOL GROUPS?: NO
HOW OFTEN DO YOU ATTEND CHURCH OR RELIGIOUS SERVICES?: NEVER
IN A TYPICAL WEEK, HOW MANY TIMES DO YOU TALK ON THE PHONE WITH FAMILY, FRIENDS, OR NEIGHBORS?: NEVER
HOW OFTEN DO YOU GET TOGETHER WITH FRIENDS OR RELATIVES?: NEVER

## 2022-12-09 ASSESSMENT — ENCOUNTER SYMPTOMS
JOINT SWELLING: 0
PALPITATIONS: 0
HEARTBURN: 0
DYSURIA: 0
COUGH: 0
ABDOMINAL PAIN: 0
WEAKNESS: 0
FEVER: 0
CHILLS: 0
HEADACHES: 0
SHORTNESS OF BREATH: 0
EYE PAIN: 0
NERVOUS/ANXIOUS: 0
MYALGIAS: 0
ARTHRALGIAS: 0
DIARRHEA: 0
FREQUENCY: 0
DIZZINESS: 0
PARESTHESIAS: 0
CONSTIPATION: 0
HEMATOCHEZIA: 0
HEMATURIA: 0
SORE THROAT: 0
NAUSEA: 0

## 2022-12-09 ASSESSMENT — PAIN SCALES - GENERAL: PAINLEVEL: NO PAIN (0)

## 2022-12-09 ASSESSMENT — LIFESTYLE VARIABLES
HOW OFTEN DO YOU HAVE A DRINK CONTAINING ALCOHOL: NEVER
AUDIT-C TOTAL SCORE: 0
HOW MANY STANDARD DRINKS CONTAINING ALCOHOL DO YOU HAVE ON A TYPICAL DAY: PATIENT DOES NOT DRINK
SKIP TO QUESTIONS 9-10: 1
HOW OFTEN DO YOU HAVE SIX OR MORE DRINKS ON ONE OCCASION: NEVER

## 2022-12-09 NOTE — PROGRESS NOTES
SUBJECTIVE:   CC: Casey is an 57 year old who presents for preventative health visit.   Patient has been advised of split billing requirements and indicates understanding: Yes  Healthy Habits:     Getting at least 3 servings of Calcium per day:  NO    Bi-annual eye exam:  NO    Dental care twice a year:  NO    Sleep apnea or symptoms of sleep apnea:  None    Diet:  Regular (no restrictions)    Frequency of exercise:  1 day/week    Duration of exercise:  15-30 minutes    Taking medications regularly:  Yes    Medication side effects:  None    PHQ-2 Total Score: 0    Additional concerns today:  Yes        Today's PHQ-2 Score:   PHQ-2 (  [employer]) 2022   Q1: Little interest or pleasure in doing things 0   Q2: Feeling down, depressed or hopeless 0   PHQ-2 Score 0   PHQ-2 Total Score (12-17 Years)- Positive if 3 or more points; Administer PHQ-A if positive -   Q1: Little interest or pleasure in doing things Not at all   Q2: Feeling down, depressed or hopeless Not at all   PHQ-2 Score 0           Social History     Tobacco Use     Smoking status: Former     Packs/day: 0.50     Years: 20.00     Pack years: 10.00     Types: Cigarettes     Start date: 1992     Quit date: 2012     Years since quittin.9     Smokeless tobacco: Never   Substance Use Topics     Alcohol use: No     Comment: stopped drinking          Alcohol Use 2022   Prescreen: >3 drinks/day or >7 drinks/week? No   Prescreen: >3 drinks/day or >7 drinks/week? -       Last PSA:   PSA   Date Value Ref Range Status   2009 0.24 0 - 4 ug/L Final       Reviewed orders with patient. Reviewed health maintenance and updated orders accordingly - Yes  BP Readings from Last 3 Encounters:   22 135/67   22 126/60   21 (!) 167/88    Wt Readings from Last 3 Encounters:   22 86.7 kg (191 lb 1.6 oz)   22 88.5 kg (195 lb)   21 88.8 kg (195 lb 11.2 oz)                  Recent Labs   Lab Test 22  4727  "10/14/20  1109 06/13/19  0839 03/07/19  0902   * 165* 133*  --    HDL 39* 41 43  --    TRIG 129 132 130  --    ALT 30 40  --  39   CR 0.96 0.77  --  0.86   GFRESTIMATED >90 >90  --  >90   GFRESTBLACK  --  >90  --  >90   POTASSIUM 4.3 4.1  --  3.6   TSH 2.44  --   --   --         Reviewed and updated as needed this visit by clinical staff   Tobacco  Allergies  Meds              Reviewed and updated as needed this visit by Provider                     Review of Systems   Constitutional: Negative for chills and fever.   HENT: Negative for congestion, ear pain, hearing loss and sore throat.    Eyes: Negative for pain and visual disturbance.   Respiratory: Negative for cough and shortness of breath.    Cardiovascular: Negative for chest pain, palpitations and peripheral edema.   Gastrointestinal: Negative for abdominal pain, constipation, diarrhea, heartburn, hematochezia and nausea.   Genitourinary: Negative for dysuria, frequency, genital sores, hematuria, impotence, penile discharge and urgency.   Musculoskeletal: Negative for arthralgias, joint swelling and myalgias.   Skin: Negative for rash.   Neurological: Negative for dizziness, weakness, headaches and paresthesias.   Psychiatric/Behavioral: Negative for mood changes. The patient is not nervous/anxious.          OBJECTIVE:   /67 (BP Location: Right arm, Patient Position: Sitting, Cuff Size: Adult Large)   Pulse 51   Temp 97.6  F (36.4  C) (Oral)   Resp 18   Ht 1.803 m (5' 11\")   Wt 86.7 kg (191 lb 1.6 oz)   SpO2 99%   BMI 26.65 kg/m      Physical Exam  GENERAL: healthy, alert and no distress  EYES: Eyes grossly normal to inspection, PERRL and conjunctivae and sclerae normal  HENT: ear canals and TM's normal, nose and mouth without ulcers or lesions  NECK: no adenopathy, no asymmetry, masses, or scars and thyroid normal to palpation  RESP: lungs clear to auscultation - no rales, rhonchi or wheezes  CV: regular rate and rhythm, normal S1 S2, " "no S3 or S4, no murmur, click or rub, no peripheral edema and peripheral pulses strong  ABDOMEN: soft, nontender, no hepatosplenomegaly, no masses and bowel sounds normal  MS: no gross musculoskeletal defects noted, no edema  SKIN: no suspicious lesions or rashes  NEURO: Normal strength and tone, mentation intact and speech normal  PSYCH: mentation appears normal, affect normal/bright  LYMPH: no cervical, supraclavicular, axillary, or inguinal adenopathy    Diagnostic Test Results:  Labs reviewed in Epic    ASSESSMENT/PLAN:   (E53.8) Vitamin B 12 deficiency  (primary encounter diagnosis)  Comment:   Plan: Vitamin B12            (I10) Hypertension goal BP (blood pressure) < 140/90  Comment: stable   Plan: Lipid Profile, Comprehensive metabolic panel,         TSH with free T4 reflex, CBC with platelets            (Z12.11) Screen for colon cancer  Comment:   Plan: Colonoscopy Screening  Referral            (Z00.00) Routine general medical examination at a health care facility  Comment:   Plan:     (I10) Essential hypertension with goal blood pressure less than 140/90  Comment: stable   Plan: atenolol (TENORMIN) 100 MG tablet,         hydrochlorothiazide (HYDRODIURIL) 12.5 MG         tablet, lisinopril (ZESTRIL) 20 MG tablet                    COUNSELING:   Reviewed preventive health counseling, as reflected in patient instructions       Regular exercise       Healthy diet/nutrition       Vision screening       Hearing screening      BMI:   Estimated body mass index is 26.65 kg/m  as calculated from the following:    Height as of this encounter: 1.803 m (5' 11\").    Weight as of this encounter: 86.7 kg (191 lb 1.6 oz).         He reports that he quit smoking about 9 years ago. His smoking use included cigarettes. He started smoking about 30 years ago. He has a 10.00 pack-year smoking history. He has never used smokeless tobacco.        Ramona Ann Aaseby-Aguilera, PA-C  [facility]  "

## 2023-07-22 ENCOUNTER — OFFICE VISIT (OUTPATIENT)
Dept: URGENT CARE | Facility: URGENT CARE | Age: 58
End: 2023-07-22
Payer: COMMERCIAL

## 2023-07-22 VITALS
SYSTOLIC BLOOD PRESSURE: 116 MMHG | OXYGEN SATURATION: 97 % | RESPIRATION RATE: 16 BRPM | TEMPERATURE: 97.7 F | DIASTOLIC BLOOD PRESSURE: 64 MMHG | HEART RATE: 54 BPM

## 2023-07-22 DIAGNOSIS — H81.13 BENIGN PAROXYSMAL POSITIONAL VERTIGO DUE TO BILATERAL VESTIBULAR DISORDER: Primary | ICD-10-CM

## 2023-07-22 PROCEDURE — 99213 OFFICE O/P EST LOW 20 MIN: CPT | Performed by: FAMILY MEDICINE

## 2023-07-22 NOTE — PATIENT INSTRUCTIONS
Follow up with your primary provider if the symptoms haven't resolved over the next 3-5 days, or sooner if any new or worsening symptoms develop.

## 2023-07-22 NOTE — PROGRESS NOTES
SUBJECTIVE:   Casey Benitez is a 58 year old male presenting with   Chief Complaint   Patient presents with     Otalgia     2 days, right ear, dizziness, plugged     Symptoms started 2-3 days ago with vertigo on lying down or change of position in bed.   Spinning sensation is intense and lasts about 30 seconds then resolves on its own.   Right ear feels plugged.   No h/o similar symptoms.  No c/o headaches, being lightheaded, fevers or uri s/s.        OBJECTIVE  /64   Pulse 54   Temp 97.7  F (36.5  C)   Resp 16   SpO2 97%   GENERAL:  Awake, alert and interactive. No acute distress.  HEENT:   NC/AT, EOMI, clear conjunctiva. Nose clear.  Oropharynx moist and clear.  TM's dull and EAC's benign.   NECK: supple and free of adenopathy  NEURO:  CN 2-12 grossly intact.    With margaret hallpike maneuver, the vertigo is evident most looking to the right, but present on each side.   Lasts less than a minute each side.   Reviewed how to perform at home.      ASSESSMENT/PLAN    ICD-10-CM    1. Benign paroxysmal positional vertigo due to bilateral vestibular disorder  H81.13         Presentation c/w BPPV.  Close f/u with PCP if not improving or to ER or PCP if any worsening symptoms such as persistent vertigo, vomiting, fevers, head/neck pain, for example.    Patient Instructions   Follow up with your primary provider if the symptoms haven't resolved over the next 3-5 days, or sooner if any new or worsening symptoms develop.

## 2023-11-09 ENCOUNTER — PATIENT OUTREACH (OUTPATIENT)
Dept: CARE COORDINATION | Facility: CLINIC | Age: 58
End: 2023-11-09
Payer: COMMERCIAL

## 2023-11-23 ENCOUNTER — PATIENT OUTREACH (OUTPATIENT)
Dept: CARE COORDINATION | Facility: CLINIC | Age: 58
End: 2023-11-23
Payer: COMMERCIAL

## 2023-12-06 ENCOUNTER — MYC MEDICAL ADVICE (OUTPATIENT)
Dept: FAMILY MEDICINE | Facility: CLINIC | Age: 58
End: 2023-12-06
Payer: COMMERCIAL

## 2023-12-06 DIAGNOSIS — C67.9 MALIGNANT NEOPLASM OF URINARY BLADDER, UNSPECIFIED SITE (H): Primary | ICD-10-CM

## 2023-12-08 ENCOUNTER — MYC MEDICAL ADVICE (OUTPATIENT)
Dept: FAMILY MEDICINE | Facility: CLINIC | Age: 58
End: 2023-12-08
Payer: COMMERCIAL

## 2023-12-08 NOTE — TELEPHONE ENCOUNTER
Patient Quality Outreach    Patient is due for the following:   Colon Cancer Screening    Next Steps:   No follow up needed at this time.    Type of outreach:    Sent [employer] message.    Next Steps:  Reach out within 90 days via [email or web address].    Max number of attempts reached: No. Will try again in 90 days if patient still on fail list.    Questions for provider review:    None           Alma Cherry MA  Chart routed to Care Team.

## 2023-12-14 ENCOUNTER — TELEPHONE (OUTPATIENT)
Dept: UROLOGY | Facility: CLINIC | Age: 58
End: 2023-12-14
Payer: COMMERCIAL

## 2023-12-14 ENCOUNTER — PATIENT OUTREACH (OUTPATIENT)
Dept: ONCOLOGY | Facility: CLINIC | Age: 58
End: 2023-12-14
Payer: COMMERCIAL

## 2023-12-14 NOTE — TELEPHONE ENCOUNTER
Spoke with ARTURO Bergman. Patient is 10 years out from having bladder cancer and does not need to see Dr. Waldrop.  Patient would like to see a provider for Trimix injections.  Writer will cancel appointment with Dr. Waldrop and clinic coordinators can schedule patient next available with Dr. Aguero. ARTURO Bergman is also on Epic and can be reached via staff messages/sending encounters as Pacheco Macdonald if needed.  Yvrose Cronin RN on 12/14/2023 at 9:30 AM

## 2023-12-14 NOTE — PROGRESS NOTES
[facility]:                                                                                   Writer reached out to Casey in regards to upcoming appointment with Dr. Waldrop in January. Writer wanting to clarify if patient has had any reoccurrence post cystectomy and the need for urology. Patient clarified that he is seeking new urologist to manage his prescription of Trimax injection. Patient has had no reoccurrence of cancer post cystectomy which was 10 years ago. Patient graduated and no indication too follow with Dr. Waldrop. Writer will help coordinate patient with general urology who potentially can prescribe and treat erectile dysfunction symptoms he is seeking care for.     Writer contacted General urology and they will coordinate with patient. Will continue to follow as needed.     Pacheco Macdonald, RN, BSN.  RN Care Coordinator     [facility]   [phone]

## 2023-12-14 NOTE — TELEPHONE ENCOUNTER
[facility] Call Center    Phone Message    May a detailed message be left on voicemail: yes     Reason for Call: Other: ARTURO Bergman from cancer clinic is calling to inform that pt should be seen by a urologist that sees pt for trimax injection for ED. Pt is currently scheduled for bladder cancer with jannie. Please advise.     Action Taken: Message routed to:  Other: URO    Travel Screening: Not Applicable

## 2023-12-15 NOTE — TELEPHONE ENCOUNTER
Spoke wit pt, he advised just trying to find someone closer to where he lives. I suggested pt call insurance company to get options.

## 2023-12-20 DIAGNOSIS — Z85.51 PERSONAL HISTORY OF MALIGNANT NEOPLASM OF BLADDER: Primary | ICD-10-CM

## 2023-12-22 DIAGNOSIS — I10 ESSENTIAL HYPERTENSION WITH GOAL BLOOD PRESSURE LESS THAN 140/90: ICD-10-CM

## 2023-12-22 RX ORDER — HYDROCHLOROTHIAZIDE 12.5 MG/1
TABLET ORAL
Qty: 30 TABLET | Refills: 1 | Status: SHIPPED | OUTPATIENT
Start: 2023-12-22 | End: 2024-02-26

## 2023-12-22 RX ORDER — ATENOLOL 100 MG/1
TABLET ORAL
Qty: 90 TABLET | Refills: 0 | Status: SHIPPED | OUTPATIENT
Start: 2023-12-22 | End: 2024-03-07

## 2023-12-22 RX ORDER — LISINOPRIL 20 MG/1
TABLET ORAL
Qty: 135 TABLET | Refills: 0 | Status: SHIPPED | OUTPATIENT
Start: 2023-12-22 | End: 2024-03-07

## 2023-12-26 ENCOUNTER — PATIENT OUTREACH (OUTPATIENT)
Dept: CARE COORDINATION | Facility: CLINIC | Age: 58
End: 2023-12-26
Payer: COMMERCIAL

## 2024-01-09 ENCOUNTER — PATIENT OUTREACH (OUTPATIENT)
Dept: CARE COORDINATION | Facility: CLINIC | Age: 59
End: 2024-01-09
Payer: COMMERCIAL

## 2024-01-17 ENCOUNTER — MEDICAL CORRESPONDENCE (OUTPATIENT)
Dept: SCHEDULING | Facility: CLINIC | Age: 59
End: 2024-01-17
Payer: COMMERCIAL

## 2024-02-26 DIAGNOSIS — I10 ESSENTIAL HYPERTENSION WITH GOAL BLOOD PRESSURE LESS THAN 140/90: ICD-10-CM

## 2024-02-26 RX ORDER — HYDROCHLOROTHIAZIDE 12.5 MG/1
TABLET ORAL
Qty: 30 TABLET | Refills: 1 | Status: SHIPPED | OUTPATIENT
Start: 2024-02-26 | End: 2024-04-17

## 2024-03-06 DIAGNOSIS — I10 ESSENTIAL HYPERTENSION WITH GOAL BLOOD PRESSURE LESS THAN 140/90: ICD-10-CM

## 2024-03-07 RX ORDER — ATENOLOL 100 MG/1
TABLET ORAL
Qty: 30 TABLET | Refills: 0 | Status: SHIPPED | OUTPATIENT
Start: 2024-03-07 | End: 2024-04-29

## 2024-03-07 RX ORDER — LISINOPRIL 20 MG/1
TABLET ORAL
Qty: 135 TABLET | Refills: 0 | Status: SHIPPED | OUTPATIENT
Start: 2024-03-07 | End: 2024-04-29

## 2024-03-17 ENCOUNTER — HEALTH MAINTENANCE LETTER (OUTPATIENT)
Age: 59
End: 2024-03-17

## 2024-04-17 DIAGNOSIS — I10 ESSENTIAL HYPERTENSION WITH GOAL BLOOD PRESSURE LESS THAN 140/90: ICD-10-CM

## 2024-04-17 RX ORDER — HYDROCHLOROTHIAZIDE 12.5 MG/1
TABLET ORAL
Qty: 30 TABLET | Refills: 0 | Status: SHIPPED | OUTPATIENT
Start: 2024-04-17 | End: 2024-04-29

## 2024-04-29 ENCOUNTER — MYC REFILL (OUTPATIENT)
Dept: FAMILY MEDICINE | Facility: CLINIC | Age: 59
End: 2024-04-29
Payer: COMMERCIAL

## 2024-04-29 DIAGNOSIS — I10 ESSENTIAL HYPERTENSION WITH GOAL BLOOD PRESSURE LESS THAN 140/90: ICD-10-CM

## 2024-04-30 RX ORDER — HYDROCHLOROTHIAZIDE 12.5 MG/1
TABLET ORAL
Qty: 30 TABLET | Refills: 0 | Status: SHIPPED | OUTPATIENT
Start: 2024-04-30 | End: 2024-05-02

## 2024-04-30 RX ORDER — LISINOPRIL 20 MG/1
TABLET ORAL
Qty: 135 TABLET | Refills: 0 | Status: SHIPPED | OUTPATIENT
Start: 2024-04-30 | End: 2024-06-25

## 2024-04-30 RX ORDER — ATENOLOL 100 MG/1
TABLET ORAL
Qty: 30 TABLET | Refills: 0 | Status: SHIPPED | OUTPATIENT
Start: 2024-04-30 | End: 2024-05-02

## 2024-05-02 ENCOUNTER — MYC REFILL (OUTPATIENT)
Dept: FAMILY MEDICINE | Facility: CLINIC | Age: 59
End: 2024-05-02
Payer: COMMERCIAL

## 2024-05-02 DIAGNOSIS — I10 ESSENTIAL HYPERTENSION WITH GOAL BLOOD PRESSURE LESS THAN 140/90: ICD-10-CM

## 2024-05-02 RX ORDER — ATENOLOL 100 MG/1
TABLET ORAL
Qty: 90 TABLET | Refills: 0 | Status: SHIPPED | OUTPATIENT
Start: 2024-05-02 | End: 2024-06-25

## 2024-05-02 RX ORDER — ATENOLOL 100 MG/1
TABLET ORAL
Qty: 30 TABLET | Refills: 0 | OUTPATIENT
Start: 2024-05-02

## 2024-05-02 RX ORDER — HYDROCHLOROTHIAZIDE 12.5 MG/1
TABLET ORAL
Qty: 90 TABLET | Refills: 0 | Status: SHIPPED | OUTPATIENT
Start: 2024-05-02 | End: 2024-08-16

## 2024-05-02 RX ORDER — HYDROCHLOROTHIAZIDE 12.5 MG/1
TABLET ORAL
Qty: 30 TABLET | Refills: 0 | OUTPATIENT
Start: 2024-05-02

## 2024-06-25 ENCOUNTER — OFFICE VISIT (OUTPATIENT)
Dept: FAMILY MEDICINE | Facility: CLINIC | Age: 59
End: 2024-06-25
Payer: COMMERCIAL

## 2024-06-25 VITALS
DIASTOLIC BLOOD PRESSURE: 74 MMHG | WEIGHT: 185 LBS | BODY MASS INDEX: 25.9 KG/M2 | RESPIRATION RATE: 12 BRPM | OXYGEN SATURATION: 95 % | HEIGHT: 71 IN | HEART RATE: 50 BPM | SYSTOLIC BLOOD PRESSURE: 130 MMHG | TEMPERATURE: 98.1 F

## 2024-06-25 DIAGNOSIS — Z12.11 SCREEN FOR COLON CANCER: ICD-10-CM

## 2024-06-25 DIAGNOSIS — Z00.00 ROUTINE GENERAL MEDICAL EXAMINATION AT A HEALTH CARE FACILITY: Primary | ICD-10-CM

## 2024-06-25 DIAGNOSIS — I10 ESSENTIAL HYPERTENSION WITH GOAL BLOOD PRESSURE LESS THAN 140/90: ICD-10-CM

## 2024-06-25 DIAGNOSIS — L30.9 DERMATITIS: ICD-10-CM

## 2024-06-25 DIAGNOSIS — Z12.83 SKIN CANCER SCREENING: ICD-10-CM

## 2024-06-25 LAB
ERYTHROCYTE [DISTWIDTH] IN BLOOD BY AUTOMATED COUNT: 11.8 % (ref 10–15)
HCT VFR BLD AUTO: 43.8 % (ref 40–53)
HGB BLD-MCNC: 14.7 G/DL (ref 13.3–17.7)
MCH RBC QN AUTO: 32.3 PG (ref 26.5–33)
MCHC RBC AUTO-ENTMCNC: 33.6 G/DL (ref 31.5–36.5)
MCV RBC AUTO: 96 FL (ref 78–100)
PLATELET # BLD AUTO: 207 10E3/UL (ref 150–450)
RBC # BLD AUTO: 4.55 10E6/UL (ref 4.4–5.9)
WBC # BLD AUTO: 10.3 10E3/UL (ref 4–11)

## 2024-06-25 PROCEDURE — 85027 COMPLETE CBC AUTOMATED: CPT | Performed by: PHYSICIAN ASSISTANT

## 2024-06-25 PROCEDURE — 80053 COMPREHEN METABOLIC PANEL: CPT | Performed by: PHYSICIAN ASSISTANT

## 2024-06-25 PROCEDURE — 82043 UR ALBUMIN QUANTITATIVE: CPT | Performed by: PHYSICIAN ASSISTANT

## 2024-06-25 PROCEDURE — 99396 PREV VISIT EST AGE 40-64: CPT | Performed by: PHYSICIAN ASSISTANT

## 2024-06-25 PROCEDURE — 36415 COLL VENOUS BLD VENIPUNCTURE: CPT | Performed by: PHYSICIAN ASSISTANT

## 2024-06-25 PROCEDURE — 82570 ASSAY OF URINE CREATININE: CPT | Performed by: PHYSICIAN ASSISTANT

## 2024-06-25 RX ORDER — LISINOPRIL 20 MG/1
TABLET ORAL
Qty: 135 TABLET | Refills: 3 | Status: SHIPPED | OUTPATIENT
Start: 2024-06-25

## 2024-06-25 RX ORDER — TRIAMCINOLONE ACETONIDE 1 MG/G
CREAM TOPICAL 2 TIMES DAILY
Qty: 30 G | Refills: 0 | Status: SHIPPED | OUTPATIENT
Start: 2024-06-25

## 2024-06-25 RX ORDER — ATENOLOL 100 MG/1
TABLET ORAL
Qty: 90 TABLET | Refills: 3 | Status: SHIPPED | OUTPATIENT
Start: 2024-06-25

## 2024-06-25 SDOH — HEALTH STABILITY: PHYSICAL HEALTH: ON AVERAGE, HOW MANY DAYS PER WEEK DO YOU ENGAGE IN MODERATE TO STRENUOUS EXERCISE (LIKE A BRISK WALK)?: 2 DAYS

## 2024-06-25 ASSESSMENT — PAIN SCALES - GENERAL: PAINLEVEL: NO PAIN (0)

## 2024-06-25 ASSESSMENT — SOCIAL DETERMINANTS OF HEALTH (SDOH): HOW OFTEN DO YOU GET TOGETHER WITH FRIENDS OR RELATIVES?: ONCE A WEEK

## 2024-06-25 NOTE — PROGRESS NOTES
"Preventive Care Visit  [facility], [facility]  Jun 25, 2024      Assessment & Plan     Routine general medical examination at a health care facility  Age and gender appropriate preventive care and screenings are discussed.  Particular attention to personal preventive care and age appropriate lifestyle including the incorporation of healthy diet and physical activity is made       Essential hypertension with goal blood pressure less than 140/90  Stable   - Albumin Random Urine Quantitative with Creat Ratio; Future  - lisinopril (ZESTRIL) 20 MG tablet; TAKE ONE AND ONE-HALF TABLETS DAILY  - atenolol (TENORMIN) 100 MG tablet; TAKE 1 TABLET DAILY  - Lipid Profile; Future  - Comprehensive metabolic panel; Future  - CBC with platelets; Future  - Albumin Random Urine Quantitative with Creat Ratio  - Comprehensive metabolic panel  - CBC with platelets    Screen for colon cancer    - Colonoscopy Screening  Referral; Future    Dermatitis    - triamcinolone (KENALOG) 0.1 % external cream; Apply topically 2 times daily    Skin cancer screening    - Adult Dermatology  Referral; Future          BMI  Estimated body mass index is 25.98 kg/m  as calculated from the following:    Height as of this encounter: 1.797 m (5' 10.75\").    Weight as of this encounter: 83.9 kg (185 lb).             Shanti Peña is a 59 year old, presenting for the following:  Physical (Not fasting.)        6/25/2024     1:39 PM   Additional Questions   Roomed by Dirk URRUTIA - Samson GARY   Accompanied by Self        Health Care Directive  Patient does not have a Health Care Directive or Living Will: Discussed advance care planning with patient; information given to patient to review.    HPI            6/25/2024   General Health   How would you rate your overall physical health? Good   Feel stress (tense, anxious, or unable to sleep) Not at all            6/25/2024   Nutrition "   Three or more servings of calcium each day? Yes   Diet: Regular (no restrictions)   How many servings of fruit and vegetables per day? (!) 2-3   How many sweetened beverages each day? (!) 2            2024   Exercise   Days per week of moderate/strenous exercise 2 days      (!) EXERCISE CONCERN      2024   Social Factors   Frequency of gathering with friends or relatives Once a week   Worry food won't last until get money to buy more No   Food not last or not have enough money for food? No   Do you have housing? (Housing is defined as stable permanent housing and does not include staying ouside in a car, in a tent, in an abandoned building, in an overnight shelter, or couch-surfing.) Yes   Are you worried about losing your housing? No   Lack of transportation? No   Unable to get utilities (heat,electricity)? No            2024   Fall Risk   Fallen 2 or more times in the past year? No   Trouble with walking or balance? No             2024   Dental   Dentist two times every year? (!) NO            2024   TB Screening   Were you born outside of the [place]? No            Today's PHQ-2 Score:       2024     1:31 PM   PHQ-2 (  [employer])   Q1: Little interest or pleasure in doing things 0   Q2: Feeling down, depressed or hopeless 0   PHQ-2 Score 0   Q1: Little interest or pleasure in doing things Not at all   Q2: Feeling down, depressed or hopeless Not at all   PHQ-2 Score 0           2024   Substance Use   Alcohol more than 3/day or more than 7/wk No   Do you use any other substances recreationally? (!) CANNABIS PRODUCTS    (!) PRESCRIPTION DRUGS       Multiple values from one day are sorted in reverse-chronological order     Social History     Tobacco Use    Smoking status: Former     Current packs/day: 0.00     Average packs/day: 0.5 packs/day for 20.9 years (10.4 ttl pk-yrs)     Types: Cigarettes     Start date: 1992     Quit date: 2012     Years since quittin.5     Smokeless tobacco: Never   Vaping Use    Vaping status: Never Used   Substance Use Topics    Alcohol use: No     Comment: stopped drinking     Drug use: No           6/25/2024   STI Screening   New sexual partner(s) since last STI/HIV test? No      Last PSA:   PSA   Date Value Ref Range Status   07/27/2009 0.24 0 - 4 ug/L Final     ASCVD Risk   The 10-year ASCVD risk score (Seymour ROSAS, et al., 2019) is: 14.6%    Values used to calculate the score:      Age: 59 years      Sex: Male      Is Non- : No      Diabetic: No      Tobacco smoker: No      Systolic Blood Pressure: 130 mmHg      Is BP treated: Yes      HDL Cholesterol: 39 mg/dL      Total Cholesterol: 226 mg/dL           Reviewed and updated as needed this visit by Provider                    BP Readings from Last 3 Encounters:   06/25/24 130/74   07/22/23 116/64   12/09/22 135/67    Wt Readings from Last 3 Encounters:   06/25/24 83.9 kg (185 lb)   12/09/22 86.7 kg (191 lb 1.6 oz)   05/29/22 88.5 kg (195 lb)                  Patient Active Problem List   Diagnosis    Hyperlipidemia with target LDL less than 100    Hypertension goal BP (blood pressure) < 140/90    Elevated LFTs    Impaired fasting glucose    Fatty liver disease    CA - bladder cancer    Bladder cancer (H)    Cough    SBO (small bowel obstruction) (H)    Anxiety    COPD (chronic obstructive pulmonary disease) (H)     Past Surgical History:   Procedure Laterality Date    ABDOMEN SURGERY  07/30/2013    Radical Cystectomy    COLONOSCOPY  December 2012    CYSTOSCOPY, RETROGRADES, COMBINED  12/18/2012    Procedure: COMBINED CYSTOSCOPY, RETROGRADES;;  Surgeon: Von Zee MD;  Location:  OR    CYSTOSCOPY, RETROGRADES, COMBINED      CYSTOSCOPY, TRANSURETHRAL RESECTION (TUR) PROSTATE, COMBINED      CYSTOSCOPY, TRANSURETHRAL RESECTION (TUR) TUMOR BLADDER, COMBINED  12/18/2012    Procedure: COMBINED CYSTOSCOPY, TRANSURETHRAL RESECTION (TUR) TUMOR BLADDER;  CYSTOSCOPY,  "BILATERAL RETROGRADES AND TRANSURETHRAL RESECTION BLADDER TUMOR  (MITOMYCIN TREATMENT);  Surgeon: Von Zee MD;  Location: [facility] OR    DAVINCI CYSTECTOMY BLADDER RADICAL, ILEAL DIVERSION, COMBINED  2013    Procedure: COMBINED DAVINCI CYSTECTOMY BLADDER RADICAL, ILEAL DIVERSION;  ROBOTIC ASSISTED CYSTOPROSTATECTOMY  WITH ILEO DIVERSION;  Surgeon: Von Zee MD;  Location: [facility] OR    GI SURGERY      ileal diversion    HC TOOTH EXTRACTION W/FORCEP      IR CHEST PORT PLACEMENT > 5 YRS OF AGE RIGHT         Social History     Tobacco Use    Smoking status: Former     Current packs/day: 0.00     Average packs/day: 0.5 packs/day for 20.9 years (10.4 ttl pk-yrs)     Types: Cigarettes     Start date: 1992     Quit date: 2012     Years since quittin.5    Smokeless tobacco: Never   Substance Use Topics    Alcohol use: No     Comment: stopped drinking      Family History   Problem Relation Age of Onset    C.A.D. Father         CABG, LATE 50s    Family History Negative Father     Hypertension Father     Family History Negative Mother     Hypertension Maternal Grandfather     Hypertension Maternal Grandmother          Current Outpatient Medications   Medication Sig Dispense Refill    Alcohol Swabs (B-D SINGLE USE SWABS REGULAR) PADS       atenolol (TENORMIN) 100 MG tablet TAKE 1 TABLET DAILY 90 tablet 3    BD INSULIN SYRINGE U/F 30G X 1/2\" 0.5 ML miscellaneous       cyclobenzaprine (FLEXERIL) 10 MG tablet TAKE 1 TABLET THREE TIMES A DAY AS NEEDED FOR MUSCLE SPASMS 30 tablet 35    hydroCHLOROthiazide 12.5 MG tablet TAKE 1 TABLET DAILY 90 tablet 0    lisinopril (ZESTRIL) 20 MG tablet TAKE ONE AND ONE-HALF TABLETS DAILY 135 tablet 3    triamcinolone (KENALOG) 0.1 % external cream Apply topically 2 times daily 30 g 0     No Known Allergies  Recent Labs   Lab Test 22  1555 10/14/20  1109 19  0839 19  0902   * 165* 133*  --    HDL 39* 41 43  --    TRIG 129 132 130  --    ALT 30 40  --  " "39   CR 0.96 0.77  --  0.86   GFRESTIMATED >90 >90  --  >90   GFRESTBLACK  --  >90  --  >90   POTASSIUM 4.3 4.1  --  3.6   TSH 2.44  --   --   --           Review of Systems  CONSTITUTIONAL: NEGATIVE for fever, chills, change in weight  INTEGUMENTARY/SKIN: NEGATIVE for worrisome rashes, moles or lesions  EYES: NEGATIVE for vision changes or irritation  ENT/MOUTH: NEGATIVE for ear, mouth and throat problems  RESP: NEGATIVE for significant cough or SOB  BREAST: NEGATIVE for masses, tenderness or discharge  CV: NEGATIVE for chest pain, palpitations or peripheral edema  GI: NEGATIVE for nausea, abdominal pain, heartburn, or change in bowel habits  : NEGATIVE for frequency, dysuria, or hematuria  MUSCULOSKELETAL: NEGATIVE for significant arthralgias or myalgia  NEURO: NEGATIVE for weakness, dizziness or paresthesias  ENDOCRINE: NEGATIVE for temperature intolerance, skin/hair changes  HEME: NEGATIVE for bleeding problems  PSYCHIATRIC: NEGATIVE for changes in mood or affect     Objective    Exam  /74 (BP Location: Right arm, Patient Position: Sitting, Cuff Size: Adult Regular)   Pulse 50   Temp 98.1  F (36.7  C) (Oral)   Resp 12   Ht 1.797 m (5' 10.75\")   Wt 83.9 kg (185 lb)   SpO2 95%   BMI 25.98 kg/m     Estimated body mass index is 25.98 kg/m  as calculated from the following:    Height as of this encounter: 1.797 m (5' 10.75\").    Weight as of this encounter: 83.9 kg (185 lb).    Physical Exam  GENERAL: alert and no distress  EYES: Eyes grossly normal to inspection, PERRL and conjunctivae and sclerae normal  HENT: ear canals and TM's normal, nose and mouth without ulcers or lesions  NECK: no adenopathy, no asymmetry, masses, or scars  RESP: lungs clear to auscultation - no rales, rhonchi or wheezes  CV: regular rate and rhythm, normal S1 S2, no S3 or S4, no murmur, click or rub, no peripheral edema  ABDOMEN: soft, nontender, no hepatosplenomegaly, no masses and bowel sounds normal  MS: no gross " musculoskeletal defects noted, no edema  SKIN: no suspicious lesions or rashes  NEURO: Normal strength and tone, mentation intact and speech normal  PSYCH: mentation appears normal, affect normal/bright        Signed Electronically by: Ramona Ann Aaseby-Aguilera, PA-C

## 2024-06-25 NOTE — PATIENT INSTRUCTIONS
"Patient Education   Preventive Care Advice   This is general advice we often give to help people stay healthy. Your care team may have specific advice just for you. Please talk to your care team about your own preventive care needs.  Lifestyle  Exercise at least 150 minutes each week (30 minutes a day, 5 days a week).  Do muscle strengthening activities 2 days a week. These help control your weight and prevent disease.  No smoking.  Wear sunscreen to prevent skin cancer.  Have your home tested for radon every 2 to 5 years. Radon is a colorless, odorless gas that can harm your lungs. To learn more, go to www.health.[facility].[place].us and search for \"Radon in Homes.\"  Keep guns unloaded and locked up in a safe place like a safe or gun vault, or, use a gun lock and hide the keys. Always lock away bullets separately. To learn more, visit [employer].gov and search for \"safe gun storage.\"  Nutrition  Eat 5 or more servings of fruits and vegetables each day.  Try wheat bread, brown rice and whole grain pasta (instead of white bread, rice, and pasta).  Get enough calcium and vitamin D. Check the label on foods and aim for 100% of the RDA (recommended daily allowance).  Regular exams  Have a dental exam and cleaning every 6 months.  See your health care team every year to talk about:  Any changes in your health.  Any medicines your care team has prescribed.  Preventive care, family planning, and ways to prevent chronic diseases.  Shots (vaccines)   HPV shots (up to age 26), if you've never had them before.  Hepatitis B shots (up to age 59), if you've never had them before.  COVID-19 shot: Get this shot when it's due.  Flu shot: Get a flu shot every year.  Tetanus shot: Get a tetanus shot every 10 years.  Pneumococcal, hepatitis A, and RSV shots: Ask your care team if you need these based on your risk.  Shingles shot (for age 50 and up).  General health tests  Diabetes screening:  Starting at age 35, Get screened for diabetes at least " every 3 years.  If you are younger than age 35, ask your care team if you should be screened for diabetes.  Cholesterol test: At age 39, start having a cholesterol test every 5 years, or more often if advised.  Bone density scan (DEXA): At age 50, ask your care team if you should have this scan for osteoporosis (brittle bones).  Hepatitis C: Get tested at least once in your life.  Abdominal aortic aneurysm screening: Talk to your doctor about having this screening if you:  Have ever smoked; and  Are biologically male; and  Are between the ages of 65 and 75.  STIs (sexually transmitted infections)  Before age 24: Ask your care team if you should be screened for STIs.  After age 24: Get screened for STIs if you're at risk. You are at risk for STIs (including HIV) if:  You are sexually active with more than one person.  You don't use condoms every time.  You or a partner was diagnosed with a sexually transmitted infection.  If you are at risk for HIV, ask about PrEP medicine to prevent HIV.  Get tested for HIV at least once in your life, whether you are at risk for HIV or not.  Cancer screening tests  Cervical cancer screening: If you have a cervix, begin getting regular cervical cancer screening tests at age 21. Most people who have regular screenings with normal results can stop after age 65. Talk about this with your provider.  Breast cancer scan (mammogram): If you've ever had breasts, begin having regular mammograms starting at age 40. This is a scan to check for breast cancer.  Colon cancer screening: It is important to start screening for colon cancer at age 45.  Have a colonoscopy test every 10 years (or more often if you're at risk) Or, ask your provider about stool tests like a FIT test every year or Cologuard test every 3 years.  To learn more about your testing options, visit: www.[employer]/006273.pdf.  For help making a decision, visit: niesha/vo87325.  Prostate cancer screening test: If you have a  prostate and are age 55 to 69, ask your provider if you would benefit from a yearly prostate cancer screening test.  Lung cancer screening: If you are a current or former smoker age 50 to 80, ask your care team if ongoing lung cancer screenings are right for you.  For informational purposes only. Not to replace the advice of your health care provider. Copyright   2023 [facility]. All rights reserved. Clinically reviewed by the  [employer] [facility]. [employer] 888964 - REV 04/24.  Substance Use Disorder: Care Instructions  Overview     You can improve your life and health by stopping your use of alcohol or drugs. When you don't drink or use drugs, you may feel and sleep better. You may get along better with your family, friends, and coworkers. There are medicines and programs that can help with substance use disorder.  How can you care for yourself at home?  Here are some ways to help you stay sober and prevent relapse.  If you have been given medicine to help keep you sober or reduce your cravings, be sure to take it exactly as prescribed.  Talk to your doctor about programs that can help you stop using drugs or drinking alcohol.  Do not keep alcohol or drugs in your home.  Plan ahead. Think about what you'll say if other people ask you to drink or use drugs. Try not to spend time with people who drink or use drugs.  Use the time and money spent on drinking or drugs to do something that's important to you.  Preventing a relapse  Have a plan to deal with relapse. Learn to recognize changes in your thinking that lead you to drink or use drugs. Get help before you start to drink or use drugs again.  Try to stay away from situations, friends, or places that may lead you to drink or use drugs.  If you feel the need to drink alcohol or use drugs again, seek help right away. Call a trusted friend or family member. Some people get support from organizations such as Narcotics Anonymous or [employer]  Recovery or from treatment facilities.  If you relapse, get help as soon as you can. Some people make a plan with another person that outlines what they want that person to do for them if they relapse. The plan usually includes how to handle the relapse and who to notify in case of relapse.  Don't give up. Remember that a relapse doesn't mean that you have failed. Use the experience to learn the triggers that lead you to drink or use drugs. Then quit again. Recovery is a lifelong process. Many people have several relapses before they are able to quit for good.  Follow-up care is a key part of your treatment and safety. Be sure to make and go to all appointments, and call your doctor if you are having problems. It's also a good idea to know your test results and keep a list of the medicines you take.  When should you call for help?   Call 911  anytime you think you may need emergency care. For example, call if you or someone else:    Has overdosed or has withdrawal signs. Be sure to tell the emergency workers that you are or someone else is using or trying to quit using drugs. Overdose or withdrawal signs may include:  Losing consciousness.  Seizure.  Seeing or hearing things that aren't there (hallucinations).     Is thinking or talking about suicide or harming others.   Where to get help 24 hours a day, 7 days a week   If you or someone you know talks about suicide, self-harm, a mental health crisis, a substance use crisis, or any other kind of emotional distress, get help right away. You can:    Call the Suicide and Crisis Lifeline at 988.     Call 8-252-830-TALK (1-664.666.5689).     Text HOME to 997235 to access the Crisis Text Line.   Consider saving these numbers in your phone.  Go to [employer] for more information or to chat online.  Call your doctor now or seek immediate medical care if:    You are having withdrawal symptoms. These may include nausea or vomiting, sweating, shakiness, and anxiety.  "  Watch closely for changes in your health, and be sure to contact your doctor if:    You have a relapse.     You need more help or support to stop.   Where can you learn more?  Go to https://www.[employer].net/patiented  Enter H573 in the search box to learn more about \"Substance Use Disorder: Care Instructions.\"  Current as of: November 15, 2023               Content Version: 14.0    3830-2230 [employer].   Care instructions adapted under license by your healthcare professional. If you have questions about a medical condition or this instruction, always ask your healthcare professional. [employer] disclaims any warranty or liability for your use of this information.         "

## 2024-06-26 LAB
ALBUMIN SERPL BCG-MCNC: 4.6 G/DL (ref 3.5–5.2)
ALP SERPL-CCNC: 70 U/L (ref 40–150)
ALT SERPL W P-5'-P-CCNC: 47 U/L (ref 0–70)
ANION GAP SERPL CALCULATED.3IONS-SCNC: 11 MMOL/L (ref 7–15)
AST SERPL W P-5'-P-CCNC: 36 U/L (ref 0–45)
BILIRUB SERPL-MCNC: 0.6 MG/DL
BUN SERPL-MCNC: 16.7 MG/DL (ref 8–23)
CALCIUM SERPL-MCNC: 9.4 MG/DL (ref 8.8–10.2)
CHLORIDE SERPL-SCNC: 99 MMOL/L (ref 98–107)
CREAT SERPL-MCNC: 0.94 MG/DL (ref 0.67–1.17)
CREAT UR-MCNC: 53.3 MG/DL
DEPRECATED HCO3 PLAS-SCNC: 24 MMOL/L (ref 22–29)
EGFRCR SERPLBLD CKD-EPI 2021: >90 ML/MIN/1.73M2
GLUCOSE SERPL-MCNC: 101 MG/DL (ref 70–99)
MICROALBUMIN UR-MCNC: <12 MG/L
MICROALBUMIN/CREAT UR: NORMAL MG/G{CREAT}
POTASSIUM SERPL-SCNC: 4.5 MMOL/L (ref 3.4–5.3)
PROT SERPL-MCNC: 8 G/DL (ref 6.4–8.3)
SODIUM SERPL-SCNC: 134 MMOL/L (ref 135–145)

## 2024-06-28 ENCOUNTER — LAB (OUTPATIENT)
Dept: LAB | Facility: CLINIC | Age: 59
End: 2024-06-28
Payer: COMMERCIAL

## 2024-06-28 DIAGNOSIS — I10 ESSENTIAL HYPERTENSION WITH GOAL BLOOD PRESSURE LESS THAN 140/90: ICD-10-CM

## 2024-06-28 LAB
CHOLEST SERPL-MCNC: 208 MG/DL
FASTING STATUS PATIENT QL REPORTED: YES
HDLC SERPL-MCNC: 37 MG/DL
LDLC SERPL CALC-MCNC: 148 MG/DL
NONHDLC SERPL-MCNC: 171 MG/DL
TRIGL SERPL-MCNC: 116 MG/DL

## 2024-06-28 PROCEDURE — 80061 LIPID PANEL: CPT

## 2024-06-28 PROCEDURE — 36415 COLL VENOUS BLD VENIPUNCTURE: CPT

## 2024-08-15 DIAGNOSIS — I10 ESSENTIAL HYPERTENSION WITH GOAL BLOOD PRESSURE LESS THAN 140/90: ICD-10-CM

## 2024-08-16 RX ORDER — HYDROCHLOROTHIAZIDE 12.5 MG/1
TABLET ORAL
Qty: 90 TABLET | Refills: 2 | Status: SHIPPED | OUTPATIENT
Start: 2024-08-16

## 2024-12-25 ENCOUNTER — PATIENT OUTREACH (OUTPATIENT)
Dept: CARE COORDINATION | Facility: CLINIC | Age: 59
End: 2024-12-25
Payer: COMMERCIAL

## 2025-04-10 ENCOUNTER — OFFICE VISIT (OUTPATIENT)
Dept: DERMATOLOGY | Facility: CLINIC | Age: 60
End: 2025-04-10
Attending: PHYSICIAN ASSISTANT
Payer: COMMERCIAL

## 2025-04-10 DIAGNOSIS — D23.9 DERMAL NEVUS: Primary | ICD-10-CM

## 2025-04-10 DIAGNOSIS — L82.1 SEBORRHEIC KERATOSES: ICD-10-CM

## 2025-04-10 DIAGNOSIS — L81.4 LENTIGO: ICD-10-CM

## 2025-04-10 DIAGNOSIS — C44.319 BASAL CELL CARCINOMA (BCC) OF RIGHT CHEEK: ICD-10-CM

## 2025-04-10 DIAGNOSIS — D18.01 ANGIOMA OF SKIN: ICD-10-CM

## 2025-04-10 DIAGNOSIS — Z12.83 SKIN CANCER SCREENING: ICD-10-CM

## 2025-04-10 ASSESSMENT — PAIN SCALES - GENERAL: PAINLEVEL_OUTOF10: NO PAIN (0)

## 2025-04-10 NOTE — LETTER
[address]  [id number] [phone]        4/10/2025       Casey Valdez [address] [place] [address]      Dear Casey:    You are scheduled for Mohs Surgery on: ***.    Please check in at [facility], [address].     You don't need to arrive more than 5-10 minutes prior to your appointment time.     Be sure to eat a good breakfast and bathe and wash your hair prior to surgery.     If you are taking any anti-coagulants that are prescribed by your Doctor (such as Coumadin/Warfarin, Plavix, Aspirin, Ibuprofen), please continue taking them.     However, if you are taking anti-coagulants over the counter without a Doctor's order for a medical condition, please discontinue them 10 days prior to surgery.     Please wear loose comfortable clothing as it could possibly be 4-6 hours until your surgery is completed depending upon how many layers of tissue need to be removed.      Please bring  with you if this is above your neck.     If you need any mobility assistance (getting on the exam chair or toilet) please bring a caregiver, family member, or staff member to assist you. We are not equipped to transfer patients.      Thank you,    GHASSAN Ferrera MD

## 2025-04-10 NOTE — PROGRESS NOTES
Casey Benitez , a 60 year old year old male patient, I was asked to see by R Aaseby-Aguilera right cheek.  Patient has no other skin complaints today.  Remainder of the HPI, Meds, PMH, Allergies, FH, and SH was reviewed in chart.      Past Medical History:   Diagnosis Date    Bladder cancer (H)     COPD (chronic obstructive pulmonary disease) (H) 09/16/2016    Elevated LFTs     Gastro-oesophageal reflux disease     heartburn    GERD (gastroesophageal reflux disease)     Hyperlipidemia LDL goal < 100     Hypertension     Hypertension goal BP (blood pressure) < 140/90     Impaired fasting glucose     Liver disease     fatty liver disease    Tobacco abuse     Wheezes        Past Surgical History:   Procedure Laterality Date    ABDOMEN SURGERY  07/30/2013    Radical Cystectomy    COLONOSCOPY  December 2012    CYSTOSCOPY, RETROGRADES, COMBINED  12/18/2012    Procedure: COMBINED CYSTOSCOPY, RETROGRADES;;  Surgeon: Von Zee MD;  Location: [facility]    CYSTOSCOPY, RETROGRADES, COMBINED      CYSTOSCOPY, TRANSURETHRAL RESECTION (TUR) PROSTATE, COMBINED      CYSTOSCOPY, TRANSURETHRAL RESECTION (TUR) TUMOR BLADDER, COMBINED  12/18/2012    Procedure: COMBINED CYSTOSCOPY, TRANSURETHRAL RESECTION (TUR) TUMOR BLADDER;  CYSTOSCOPY, BILATERAL RETROGRADES AND TRANSURETHRAL RESECTION BLADDER TUMOR  (MITOMYCIN TREATMENT);  Surgeon: Von Zee MD;  Location: [facility]    DAVINCI CYSTECTOMY BLADDER RADICAL, ILEAL DIVERSION, COMBINED  7/30/2013    Procedure: COMBINED DAVINCI CYSTECTOMY BLADDER RADICAL, ILEAL DIVERSION;  ROBOTIC ASSISTED CYSTOPROSTATECTOMY  WITH ILEO DIVERSION;  Surgeon: Von Zee MD;  Location:  OR    GI SURGERY      ileal diversion    HC TOOTH EXTRACTION W/FORCEP      IR CHEST PORT PLACEMENT > 5 YRS OF AGE RIGHT          Family History   Problem Relation Age of Onset    C.A.D. Father         CABG, LATE 50s    Family History Negative Father     Hypertension Father     Family History Negative Mother      Hypertension Maternal Grandfather     Hypertension Maternal Grandmother        Social History     Socioeconomic History    Marital status:      Spouse name: Not on file    Number of children: 1    Years of education: Not on file    Highest education level: Not on file   Occupational History    Not on file   Tobacco Use    Smoking status: Former     Current packs/day: 0.00     Average packs/day: 0.5 packs/day for 20.9 years (10.4 ttl pk-yrs)     Types: Cigarettes     Start date: 1992     Quit date: 2012     Years since quittin.3    Smokeless tobacco: Never   Vaping Use    Vaping status: Never Used   Substance and Sexual Activity    Alcohol use: No     Comment: stopped drinking     Drug use: No    Sexual activity: Yes     Partners: Female     Birth control/protection: Male Surgical     Comment: Prostatectomy, Radical Cystecomy/Ileal Diversion   Other Topics Concern    Parent/sibling w/ CABG, MI or angioplasty before 65F 55M? No   Social History Narrative    Not on file     Social Drivers of Health     Financial Resource Strain: Low Risk  (2024)    Financial Resource Strain     Within the past 12 months, have you or your family members you live with been unable to get utilities (heat, electricity) when it was really needed?: No   Food Insecurity: Low Risk  (2024)    Food Insecurity     Within the past 12 months, did you worry that your food would run out before you got money to buy more?: No     Within the past 12 months, did the food you bought just not last and you didn t have money to get more?: No   Transportation Needs: Low Risk  (2024)    Transportation Needs     Within the past 12 months, has lack of transportation kept you from medical appointments, getting your medicines, non-medical meetings or appointments, work, or from getting things that you need?: No   Physical Activity: Unknown (2024)    Exercise Vital Sign     Days of Exercise per Week: 2 days     Minutes of  "Exercise per Session: Not on file   Stress: No Stress Concern Present (6/25/2024)    [place] [employer] - Occupational Stress Questionnaire     Feeling of Stress : Not at all   Social Connections: Unknown (6/25/2024)    Social Connection and Isolation Panel [NHANES]     Frequency of Communication with Friends and Family: Not on file     Frequency of Social Gatherings with Friends and Family: Once a week     Attends Am[employer] Services: Not on file     Active Member of Clubs or Organizations: Not on file     Attends Club or Organization Meetings: Not on file     Marital Status: Not on file   Interpersonal Safety: Low Risk  (6/25/2024)    Interpersonal Safety     Do you feel physically and emotionally safe where you currently live?: Yes     Within the past 12 months, have you been hit, slapped, kicked or otherwise physically hurt by someone?: No     Within the past 12 months, have you been humiliated or emotionally abused in other ways by your partner or ex-partner?: No   Housing Stability: Low Risk  (6/25/2024)    Housing Stability     Do you have housing? : Yes     Are you worried about losing your housing?: No       Outpatient Encounter Medications as of 4/10/2025   Medication Sig Dispense Refill    Alcohol Swabs (B-D SINGLE USE SWABS REGULAR) PADS       atenolol (TENORMIN) 100 MG tablet TAKE 1 TABLET DAILY 90 tablet 3    BD INSULIN SYRINGE U/F 30G X 1/2\" 0.5 ML miscellaneous       cyclobenzaprine (FLEXERIL) 10 MG tablet TAKE 1 TABLET THREE TIMES A DAY AS NEEDED FOR MUSCLE SPASMS 30 tablet 35    hydroCHLOROthiazide 12.5 MG tablet TAKE 1 TABLET DAILY 90 tablet 2    lisinopril (ZESTRIL) 20 MG tablet TAKE ONE AND ONE-HALF TABLETS DAILY 135 tablet 3    triamcinolone (KENALOG) 0.1 % external cream Apply topically 2 times daily (Patient taking differently: Apply topically 2 times daily as needed.) 30 g 0     No facility-administered encounter medications on file as of 4/10/2025.             Review Of " Systems  Skin: As above  Eyes: negative  Ears/Nose/Throat: negative  Respiratory: No shortness of breath, dyspnea on exertion, cough, or hemoptysis  Cardiovascular: negative  Gastrointestinal: negative  Genitourinary: negative  Musculoskeletal: negative  Neurologic: negative  Psychiatric: negative  Hematologic/Lymphatic/Immunologic: negative  Endocrine: negative      O:   NAD, WDWN, Alert & Oriented, Mood & Affect wnl, Vitals stable   General appearance josé miguel ii   Vitals stable   Alert, oriented and in no acute distress   R zygoma 7mm pink pearly papule    Stuck on papules and brown macules on trunk and ext    Red papules on trunk   Flesh colored papules on trunk      The remainder of the full exam was normal; the following areas were examined:  conjunctiva/lids, , neck, peripheral vascular system, abdomen, lymph nodes, digits/nails, eccrine and apocrine glands, scalp/hair, face, neck, chest, abdomen, buttocks, back, RUE, LUE, RLE, LLE       Eyes: Conjunctivae/lids:Normal     ENT: Lips, mucosa: normal    MSK:Normal    Cardiovascular: peripheral edema none    Pulm: Breathing Normal    Lymph Nodes: No Head and Neck Lymphadenopathy     Neuro/Psych: Orientation:Normal; Mood/Affect:Normal      MICRO:   R zygoma:Orthokeratosis of epidermis with a proliferation of nests of basaloid cells, with peripheral palisading and a haphazard arrangement in the center extending into the dermis, forming nodules.  The tumor cells have hyperchromatic nuclei. Poor cytoplasm and intercellular bridging.    A/P:  1. Seborrheic keratosis, lentigo, angioma, dermal nevus  2. R zygoma r/o basal cell carcinoma   TANGENTIAL BIOPSY IN HOUSE:  After consent, anesthesia with LEC and prep, tangential excision performed and dx above confirmed with frozen section histology.  No complications and routine wound care.  Patient is not on  anticoagulants and risk of bleeding discussed with patient.       I have personally reviewed all specimens and/or slides  and used them with my medical judgement to determine or confirm the final diagnosis.     Patient told result basal cell carcinoma schedule excision .      It was a pleasure speaking to Casey Benitez today.  Previous clinic  notes and pertinent laboratory tests were reviewed prior to Casey Benitez's visit.  Signs and Symptoms of skin cancer discussed with patient.  Patient encouraged to perform monthly skin exams.  UV precautions reviewed with patient.  Return to clinic next appt

## 2025-04-10 NOTE — LETTER
4/10/2025      Casey Benitez  [address] [place] [address] [address] [place] [address]      Dear Colleague,    Thank you for referring your patient, Casey Benitez, to the [facility]. Please see a copy of my visit note below.    Casey Benitez , a 60 year old year old male patient, I was asked to see by R Aaseby-Aguilera right cheek.  Patient has no other skin complaints today.  Remainder of the HPI, Meds, PMH, Allergies, FH, and SH was reviewed in chart.      Past Medical History:   Diagnosis Date     Bladder cancer (H)      COPD (chronic obstructive pulmonary disease) (H) 09/16/2016     Elevated LFTs      Gastro-oesophageal reflux disease     heartburn     GERD (gastroesophageal reflux disease)      Hyperlipidemia LDL goal < 100      Hypertension      Hypertension goal BP (blood pressure) < 140/90      Impaired fasting glucose      Liver disease     fatty liver disease     Tobacco abuse      Wheezes        Past Surgical History:   Procedure Laterality Date     ABDOMEN SURGERY  07/30/2013    Radical Cystectomy     COLONOSCOPY  December 2012     CYSTOSCOPY, RETROGRADES, COMBINED  12/18/2012    Procedure: COMBINED CYSTOSCOPY, RETROGRADES;;  Surgeon: Von Zee MD;  Location:  OR     CYSTOSCOPY, RETROGRADES, COMBINED       CYSTOSCOPY, TRANSURETHRAL RESECTION (TUR) PROSTATE, COMBINED       CYSTOSCOPY, TRANSURETHRAL RESECTION (TUR) TUMOR BLADDER, COMBINED  12/18/2012    Procedure: COMBINED CYSTOSCOPY, TRANSURETHRAL RESECTION (TUR) TUMOR BLADDER;  CYSTOSCOPY, BILATERAL RETROGRADES AND TRANSURETHRAL RESECTION BLADDER TUMOR  (MITOMYCIN TREATMENT);  Surgeon: Von Zee MD;  Location:  OR     DAVINCI CYSTECTOMY BLADDER RADICAL, ILEAL DIVERSION, COMBINED  7/30/2013    Procedure: COMBINED DAVINCI CYSTECTOMY BLADDER RADICAL, ILEAL DIVERSION;  ROBOTIC ASSISTED CYSTOPROSTATECTOMY  WITH ILEO DIVERSION;  Surgeon: Von Zee MD;  Location:  OR     GI SURGERY      ileal diversion     [facility]  EXTRACTION W/FORCEP       IR CHEST PORT PLACEMENT > 5 YRS OF AGE RIGHT          Family History   Problem Relation Age of Onset     C.A.D. Father         CABG, LATE 50s     Family History Negative Father      Hypertension Father      Family History Negative Mother      Hypertension Maternal Grandfather      Hypertension Maternal Grandmother        Social History     Socioeconomic History     Marital status:      Spouse name: Not on file     Number of children: 1     Years of education: Not on file     Highest education level: Not on file   Occupational History     Not on file   Tobacco Use     Smoking status: Former     Current packs/day: 0.00     Average packs/day: 0.5 packs/day for 20.9 years (10.4 ttl pk-yrs)     Types: Cigarettes     Start date: 1992     Quit date: 2012     Years since quittin.3     Smokeless tobacco: Never   Vaping Use     Vaping status: Never Used   Substance and Sexual Activity     Alcohol use: No     Comment: stopped drinking      Drug use: No     Sexual activity: Yes     Partners: Female     Birth control/protection: Male Surgical     Comment: Prostatectomy, Radical Cystecomy/Ileal Diversion   Other Topics Concern     Parent/sibling w/ CABG, MI or angioplasty before 65F 55M? No   Social History Narrative     Not on file     Social Drivers of Health     Financial Resource Strain: Low Risk  (2024)    Financial Resource Strain      Within the past 12 months, have you or your family members you live with been unable to get utilities (heat, electricity) when it was really needed?: No   Food Insecurity: Low Risk  (2024)    Food Insecurity      Within the past 12 months, did you worry that your food would run out before you got money to buy more?: No      Within the past 12 months, did the food you bought just not last and you didn t have money to get more?: No   Transportation Needs: Low Risk  (2024)    Transportation Needs      Within the past 12 months, has  "lack of transportation kept you from medical appointments, getting your medicines, non-medical meetings or appointments, work, or from getting things that you need?: No   Physical Activity: Unknown (6/25/2024)    Exercise Vital Sign      Days of Exercise per Week: 2 days      Minutes of Exercise per Session: Not on file   Stress: No Stress Concern Present (6/25/2024)    [place] [employer] - Occupational Stress Questionnaire      Feeling of Stress : Not at all   Social Connections: Unknown (6/25/2024)    Social Connection and Isolation Panel [NHANES]      Frequency of Communication with Friends and Family: Not on file      Frequency of Social Gatherings with Friends and Family: Once a week      Attends Jain Services: Not on file      Active Member of Clubs or Organizations: Not on file      Attends Club or Organization Meetings: Not on file      Marital Status: Not on file   Interpersonal Safety: Low Risk  (6/25/2024)    Interpersonal Safety      Do you feel physically and emotionally safe where you currently live?: Yes      Within the past 12 months, have you been hit, slapped, kicked or otherwise physically hurt by someone?: No      Within the past 12 months, have you been humiliated or emotionally abused in other ways by your partner or ex-partner?: No   Housing Stability: Low Risk  (6/25/2024)    Housing Stability      Do you have housing? : Yes      Are you worried about losing your housing?: No       Outpatient Encounter Medications as of 4/10/2025   Medication Sig Dispense Refill     Alcohol Swabs (B-D SINGLE USE SWABS REGULAR) PADS        atenolol (TENORMIN) 100 MG tablet TAKE 1 TABLET DAILY 90 tablet 3     BD INSULIN SYRINGE U/F 30G X 1/2\" 0.5 ML miscellaneous        cyclobenzaprine (FLEXERIL) 10 MG tablet TAKE 1 TABLET THREE TIMES A DAY AS NEEDED FOR MUSCLE SPASMS 30 tablet 35     hydroCHLOROthiazide 12.5 MG tablet TAKE 1 TABLET DAILY 90 tablet 2     lisinopril (ZESTRIL) 20 MG " tablet TAKE ONE AND ONE-HALF TABLETS DAILY 135 tablet 3     triamcinolone (KENALOG) 0.1 % external cream Apply topically 2 times daily (Patient taking differently: Apply topically 2 times daily as needed.) 30 g 0     No facility-administered encounter medications on file as of 4/10/2025.             Review Of Systems  Skin: As above  Eyes: negative  Ears/Nose/Throat: negative  Respiratory: No shortness of breath, dyspnea on exertion, cough, or hemoptysis  Cardiovascular: negative  Gastrointestinal: negative  Genitourinary: negative  Musculoskeletal: negative  Neurologic: negative  Psychiatric: negative  Hematologic/Lymphatic/Immunologic: negative  Endocrine: negative      O:   NAD, WDWN, Alert & Oriented, Mood & Affect wnl, Vitals stable   General appearance josé miguel ii   Vitals stable   Alert, oriented and in no acute distress   R zygoma 7mm pink pearly papule    Stuck on papules and brown macules on trunk and ext    Red papules on trunk   Flesh colored papules on trunk      The remainder of the full exam was normal; the following areas were examined:  conjunctiva/lids, , neck, peripheral vascular system, abdomen, lymph nodes, digits/nails, eccrine and apocrine glands, scalp/hair, face, neck, chest, abdomen, buttocks, back, RUE, LUE, RLE, LLE       Eyes: Conjunctivae/lids:Normal     ENT: Lips, mucosa: normal    MSK:Normal    Cardiovascular: peripheral edema none    Pulm: Breathing Normal    Lymph Nodes: No Head and Neck Lymphadenopathy     Neuro/Psych: Orientation:Normal; Mood/Affect:Normal      MICRO:   R zygoma:Orthokeratosis of epidermis with a proliferation of nests of basaloid cells, with peripheral palisading and a haphazard arrangement in the center extending into the dermis, forming nodules.  The tumor cells have hyperchromatic nuclei. Poor cytoplasm and intercellular bridging.    A/P:  1. Seborrheic keratosis, lentigo, angioma, dermal nevus  2. R zygoma r/o basal cell carcinoma   TANGENTIAL BIOPSY IN HOUSE:   After consent, anesthesia with LEC and prep, tangential excision performed and dx above confirmed with frozen section histology.  No complications and routine wound care.  Patient is not on  anticoagulants and risk of bleeding discussed with patient.       I have personally reviewed all specimens and/or slides and used them with my medical judgement to determine or confirm the final diagnosis.     Patient told result basal cell carcinoma schedule excision .      It was a pleasure speaking to Casey Benitez today.  Previous clinic  notes and pertinent laboratory tests were reviewed prior to Casey Benitez's visit.  Signs and Symptoms of skin cancer discussed with patient.  Patient encouraged to perform monthly skin exams.  UV precautions reviewed with patient.  Return to clinic next appt    Again, thank you for allowing me to participate in the care of your patient.        Sincerely,        Edwar Ferrera MD    Electronically signed

## 2025-04-10 NOTE — PATIENT INSTRUCTIONS
Proper skin care from [facility]:    -Eliminate harsh soaps as they strip the natural oils from the skin, often resulting in dry itchy skin ( i.e. Dial, Zest, Nigerian Spring)  -Use mild soaps such as Cetaphil or Dove Sensitive Skin in the shower. You do not need to use soap on arms, legs, and trunk every time you shower unless visibly soiled.   -Avoid hot or cold showers.  -After showering, lightly dry off and apply moisturizing within 2-3 minutes. This will help trap moisture in the skin.   -Aggressive use of a moisturizer at least 1-2 times a day to the entire body (including -Vanicream, Cetaphil, Aquaphor or Cerave) and moisturize hands after every washing.  -We recommend using moisturizers that come in a tub that needs to be scooped out, not a pump. This has more of an oil base. It will hold moisture in your skin much better than a water base moisturizer. The above recommended are non-pore clogging.      Wear a sunscreen with at least SPF 30 on your face, ears, neck and V of the chest daily. Wear sunscreen on other areas of the body if those areas are exposed to the sun throughout the day. Sunscreens can contain physical and/or chemical blockers. Physical blockers are less likely to clog pores, these include zinc oxide and titanium dioxide. Reapply every two hour and after swimming.     Sunscreen examples: https://www.ewg.org/sunscreen/    UV radiation  UVA radiation remains constant throughout the day and throughout the year. It is a longer wavelength than UVB and therefore penetrates deeper into the skin leading to immediate and delayed tanning, photoaging, and skin cancer. 70-80% of UVA and UVB radiation occurs between the hours of 10am-2pm.  UVB radiation  UVB radiation causes the most harmful effects and is more significant during the summer months. However, snow and ice can reflect UVB radiation leading to skin damage during the winter months as well. UVB radiation is responsible for tanning,  burning, inflammation, delayed erythema (pinkness), pigmentation (brown spots), and skin cancer.     I recommend self monthly full body exams and yearly full body exams with a dermatology provider. If you develop a new or changing lesion please follow up for examination. Most skin cancers are pink and scaly or pink and pearly. However, we do see blue/brown/black skin cancers.  Consider the ABCDEs of melanoma when giving yourself your monthly full body exam ( don't forget the groin, buttocks, feet, toes, etc). A-asymmetry, B-borders, C-color, D-diameter, E-elevation or evolving. If you see any of these changes please follow up in clinic. If you cannot see your back I recommend purchasing a hand held mirror to use with a larger wall mirror.       Checking for Skin Cancer  You can find cancer early by checking your skin each month. There are 3 kinds of skin cancer. They are melanoma, basal cell carcinoma, and squamous cell carcinoma. Doing monthly skin checks is the best way to find new marks or skin changes. Follow the instructions below for checking your skin.   The ABCDEs of checking moles for melanoma   Check your moles or growths for signs of melanoma using ABCDE:   Asymmetry: the sides of the mole or growth don t match  Border: the edges are ragged, notched, or blurred  Color: the color within the mole or growth varies  Diameter: the mole or growth is larger than 6 mm (size of a pencil eraser)  Evolving: the size, shape, or color of the mole or growth is changing (evolving is not shown in the images below)    Checking for other types of skin cancer  Basal cell carcinoma or squamous cell carcinoma have symptoms such as:     A spot or mole that looks different from all other marks on your skin  Changes in how an area feels, such as itching, tenderness, or pain  Changes in the skin's surface, such as oozing, bleeding, or scaliness  A sore that does not heal  New swelling or redness beyond the border of a  mole    Who s at risk?  Anyone can get skin cancer. But you are at greater risk if you have:   Fair skin, light-colored hair, or light-colored eyes  Many moles or abnormal moles on your skin  A history of sunburns from sunlight or tanning beds  A family history of skin cancer  A history of exposure to radiation or chemicals  A weakened immune system  If you have had skin cancer in the past, you are at risk for recurring skin cancer.   How to check your skin  Do your monthly skin checkups in front of a full-length mirror. Check all parts of your body, including your:   Head (ears, face, neck, and scalp)  Torso (front, back, and sides)  Arms (tops, undersides, upper, and lower armpits)  Hands (palms, backs, and fingers, including under the nails)  Buttocks and genitals  Legs (front, back, and sides)  Feet (tops, soles, toes, including under the nails, and between toes)  If you have a lot of moles, take digital photos of them each month. Make sure to take photos both up close and from a distance. These can help you see if any moles change over time.   Most skin changes are not cancer. But if you see any changes in your skin, call your doctor right away. Only he or she can diagnose a problem. If you have skin cancer, seeing your doctor can be the first step toward getting the treatment that could save your life.   [employer] last reviewed this educational content on 4/1/2019 2000-2020 [employer]. [address] [place] [address], [place] [address]. All rights reserved. This information is not intended as a substitute for professional medical care. Always follow your healthcare professional's instructions.       When should I call my doctor?  If you are worsening or not improving, please, contact us or seek urgent care as noted below.     Who should I call with questions (adults)?    [facility] [phone]  For urgent needs outside of business hours call the [facility] at  [phone] and ask for the dermatology resident on call to be paged  If this is a medical emergency and you are unable to reach an ER, Call [phone]      If you need a prescription refill, please contact your pharmacy. Refills are approved or denied by our Physicians during normal business hours, Monday through Friday.  Per office policy, refills will not be granted if you have not been seen within the past year (or sooner depending on the condition).        Wound Care Instructions     FOR SUPERFICIAL WOUNDS     [facility] [phone]                 AFTER 24 HOURS YOU SHOULD REMOVE THE BANDAGE AND BEGIN DAILY DRESSING CHANGES AS FOLLOWS:     1) Remove Dressing.     2) Clean and dry the area with tap water using a Q-tip or sterile gauze pad.     3) Apply Vaseline, Aquaphor, Polysporin ointment or Bacitracin ointment over entire wound.  Do NOT use Neosporin ointment.     4) Cover the wound with a band-aid, or a sterile non-stick gauze pad and micropore paper tape    REPEAT THESE INSTRUCTIONS AT LEAST ONCE A DAY UNTIL THE WOUND HAS COMPLETELY HEALED.    It is an old wives tale that a wound heals better when it is exposed to air and allowed to dry out. The wound will heal faster with a better cosmetic result if it is kept moist with ointment and covered with a bandage.    **Do not let the wound dry out.**    Supplies Needed:      *Cotton tipped applicators (Q-tips)    *Vaseline, Aquaphor, Polysporin or Bacitracin Ointment (NOT NEOSPORIN)    *Band-aids or non-stick gauze pads and micropore paper tape.      PATIENT INFORMATION:    During the healing process you will notice a number of changes. All wounds develop a small halo of redness surrounding the wound.  This means healing is occurring. Severe itching with extensive redness usually indicates sensitivity to the ointment or bandage tape used to dress the wound.  You should call our office if this develops.      Swelling  and/or discoloration around your  surgical site is common, particularly when performed around the eye.    All wounds normally drain.  The larger the wound the more drainage there will be.  After 7-10 days, you will notice the wound beginning to shrink and new skin will begin to grow.  The wound is healed when you can see skin has formed over the entire area.  A healed wound has a healthy, shiny look to the surface and is red to dark pink in color to normalize.  Wounds may take approximately 4-6 weeks to heal.  Larger wounds may take 6-8 weeks.  After the wound is healed you may discontinue dressing changes.    You may experience a sensation of tightness as your wound heals. This is normal and will gradually subside.    Your healed wound may be sensitive to temperature changes. This sensitivity improves with time, but if you re having a lot of discomfort, try to avoid temperature extremes.    Patients frequently experience itching after their wound appears to have healed because of the continue healing under the skin.  Plain Vaseline will help relieve the itching.      POSSIBLE COMPLICATIONS    BLEEDING:    Leave the bandage in place.  Use tightly rolled up gauze or a cloth to apply direct pressure over the bandage for 30  minutes.  Reapply pressure for an additional 30 minutes if necessary  Use additional gauze and tape to maintain pressure once the bleeding has stopped.

## 2025-04-10 NOTE — LETTER
[address]  [address]  [phone]        4/10/2025       Casey Valdez [address] [place] [address]      Dear Casey:    You are scheduled for Mohs Surgery on: 07/03/25 at 8:15 am.    Please check in at [facility], [address].     You don't need to arrive more than 5-10 minutes prior to your appointment time.     Be sure to eat a good breakfast and bathe and wash your hair prior to surgery.     If you are taking any anti-coagulants that are prescribed by your Doctor (such as Coumadin/Warfarin, Plavix, Aspirin, Ibuprofen), please continue taking them.     However, if you are taking anti-coagulants over the counter without a Doctor's order for a medical condition, please discontinue them 10 days prior to surgery.     Please wear loose comfortable clothing as it could possibly be 4-6 hours until your surgery is completed depending upon how many layers of tissue need to be removed.      Please bring  with you if this is above your neck.     If you need any mobility assistance (getting on the exam chair or toilet) please bring a caregiver, family member, or staff member to assist you. We are not equipped to transfer patients.      Thank you,    GHASSAN Ferrera MD

## 2025-05-26 ENCOUNTER — PATIENT OUTREACH (OUTPATIENT)
Dept: CARE COORDINATION | Facility: CLINIC | Age: 60
End: 2025-05-26
Payer: COMMERCIAL

## 2025-06-09 ENCOUNTER — PATIENT OUTREACH (OUTPATIENT)
Dept: CARE COORDINATION | Facility: CLINIC | Age: 60
End: 2025-06-09
Payer: COMMERCIAL

## 2025-07-02 NOTE — PROGRESS NOTES
Surgical Office Location:  [facility]  [address] [place] [id number]

## 2025-07-03 ENCOUNTER — OFFICE VISIT (OUTPATIENT)
Dept: DERMATOLOGY | Facility: CLINIC | Age: 60
End: 2025-07-03
Payer: COMMERCIAL

## 2025-07-03 DIAGNOSIS — C44.319 BASAL CELL CARCINOMA (BCC) OF RIGHT CHEEK: Primary | ICD-10-CM

## 2025-07-03 NOTE — LETTER
7/3/2025      Casey Benitez  [address] [place] [address]      Dear Colleague,    Thank you for referring your patient, Casey Benitez, to the [facility]. Please see a copy of my visit note below.    Surgical Office Location:  [facility]  [address] [place] [id number]      Casey Benitez is an extremely pleasant 60 year old year old male patient here today for evaluation and managment of basal cell carcinoma on right cheek.  Patient has no other skin complaints today.  Remainder of the HPI, Meds, PMH, Allergies, FH, and SH was reviewed in chart.      Past Medical History:   Diagnosis Date     Basal cell carcinoma      Bladder cancer (H)      COPD (chronic obstructive pulmonary disease) (H) 09/16/2016     Elevated LFTs      Gastro-oesophageal reflux disease     heartburn     GERD (gastroesophageal reflux disease)      Hyperlipidemia LDL goal < 100      Hypertension      Hypertension goal BP (blood pressure) < 140/90      Impaired fasting glucose      Liver disease     fatty liver disease     Tobacco abuse      Wheezes        Past Surgical History:   Procedure Laterality Date     ABDOMEN SURGERY  07/30/2013    Radical Cystectomy     COLONOSCOPY  December 2012     CYSTOSCOPY, RETROGRADES, COMBINED  12/18/2012    Procedure: COMBINED CYSTOSCOPY, RETROGRADES;;  Surgeon: Von Zee MD;  Location: [facility] OR     CYSTOSCOPY, RETROGRADES, COMBINED       CYSTOSCOPY, TRANSURETHRAL RESECTION (TUR) PROSTATE, COMBINED       CYSTOSCOPY, TRANSURETHRAL RESECTION (TUR) TUMOR BLADDER, COMBINED  12/18/2012    Procedure: COMBINED CYSTOSCOPY, TRANSURETHRAL RESECTION (TUR) TUMOR BLADDER;  CYSTOSCOPY, BILATERAL RETROGRADES AND TRANSURETHRAL RESECTION BLADDER TUMOR  (MITOMYCIN TREATMENT);  Surgeon: Von Zee MD;  Location: [facility] OR     DAVINCI CYSTECTOMY BLADDER RADICAL, ILEAL DIVERSION, COMBINED  7/30/2013    Procedure: COMBINED DAVINCI CYSTECTOMY BLADDER RADICAL, ILEAL  DIVERSION;  ROBOTIC ASSISTED CYSTOPROSTATECTOMY  WITH ILEO DIVERSION;  Surgeon: Von Zee MD;  Location: [facility] OR     GI SURGERY      ileal diversion     HC TOOTH EXTRACTION W/FORCEP       IR CHEST PORT PLACEMENT > 5 YRS OF AGE RIGHT          Family History   Problem Relation Age of Onset     C.A.D. Father         CABG, LATE 50s     Family History Negative Father      Hypertension Father      Family History Negative Mother      Hypertension Maternal Grandfather      Hypertension Maternal Grandmother        Social History     Socioeconomic History     Marital status:      Spouse name: Not on file     Number of children: 1     Years of education: Not on file     Highest education level: Not on file   Occupational History     Not on file   Tobacco Use     Smoking status: Former     Current packs/day: 0.00     Average packs/day: 0.5 packs/day for 20.9 years (10.4 ttl pk-yrs)     Types: Cigarettes     Start date: 1992     Quit date: 2012     Years since quittin.5     Smokeless tobacco: Never   Vaping Use     Vaping status: Never Used   Substance and Sexual Activity     Alcohol use: No     Comment: stopped drinking      Drug use: No     Sexual activity: Yes     Partners: Female     Birth control/protection: Male Surgical     Comment: Prostatectomy, Radical Cystecomy/Ileal Diversion   Other Topics Concern     Parent/sibling w/ CABG, MI or angioplasty before 65F 55M? No   Social History Narrative     Not on file     Social Drivers of Health     Financial Resource Strain: Low Risk  (2024)    Financial Resource Strain      Within the past 12 months, have you or your family members you live with been unable to get utilities (heat, electricity) when it was really needed?: No   Food Insecurity: Low Risk  (2024)    Food Insecurity      Within the past 12 months, did you worry that your food would run out before you got money to buy more?: No      Within the past 12 months, did the food you  "bought just not last and you didn t have money to get more?: No   Transportation Needs: Low Risk  (6/25/2024)    Transportation Needs      Within the past 12 months, has lack of transportation kept you from medical appointments, getting your medicines, non-medical meetings or appointments, work, or from getting things that you need?: No   Physical Activity: Unknown (6/25/2024)    Exercise Vital Sign      Days of Exercise per Week: 2 days      Minutes of Exercise per Session: Not on file   Stress: No Stress Concern Present (6/25/2024)    [employer] - Occupational Stress Questionnaire      Feeling of Stress : Not at all   Social Connections: Unknown (6/25/2024)    Social Connection and Isolation Panel [NHANES]      Frequency of Communication with Friends and Family: Not on file      Frequency of Social Gatherings with Friends and Family: Once a week      Attends Islam Services: Not on file      Active Member of Clubs or Organizations: Not on file      Attends Club or Organization Meetings: Not on file      Marital Status: Not on file   Interpersonal Safety: Low Risk  (6/25/2024)    Interpersonal Safety      Do you feel physically and emotionally safe where you currently live?: Yes      Within the past 12 months, have you been hit, slapped, kicked or otherwise physically hurt by someone?: No      Within the past 12 months, have you been humiliated or emotionally abused in other ways by your partner or ex-partner?: No   Housing Stability: Low Risk  (6/25/2024)    Housing Stability      Do you have housing? : Yes      Are you worried about losing your housing?: No       Outpatient Encounter Medications as of 7/3/2025   Medication Sig Dispense Refill     Alcohol Swabs (B-D SINGLE USE SWABS REGULAR) PADS        atenolol (TENORMIN) 100 MG tablet TAKE 1 TABLET DAILY 90 tablet 3     BD INSULIN SYRINGE U/F 30G X 1/2\" 0.5 ML miscellaneous        cyclobenzaprine (FLEXERIL) 10 MG tablet TAKE 1 " TABLET THREE TIMES A DAY AS NEEDED FOR MUSCLE SPASMS 30 tablet 35     hydroCHLOROthiazide 12.5 MG tablet TAKE 1 TABLET DAILY 90 tablet 0     lisinopril (ZESTRIL) 20 MG tablet TAKE ONE AND ONE-HALF TABLETS DAILY 135 tablet 3     triamcinolone (KENALOG) 0.1 % external cream Apply topically 2 times daily (Patient taking differently: Apply topically 2 times daily as needed.) 30 g 0     No facility-administered encounter medications on file as of 7/3/2025.             O:   NAD, WDWN, Alert & Oriented, Mood & Affect wnl, Vitals stable   General appearance normal   Vitals stable   Alert, oriented and in no acute distress     R cheek 7mm pink pearly papule       Eyes: Conjunctivae/lids:Normal     ENT: Lips, mucosa: normal    MSK:Normal    Cardiovascular: peripheral edema none    Pulm: Breathing Normal    Neuro/Psych: Orientation:Alert and Orientedx3 ; Mood/Affect:normal       A/P:  R cheek basal cell carcinoma   MOHS:   Location    The rationale for Mohs surgery was discussed with the patient and consent was obtained.  The risks and benefits as well as alternatives to therapy were discussed, in detail.  Specifically, the risks of infection, scarring, bleeding, prolonged wound healing, incomplete removal, allergy to anesthesia, nerve injury and recurrence were addressed.  Indication for Mohs was Location. Prior to the procedure, the treatment site was clearly identified and, if available, confirmed with previous photos and confirmed by the patient   All components of the Universal Protocol/PAUSE rule were completed.  The Mohs surgeon operated in two distinct and integrated capacities as the surgeon and pathologist.      The area was prepped with Betasept.  A rim of normal appearing skin was marked circumferentially around the lesion.  The area was infiltrated with local anesthesia.  The tumor was first debulked to remove all clinically apparent tumor.  An incision following the standard Mohs approach was done and the  specimen was oriented,mapped and placed in 1 block(s).  Each specimen was then chromacoded and processed in the Mohs laboratory using standard Mohs technique and submitted for frozen section histology.  Frozen section analysis showed no residual tumor but CLEAR MARGINS.      The tumor was excised using standard Mohs technique in 1 stages(s).  CLEAR MARGINS OBTAINED and Final defect size was 1.2 x 1 cm.     We discussed the options for wound management in full with the patient including risks/benefits/ possible outcomes.      REPAIR COMPLEX: Because of the tightness of the surrounding skin and Because of the size and full thickness nature of the defect, Because of the tightness of the surrounding skin, To maintain form and function, In order to avoid distortion, and Because of the proximity to the lid, a complex closure was planned. After LE anesthesia and prep, Burow's triangles were excised in the relaxed skin tension lines. The wound edges were widely undermined greater than width of the defect on both sides by dissection in the subcutaneous plane until adequate tissue mobility was obtained. Hemostasis was obtained. The wound edges were closed in a layered fashion using Vicryl and Fast Absorbing Plain Gut sutures. Postoperative length was 4.4 cm.   EBL minimal; complications none; wound care routine.  The patient was discharged in good condition and will return in one week for wound evaluation.  It was a pleasure speaking to Casey Benitez today.  Previous clinic notes and pertinent laboratory tests were reviewed prior to Casey Benitez's visit.  Signs and Symptoms of skin cancer discussed with patient.  Patient encouraged to perform monthly skin exams.  UV precautions reviewed with patient.  Risks of non-melanoma skin cancer discussed with patient   Return to clinic 6 months        Again, thank you for allowing me to participate in the care of your patient.        Sincerely,        Edwar Ferrera,  MD    Electronically signed

## 2025-07-03 NOTE — PATIENT INSTRUCTIONS
Sutured Wound Care     [facility] [phone]      No strenuous activity for 48 hours. Resume moderate activity in 48 hours. No heavy exercising until you are seen for follow up in one week.     Take Tylenol as needed for discomfort.                         Do not drink alcoholic beverages for 48 hours.     Keep the pressure bandage in place for 24 hours. If the bandage becomes blood tinged or loose, reinforce it with gauze and tape.        (Refer to the reverse side of this page for management of bleeding).    Remove pressure bandage in 24 hours     Leave the flat bandage in place until your follow up appointment.    Keep the bandage dry. Wash around it carefully.    If the tape becomes soiled or starts to come off, reinforce it with additional paper tape.    Do not smoke for 3 weeks; smoking is detrimental to wound healing.    It is normal to have swelling and bruising around the surgical site. The bruising will fade in approximately 10-14 days. Elevate the area to reduce swelling.    Numbness, itchiness and sensitivity to temperature changes can occur after surgery and may take up to 18 months to normalize.      POSSIBLE COMPLICATIONS    BLEEDING:    Leave the bandage in place.  Use tightly rolled up gauze or a cloth to apply direct pressure over the bandage for 20   minutes.  Reapply pressure for an additional 20 minutes if necessary  Call the office or go to the nearest emergency room if pressure fails to stop the bleeding.  Use additional gauze and tape to maintain pressure once the bleeding has stopped.        PAIN:    Post operative pain should slowly get better, never worse.  A severe increase in pain may indicate a problem. Call the office if this occurs.    In case of emergency phone:Dr Ferrera [phone]                   Proper skin care from [facility]:    -Eliminate harsh soaps as they strip the natural oils from the skin, often resulting in dry  itchy skin ( i.e. Dial, Zest, Kimberly Spring)  -Use mild soaps such as Cetaphil or Dove Sensitive Skin in the shower. You do not need to use soap on arms, legs, and trunk every time you shower unless visibly soiled.   -Avoid hot or cold showers.  -After showering, lightly dry off and apply moisturizing within 2-3 minutes. This will help trap moisture in the skin.   -Aggressive use of a moisturizer at least 1-2 times a day to the entire body (including -Vanicream, Cetaphil, Aquaphor or Cerave) and moisturize hands after every washing.  -We recommend using moisturizers that come in a tub that needs to be scooped out, not a pump. This has more of an oil base. It will hold moisture in your skin much better than a water base moisturizer. The above recommended are non-pore clogging.      Wear a sunscreen with at least SPF 30 on your face, ears, neck and V of the chest daily. Wear sunscreen on other areas of the body if those areas are exposed to the sun throughout the day. Sunscreens can contain physical and/or chemical blockers. Physical blockers are less likely to clog pores, these include zinc oxide and titanium dioxide. Reapply every two hour and after swimming.     Sunscreen examples: https://www.ewg.org/sunscreen/    UV radiation  UVA radiation remains constant throughout the day and throughout the year. It is a longer wavelength than UVB and therefore penetrates deeper into the skin leading to immediate and delayed tanning, photoaging, and skin cancer. 70-80% of UVA and UVB radiation occurs between the hours of 10am-2pm.  UVB radiation  UVB radiation causes the most harmful effects and is more significant during the summer months. However, snow and ice can reflect UVB radiation leading to skin damage during the winter months as well. UVB radiation is responsible for tanning, burning, inflammation, delayed erythema (pinkness), pigmentation (brown spots), and skin cancer.     I recommend self monthly full body exams  and yearly full body exams with a dermatology provider. If you develop a new or changing lesion please follow up for examination. Most skin cancers are pink and scaly or pink and pearly. However, we do see blue/brown/black skin cancers.  Consider the ABCDEs of melanoma when giving yourself your monthly full body exam ( don't forget the groin, buttocks, feet, toes, etc). A-asymmetry, B-borders, C-color, D-diameter, E-elevation or evolving. If you see any of these changes please follow up in clinic. If you cannot see your back I recommend purchasing a hand held mirror to use with a larger wall mirror.       Checking for Skin Cancer  You can find cancer early by checking your skin each month. There are 3 kinds of skin cancer. They are melanoma, basal cell carcinoma, and squamous cell carcinoma. Doing monthly skin checks is the best way to find new marks or skin changes. Follow the instructions below for checking your skin.   The ABCDEs of checking moles for melanoma   Check your moles or growths for signs of melanoma using ABCDE:   Asymmetry: the sides of the mole or growth don t match  Border: the edges are ragged, notched, or blurred  Color: the color within the mole or growth varies  Diameter: the mole or growth is larger than 6 mm (size of a pencil eraser)  Evolving: the size, shape, or color of the mole or growth is changing (evolving is not shown in the images below)    Checking for other types of skin cancer  Basal cell carcinoma or squamous cell carcinoma have symptoms such as:     A spot or mole that looks different from all other marks on your skin  Changes in how an area feels, such as itching, tenderness, or pain  Changes in the skin's surface, such as oozing, bleeding, or scaliness  A sore that does not heal  New swelling or redness beyond the border of a mole    Who s at risk?  Anyone can get skin cancer. But you are at greater risk if you have:   Fair skin, light-colored hair, or light-colored eyes  Many  moles or abnormal moles on your skin  A history of sunburns from sunlight or tanning beds  A family history of skin cancer  A history of exposure to radiation or chemicals  A weakened immune system  If you have had skin cancer in the past, you are at risk for recurring skin cancer.   How to check your skin  Do your monthly skin checkups in front of a full-length mirror. Check all parts of your body, including your:   Head (ears, face, neck, and scalp)  Torso (front, back, and sides)  Arms (tops, undersides, upper, and lower armpits)  Hands (palms, backs, and fingers, including under the nails)  Buttocks and genitals  Legs (front, back, and sides)  Feet (tops, soles, toes, including under the nails, and between toes)  If you have a lot of moles, take digital photos of them each month. Make sure to take photos both up close and from a distance. These can help you see if any moles change over time.   Most skin changes are not cancer. But if you see any changes in your skin, call your doctor right away. Only he or she can diagnose a problem. If you have skin cancer, seeing your doctor can be the first step toward getting the treatment that could save your life.   [employer] last reviewed this educational content on 4/1/2019 2000-2020 [employer]. [address] [place] [address]. All rights reserved. This information is not intended as a substitute for professional medical care. Always follow your healthcare professional's instructions.       When should I call my doctor?  If you are worsening or not improving, please, contact us or seek urgent care as noted below.     Who should I call with questions (adults)?    [facility] [phone]  For urgent needs outside of business hours call the [facility] at [phone] and ask for the dermatology resident on call to be paged  If this is a medical emergency and you are unable to reach an ER, Call [phone]      If you  need a prescription refill, please contact your pharmacy. Refills are approved or denied by our Physicians during normal business hours, Monday through Friday.  Per office policy, refills will not be granted if you have not been seen within the past year (or sooner depending on the condition).

## 2025-07-03 NOTE — PROGRESS NOTES
Casey Benitez is an extremely pleasant 60 year old year old male patient here today for evaluation and managment of basal cell carcinoma on right cheek.  Patient has no other skin complaints today.  Remainder of the HPI, Meds, PMH, Allergies, FH, and SH was reviewed in chart.      Past Medical History:   Diagnosis Date    Basal cell carcinoma     Bladder cancer (H)     COPD (chronic obstructive pulmonary disease) (H) 09/16/2016    Elevated LFTs     Gastro-oesophageal reflux disease     heartburn    GERD (gastroesophageal reflux disease)     Hyperlipidemia LDL goal < 100     Hypertension     Hypertension goal BP (blood pressure) < 140/90     Impaired fasting glucose     Liver disease     fatty liver disease    Tobacco abuse     Wheezes        Past Surgical History:   Procedure Laterality Date    ABDOMEN SURGERY  07/30/2013    Radical Cystectomy    COLONOSCOPY  December 2012    CYSTOSCOPY, RETROGRADES, COMBINED  12/18/2012    Procedure: COMBINED CYSTOSCOPY, RETROGRADES;;  Surgeon: Von Zee MD;  Location:  OR    CYSTOSCOPY, RETROGRADES, COMBINED      CYSTOSCOPY, TRANSURETHRAL RESECTION (TUR) PROSTATE, COMBINED      CYSTOSCOPY, TRANSURETHRAL RESECTION (TUR) TUMOR BLADDER, COMBINED  12/18/2012    Procedure: COMBINED CYSTOSCOPY, TRANSURETHRAL RESECTION (TUR) TUMOR BLADDER;  CYSTOSCOPY, BILATERAL RETROGRADES AND TRANSURETHRAL RESECTION BLADDER TUMOR  (MITOMYCIN TREATMENT);  Surgeon: Von Zee MD;  Location:  OR    DAVINCI CYSTECTOMY BLADDER RADICAL, ILEAL DIVERSION, COMBINED  7/30/2013    Procedure: COMBINED DAVINCI CYSTECTOMY BLADDER RADICAL, ILEAL DIVERSION;  ROBOTIC ASSISTED CYSTOPROSTATECTOMY  WITH ILEO DIVERSION;  Surgeon: Von Zee MD;  Location:  OR    GI SURGERY      ileal diversion    HC TOOTH EXTRACTION W/FORCEP      IR CHEST PORT PLACEMENT > 5 YRS OF AGE RIGHT          Family History   Problem Relation Age of Onset    C.A.D. Father         CABG, LATE 50s    Family History Negative Father      Hypertension Father     Family History Negative Mother     Hypertension Maternal Grandfather     Hypertension Maternal Grandmother        Social History     Socioeconomic History    Marital status:      Spouse name: Not on file    Number of children: 1    Years of education: Not on file    Highest education level: Not on file   Occupational History    Not on file   Tobacco Use    Smoking status: Former     Current packs/day: 0.00     Average packs/day: 0.5 packs/day for 20.9 years (10.4 ttl pk-yrs)     Types: Cigarettes     Start date: 1992     Quit date: 2012     Years since quittin.5    Smokeless tobacco: Never   Vaping Use    Vaping status: Never Used   Substance and Sexual Activity    Alcohol use: No     Comment: stopped drinking     Drug use: No    Sexual activity: Yes     Partners: Female     Birth control/protection: Male Surgical     Comment: Prostatectomy, Radical Cystecomy/Ileal Diversion   Other Topics Concern    Parent/sibling w/ CABG, MI or angioplasty before 65F 55M? No   Social History Narrative    Not on file     Social Drivers of Health     Financial Resource Strain: Low Risk  (2024)    Financial Resource Strain     Within the past 12 months, have you or your family members you live with been unable to get utilities (heat, electricity) when it was really needed?: No   Food Insecurity: Low Risk  (2024)    Food Insecurity     Within the past 12 months, did you worry that your food would run out before you got money to buy more?: No     Within the past 12 months, did the food you bought just not last and you didn t have money to get more?: No   Transportation Needs: Low Risk  (2024)    Transportation Needs     Within the past 12 months, has lack of transportation kept you from medical appointments, getting your medicines, non-medical meetings or appointments, work, or from getting things that you need?: No   Physical Activity: Unknown (2024)    Exercise  "Vital Sign     Days of Exercise per Week: 2 days     Minutes of Exercise per Session: Not on file   Stress: No Stress Concern Present (6/25/2024)    [employer] - Occupational Stress Questionnaire     Feeling of Stress : Not at all   Social Connections: Unknown (6/25/2024)    Social Connection and Isolation Panel [NHANES]     Frequency of Communication with Friends and Family: Not on file     Frequency of Social Gatherings with Friends and Family: Once a week     Attends [employer] Services: Not on file     Active Member of Clubs or Organizations: Not on file     Attends Club or Organization Meetings: Not on file     Marital Status: Not on file   Interpersonal Safety: Low Risk  (6/25/2024)    Interpersonal Safety     Do you feel physically and emotionally safe where you currently live?: Yes     Within the past 12 months, have you been hit, slapped, kicked or otherwise physically hurt by someone?: No     Within the past 12 months, have you been humiliated or emotionally abused in other ways by your partner or ex-partner?: No   Housing Stability: Low Risk  (6/25/2024)    Housing Stability     Do you have housing? : Yes     Are you worried about losing your housing?: No       Outpatient Encounter Medications as of 7/3/2025   Medication Sig Dispense Refill    Alcohol Swabs (B-D SINGLE USE SWABS REGULAR) PADS       atenolol (TENORMIN) 100 MG tablet TAKE 1 TABLET DAILY 90 tablet 3    BD INSULIN SYRINGE U/F 30G X 1/2\" 0.5 ML miscellaneous       cyclobenzaprine (FLEXERIL) 10 MG tablet TAKE 1 TABLET THREE TIMES A DAY AS NEEDED FOR MUSCLE SPASMS 30 tablet 35    hydroCHLOROthiazide 12.5 MG tablet TAKE 1 TABLET DAILY 90 tablet 0    lisinopril (ZESTRIL) 20 MG tablet TAKE ONE AND ONE-HALF TABLETS DAILY 135 tablet 3    triamcinolone (KENALOG) 0.1 % external cream Apply topically 2 times daily (Patient taking differently: Apply topically 2 times daily as needed.) 30 g 0     No facility-administered " encounter medications on file as of 7/3/2025.             O:   NAD, WDWN, Alert & Oriented, Mood & Affect wnl, Vitals stable   General appearance normal   Vitals stable   Alert, oriented and in no acute distress     R cheek 7mm pink pearly papule       Eyes: Conjunctivae/lids:Normal     ENT: Lips, mucosa: normal    MSK:Normal    Cardiovascular: peripheral edema none    Pulm: Breathing Normal    Neuro/Psych: Orientation:Alert and Orientedx3 ; Mood/Affect:normal       A/P:  R cheek basal cell carcinoma   MOHS:   Location    The rationale for Mohs surgery was discussed with the patient and consent was obtained.  The risks and benefits as well as alternatives to therapy were discussed, in detail.  Specifically, the risks of infection, scarring, bleeding, prolonged wound healing, incomplete removal, allergy to anesthesia, nerve injury and recurrence were addressed.  Indication for Mohs was Location. Prior to the procedure, the treatment site was clearly identified and, if available, confirmed with previous photos and confirmed by the patient   All components of the Universal Protocol/PAUSE rule were completed.  The Mohs surgeon operated in two distinct and integrated capacities as the surgeon and pathologist.      The area was prepped with Betasept.  A rim of normal appearing skin was marked circumferentially around the lesion.  The area was infiltrated with local anesthesia.  The tumor was first debulked to remove all clinically apparent tumor.  An incision following the standard Mohs approach was done and the specimen was oriented,mapped and placed in 1 block(s).  Each specimen was then chromacoded and processed in the Mohs laboratory using standard Mohs technique and submitted for frozen section histology.  Frozen section analysis showed no residual tumor but CLEAR MARGINS.      The tumor was excised using standard Mohs technique in 1 stages(s).  CLEAR MARGINS OBTAINED and Final defect size was 1.2 x 1 cm.     We  discussed the options for wound management in full with the patient including risks/benefits/ possible outcomes.      REPAIR COMPLEX: Because of the tightness of the surrounding skin and Because of the size and full thickness nature of the defect, Because of the tightness of the surrounding skin, To maintain form and function, In order to avoid distortion, and Because of the proximity to the lid, a complex closure was planned. After LE anesthesia and prep, Burow's triangles were excised in the relaxed skin tension lines. The wound edges were widely undermined greater than width of the defect on both sides by dissection in the subcutaneous plane until adequate tissue mobility was obtained. Hemostasis was obtained. The wound edges were closed in a layered fashion using Vicryl and Fast Absorbing Plain Gut sutures. Postoperative length was 4.4 cm.   EBL minimal; complications none; wound care routine.  The patient was discharged in good condition and will return in one week for wound evaluation.  It was a pleasure speaking to Casey Benitez today.  Previous clinic notes and pertinent laboratory tests were reviewed prior to Casey Benitez's visit.  Signs and Symptoms of skin cancer discussed with patient.  Patient encouraged to perform monthly skin exams.  UV precautions reviewed with patient.  Risks of non-melanoma skin cancer discussed with patient   Return to clinic 6 months

## 2025-07-09 ENCOUNTER — ALLIED HEALTH/NURSE VISIT (OUTPATIENT)
Dept: DERMATOLOGY | Facility: CLINIC | Age: 60
End: 2025-07-09
Payer: COMMERCIAL

## 2025-07-09 DIAGNOSIS — Z48.01 DRESSING CHANGE OR REMOVAL, SURGICAL WOUND: Primary | ICD-10-CM

## 2025-07-09 NOTE — PROGRESS NOTES
Casey Benitez comes into clinic today at the request of Dr. Ferrera Ordering Provider for Dressing Change  .    This service provided today was under the supervising provider of the day Dr. Mckay, who was available if needed.    Please see providers note on 7/3/25 for orders  Patient returned to clinic for post surgery 1 week follow up bandage change. Patient has no complaints, denies pain.  Bandage removed from R cheek. Area cleansed with wound cleanser. Site is healing with no signs of infection, wound edges approximating well.   Reapplied new steri strips and paper tape.     Advised to watch for signs and symptons of infection; spreading redness, increasing pain, drainage, odor, fever.   Call or report promptly to clinic if any of these symptoms are present.    Wound care post op instructions given and discussed for 14 day bandage removal and continued incision/scar care.    Patient verbalized understanding.

## 2025-07-09 NOTE — PATIENT INSTRUCTIONS
WOUND CARE INSTRUCTIONS  for  ONE WEEK AFTER SURGERY    Leave flat bandage on your skin for one week after today s bandage change.  In one week when you remove the bandage, you may resume your regular skin care routine, including washing with mild soap and water, applying moisturizer, make-up and sunscreen.    If there are any open or bleeding areas at the incision/graft site you should begin to cover the area with a bandage daily as follows:    Clean and dry the area with plain tap water using a Q-tip or sterile gauze pad.  Apply Polysporin or Bacitracin ointment to the open area.  Cover the wound with a band-aid or a sterile non-stick gauze pad and micropore paper tape.         SIGNS OF INFECTION  - If you notice any of these signs of infection, call your doctor right away: expanding redness around the wound.  - Yellow or greenish-colored pus or cloudy wound drainage.    - Red streaking spreading from the wound.  - Increased swelling, tenderness, or pain around the wound.   - Fever.    Please remember that yellow and clear drainage from a wound can be normal and related to normal wound healing.  Isolated drainage from a wound without a combination of the above features does not indicate infection.       *Once the bandages are removed, the scar will be red and firm (especially in the lip/chin area). This is normal and will fade in time. It might take 6-12 months for this to happen.     *Massaging the area will help the scar soften and fade quicker. Begin to massage the area one month after the bandages have been removed. To massage apply pressure directly and firmly over the scar with the fingertips and move in a circular motion. Massage the area for a few minutes several times a day. Continue to massage the site for several months.    *Approximately 6-8 weeks after surgery it is not uncommon to see the formation of  tender pimple-like  bump along the scar. This is normal. As the scar continues to mature and the  stitches underneath the skin begin to dissolve, this might occur. Do not pick or squeeze, this will resolve on it s own. Should one break open producing a small amount of drainage, apply Polysporin or Bacitracin ointment a few times a day until the wound is completely healed.    *Numbness in the surgical area is expected. It might take 12-18 months for the feeling to return to normal. During this time sensations of itchiness, tingling and occasional sharp pains might be noted. These feelings are normal and will subside once the nerves have completely healed.         IN CASE OF EMERGENCY: Dr Ferrera [phone]     If you were seen in [place] call: [phone]    If you were seen in [place] call: [phone]

## 2025-07-21 DIAGNOSIS — I10 ESSENTIAL HYPERTENSION WITH GOAL BLOOD PRESSURE LESS THAN 140/90: ICD-10-CM

## 2025-07-21 RX ORDER — ATENOLOL 100 MG/1
100 TABLET ORAL DAILY
Qty: 60 TABLET | Refills: 0 | Status: SHIPPED | OUTPATIENT
Start: 2025-07-21

## 2025-07-22 RX ORDER — HYDROCHLOROTHIAZIDE 12.5 MG/1
TABLET ORAL
Qty: 30 TABLET | Refills: 1 | Status: SHIPPED | OUTPATIENT
Start: 2025-07-22

## 2025-07-22 RX ORDER — LISINOPRIL 20 MG/1
30 TABLET ORAL DAILY
Qty: 45 TABLET | Refills: 1 | Status: SHIPPED | OUTPATIENT
Start: 2025-07-22

## 2025-07-26 ENCOUNTER — HEALTH MAINTENANCE LETTER (OUTPATIENT)
Age: 60
End: 2025-07-26